# Patient Record
Sex: FEMALE | Race: WHITE | NOT HISPANIC OR LATINO | Employment: OTHER | URBAN - METROPOLITAN AREA
[De-identification: names, ages, dates, MRNs, and addresses within clinical notes are randomized per-mention and may not be internally consistent; named-entity substitution may affect disease eponyms.]

---

## 2017-01-24 RX ORDER — HYDROCHLOROTHIAZIDE 25 MG/1
TABLET ORAL
Qty: 30 TABLET | Refills: 0 | OUTPATIENT
Start: 2017-01-24

## 2017-08-29 ENCOUNTER — PATIENT OUTREACH (OUTPATIENT)
Dept: ADMINISTRATIVE | Facility: HOSPITAL | Age: 72
End: 2017-08-29

## 2017-09-19 ENCOUNTER — OFFICE VISIT (OUTPATIENT)
Dept: FAMILY MEDICINE | Facility: CLINIC | Age: 72
End: 2017-09-19
Payer: MEDICARE

## 2017-09-19 VITALS
BODY MASS INDEX: 23.61 KG/M2 | SYSTOLIC BLOOD PRESSURE: 201 MMHG | HEART RATE: 64 BPM | WEIGHT: 146.94 LBS | OXYGEN SATURATION: 97 % | DIASTOLIC BLOOD PRESSURE: 104 MMHG | HEIGHT: 66 IN | TEMPERATURE: 97 F

## 2017-09-19 DIAGNOSIS — Z12.39 BREAST CANCER SCREENING: ICD-10-CM

## 2017-09-19 DIAGNOSIS — E78.2 MIXED HYPERLIPIDEMIA: ICD-10-CM

## 2017-09-19 DIAGNOSIS — I16.0 ASYMPTOMATIC HYPERTENSIVE URGENCY: Primary | ICD-10-CM

## 2017-09-19 DIAGNOSIS — Z12.31 ENCOUNTER FOR SCREENING MAMMOGRAM FOR MALIGNANT NEOPLASM OF BREAST: ICD-10-CM

## 2017-09-19 DIAGNOSIS — Z78.9 STATIN INTOLERANCE: ICD-10-CM

## 2017-09-19 DIAGNOSIS — Z12.11 COLON CANCER SCREENING: ICD-10-CM

## 2017-09-19 DIAGNOSIS — M81.0 OSTEOPOROSIS, POST-MENOPAUSAL: ICD-10-CM

## 2017-09-19 DIAGNOSIS — M89.9 BONE DISORDER: ICD-10-CM

## 2017-09-19 PROCEDURE — 99213 OFFICE O/P EST LOW 20 MIN: CPT | Mod: PBBFAC,PO | Performed by: FAMILY MEDICINE

## 2017-09-19 PROCEDURE — 3080F DIAST BP >= 90 MM HG: CPT | Mod: ,,, | Performed by: FAMILY MEDICINE

## 2017-09-19 PROCEDURE — 1159F MED LIST DOCD IN RCRD: CPT | Mod: ,,, | Performed by: FAMILY MEDICINE

## 2017-09-19 PROCEDURE — 99214 OFFICE O/P EST MOD 30 MIN: CPT | Mod: S$PBB,,, | Performed by: FAMILY MEDICINE

## 2017-09-19 PROCEDURE — 99999 PR PBB SHADOW E&M-EST. PATIENT-LVL III: CPT | Mod: PBBFAC,,, | Performed by: FAMILY MEDICINE

## 2017-09-19 PROCEDURE — 1126F AMNT PAIN NOTED NONE PRSNT: CPT | Mod: ,,, | Performed by: FAMILY MEDICINE

## 2017-09-19 PROCEDURE — 3077F SYST BP >= 140 MM HG: CPT | Mod: ,,, | Performed by: FAMILY MEDICINE

## 2017-09-19 RX ORDER — GUAIFENESIN AND PHENYLEPHRINE HCL 400; 10 MG/1; MG/1
1 TABLET ORAL 2 TIMES DAILY
COMMUNITY

## 2017-09-19 RX ORDER — LOSARTAN POTASSIUM AND HYDROCHLOROTHIAZIDE 12.5; 5 MG/1; MG/1
1 TABLET ORAL DAILY
Qty: 90 TABLET | Refills: 3 | Status: SHIPPED | OUTPATIENT
Start: 2017-09-19 | End: 2018-09-12 | Stop reason: SDUPTHER

## 2017-09-19 RX ORDER — EPINEPHRINE 0.22MG
100 AEROSOL WITH ADAPTER (ML) INHALATION DAILY
COMMUNITY
End: 2018-10-18

## 2017-09-19 RX ORDER — FLAXSEED OIL 1000 MG
1 CAPSULE ORAL DAILY
COMMUNITY
End: 2019-04-22

## 2017-09-19 NOTE — PROGRESS NOTES
Chief Complaint:    Chief Complaint   Patient presents with    Annual Exam       History of Present Illness:  Condition is here after a long time it's been 2 years.  She is here for physical.  She used to take hydrochlorothiazide (hypertension also has hyperlipidemia is not on medication for osteoporosis.  She has occasional pain tip of the left she will bone only when she does something and found it does not radiate.  She does not think numbness or weakness also there is a 1 spot on the right lateral hip that feels sore sometimes but it does not limit her any and UA.  Her blood pressure is slightly high today he checked it 2 times within about 20 minutes span and it continued to stay high.  She denies any chest pain or shortness of breath.      ROS:  Review of Systems   Constitutional: Negative for activity change, chills, fatigue, fever and unexpected weight change.   HENT: Negative for congestion, ear discharge, ear pain, hearing loss, postnasal drip and rhinorrhea.    Eyes: Negative for pain and visual disturbance.   Respiratory: Negative for cough, chest tightness and shortness of breath.    Cardiovascular: Negative for chest pain and palpitations.   Gastrointestinal: Negative for abdominal pain, diarrhea and vomiting.   Endocrine: Negative for heat intolerance.   Genitourinary: Negative for dysuria, flank pain, frequency and hematuria.   Musculoskeletal: Negative for back pain, gait problem and neck pain.   Skin: Negative for color change and rash.   Neurological: Negative for dizziness, tremors, seizures, numbness and headaches.   Psychiatric/Behavioral: Negative for agitation, hallucinations, self-injury, sleep disturbance and suicidal ideas. The patient is not nervous/anxious.        Past Medical History:   Diagnosis Date    Arthritis     Hyperlipidemia     Hypertension        Social History:  Social History     Social History    Marital status: Single     Spouse name: N/A    Number of children: N/A  "   Years of education: N/A     Social History Main Topics    Smoking status: Never Smoker    Smokeless tobacco: Never Used    Alcohol use No    Drug use: No    Sexual activity: Yes     Partners: Male     Birth control/ protection: None     Other Topics Concern    Not on file     Social History Narrative    No narrative on file       Family History:   family history includes Emphysema in her mother; Heart disease in her father and mother; Lung cancer in her maternal grandfather.    Health Maintenance   Topic Date Due    Mammogram  05/12/2015    DEXA SCAN  04/21/2017    Influenza Vaccine  08/01/2017    Lipid Panel  04/10/2020    TETANUS VACCINE  04/04/2024    Colonoscopy  09/19/2024    Hepatitis C Screening  Completed    Zoster Vaccine  Completed    Pneumococcal (65+)  Completed       Physical Exam:    Vital Signs  Temp: 96.6 °F (35.9 °C)  Temp src: Tympanic  Pulse: 64  SpO2: 97 %  BP: (!) 201/104  BP Location: Left arm  Patient Position: Sitting  Pain Score: 0-No pain  Height and Weight  Height: 5' 5.5" (166.4 cm)  Weight: 66.7 kg (146 lb 15 oz)  BSA (Calculated - sq m): 1.75 sq meters  BMI (Calculated): 24.1  Weight in (lb) to have BMI = 25: 152.2]    Body mass index is 24.08 kg/m².    Physical Exam   Constitutional: She is oriented to person, place, and time. She appears well-developed.   HENT:   Mouth/Throat: Oropharynx is clear and moist.   Eyes: Conjunctivae are normal. Pupils are equal, round, and reactive to light.   Neck: Normal range of motion. Neck supple.   Cardiovascular: Normal rate, regular rhythm and normal heart sounds.    No murmur heard.  Pulmonary/Chest: Effort normal and breath sounds normal. No respiratory distress. She has no wheezes. She has no rales. She exhibits no tenderness.   Abdominal: Soft. She exhibits no distension and no mass. There is no tenderness. There is no guarding.   Musculoskeletal: She exhibits no edema or tenderness.        Legs:  Straight leg raising test " is negative normal range of motion of the back hip also has normal range of motion.   Lymphadenopathy:     She has no cervical adenopathy.   Neurological: She is alert and oriented to person, place, and time. She has normal reflexes.   Skin: Skin is warm and dry.   Psychiatric: She has a normal mood and affect. Her behavior is normal. Judgment and thought content normal.       Lab Results   Component Value Date    CHOL 252 (H) 04/10/2015    CHOL 245 (H) 04/01/2014    CHOL 210 (H) 09/27/2013    TRIG 165 (H) 04/10/2015    TRIG 83 04/01/2014    TRIG 119 09/27/2013    HDL 68 04/10/2015    HDL 72 04/01/2014    HDL 61 09/27/2013    TOTALCHOLEST 3.7 04/10/2015    TOTALCHOLEST 3.4 04/01/2014    TOTALCHOLEST 3.4 09/27/2013    NONHDLCHOL 184 04/10/2015    NONHDLCHOL 173 04/01/2014    NONHDLCHOL 149 09/27/2013       Lab Results   Component Value Date    HGBA1C 5.6 04/10/2015       Assessment:      ICD-10-CM ICD-9-CM   1. Asymptomatic hypertensive urgency I16.0 401.9   2. Encounter for screening mammogram for malignant neoplasm of breast Z12.31 V76.12   3. Bone disorder M89.9 733.90   4. Statin intolerance Z78.9 995.27   5. Mixed hyperlipidemia E78.2 272.2   6. Osteoporosis, post-menopausal M81.0 733.01   7. Breast cancer screening Z12.39 V76.10   8. Colon cancer screening Z12.11 V76.51         Plan:  1.  Asymptomatic hypertensive urgency I suspect the patient's blood pressure has been running high for a while I do not intend on lowering suddenly potato cardiovascular event patient is essentially asymptomatic would recommend lowering it gradually at least 20.7 a few days.  We'll start on losartan hydrochlorothiazide 50-12 0.5 once daily please start monitoring blood pressure purchase a home blood pressure monitor lites omeron 10 and start checking blood pressure 2 times a day.  She will be followed up closely in one week  We'll be scheduling a mammogram and DEXA scan  Colonoscopy recommend doing the trick test when we have the  kit patient refused a colonoscopy.  She appears to have some tendinitis at discharge tuberosity and some tendinitis of the right hip joint recommend physical therapy  Laboratory be checked at her next visit.  Orders Placed This Encounter   Procedures    Mammo Digital Screening Bilateral With CAD    DXA Bone Density Spine And Hip    Fecal Immunochemical Test (iFOBT)       Current Outpatient Prescriptions   Medication Sig Dispense Refill    coenzyme Q10 (CO Q-10) 100 mg capsule Take 100 mg by mouth once daily.      flaxseed oil 1,000 mg Cap Take 1 capsule by mouth once daily.      glucosamine HCl-msm-chondroitn 750-375-400 mg Tab Take 1 tablet by mouth 2 (two) times daily.      omega-3 fatty acids-vitamin E (FISH OIL) 1,000 mg Cap Take 1 capsule by mouth once daily.      turmeric root extract 500 mg Cap Take 1 capsule by mouth 2 (two) times daily.      losartan-hydrochlorothiazide 50-12.5 mg (HYZAAR) 50-12.5 mg per tablet Take 1 tablet by mouth once daily. 90 tablet 3     No current facility-administered medications for this visit.        Medications Discontinued During This Encounter   Medication Reason    hydrochlorothiazide (HYDRODIURIL) 25 MG tablet Patient no longer taking    meloxicam (MOBIC) 7.5 MG tablet Patient no longer taking    [place]'s wort 150 mg Cap Patient no longer taking       Return in about 1 week (around 9/26/2017).      Dr Porter Vasques MD    Disclaimer: This note is prepared using voice recognition system and as such is likely to have errors and is not proof read.

## 2017-09-27 ENCOUNTER — LAB VISIT (OUTPATIENT)
Dept: LAB | Facility: HOSPITAL | Age: 72
End: 2017-09-27
Payer: MEDICARE

## 2017-09-27 ENCOUNTER — OFFICE VISIT (OUTPATIENT)
Dept: FAMILY MEDICINE | Facility: CLINIC | Age: 72
End: 2017-09-27
Payer: MEDICARE

## 2017-09-27 VITALS
HEIGHT: 66 IN | OXYGEN SATURATION: 96 % | BODY MASS INDEX: 23.26 KG/M2 | DIASTOLIC BLOOD PRESSURE: 70 MMHG | SYSTOLIC BLOOD PRESSURE: 130 MMHG | WEIGHT: 144.75 LBS | TEMPERATURE: 97 F | HEART RATE: 80 BPM

## 2017-09-27 DIAGNOSIS — I10 ESSENTIAL HYPERTENSION: Primary | ICD-10-CM

## 2017-09-27 DIAGNOSIS — R53.83 FATIGUE, UNSPECIFIED TYPE: ICD-10-CM

## 2017-09-27 DIAGNOSIS — R11.0 NAUSEA: ICD-10-CM

## 2017-09-27 DIAGNOSIS — I10 ESSENTIAL HYPERTENSION: ICD-10-CM

## 2017-09-27 LAB
ALBUMIN SERPL BCP-MCNC: 3.6 G/DL
ALP SERPL-CCNC: 127 U/L
ALT SERPL W/O P-5'-P-CCNC: 14 U/L
ANION GAP SERPL CALC-SCNC: 12 MMOL/L
AST SERPL-CCNC: 18 U/L
BASOPHILS # BLD AUTO: 0.03 K/UL
BASOPHILS NFR BLD: 0.5 %
BILIRUB SERPL-MCNC: 1.1 MG/DL
BUN SERPL-MCNC: 20 MG/DL
CALCIUM SERPL-MCNC: 10.5 MG/DL
CHLORIDE SERPL-SCNC: 102 MMOL/L
CHOLEST SERPL-MCNC: 231 MG/DL
CHOLEST/HDLC SERPL: 3.5 {RATIO}
CO2 SERPL-SCNC: 25 MMOL/L
CREAT SERPL-MCNC: 0.7 MG/DL
DIFFERENTIAL METHOD: ABNORMAL
EOSINOPHIL # BLD AUTO: 0.3 K/UL
EOSINOPHIL NFR BLD: 5 %
ERYTHROCYTE [DISTWIDTH] IN BLOOD BY AUTOMATED COUNT: 19.1 %
EST. GFR  (AFRICAN AMERICAN): >60 ML/MIN/1.73 M^2
EST. GFR  (NON AFRICAN AMERICAN): >60 ML/MIN/1.73 M^2
GLUCOSE SERPL-MCNC: 103 MG/DL
HCT VFR BLD AUTO: 36.2 %
HDLC SERPL-MCNC: 66 MG/DL
HDLC SERPL: 28.6 %
HGB BLD-MCNC: 11.5 G/DL
LDLC SERPL CALC-MCNC: 134.6 MG/DL
LYMPHOCYTES # BLD AUTO: 1.6 K/UL
LYMPHOCYTES NFR BLD: 23.8 %
MCH RBC QN AUTO: 24 PG
MCHC RBC AUTO-ENTMCNC: 31.8 G/DL
MCV RBC AUTO: 76 FL
MONOCYTES # BLD AUTO: 0.7 K/UL
MONOCYTES NFR BLD: 10.7 %
NEUTROPHILS # BLD AUTO: 3.9 K/UL
NEUTROPHILS NFR BLD: 60 %
NONHDLC SERPL-MCNC: 165 MG/DL
PLATELET # BLD AUTO: 305 K/UL
PMV BLD AUTO: 10.6 FL
POTASSIUM SERPL-SCNC: 3.3 MMOL/L
PROT SERPL-MCNC: 7.5 G/DL
RBC # BLD AUTO: 4.79 M/UL
SODIUM SERPL-SCNC: 139 MMOL/L
TRIGL SERPL-MCNC: 152 MG/DL
TSH SERPL DL<=0.005 MIU/L-ACNC: 0.59 UIU/ML
VIT B12 SERPL-MCNC: 863 PG/ML
WBC # BLD AUTO: 6.56 K/UL

## 2017-09-27 PROCEDURE — 1126F AMNT PAIN NOTED NONE PRSNT: CPT | Mod: ,,, | Performed by: FAMILY MEDICINE

## 2017-09-27 PROCEDURE — 83036 HEMOGLOBIN GLYCOSYLATED A1C: CPT

## 2017-09-27 PROCEDURE — 82607 VITAMIN B-12: CPT

## 2017-09-27 PROCEDURE — 84443 ASSAY THYROID STIM HORMONE: CPT

## 2017-09-27 PROCEDURE — 3075F SYST BP GE 130 - 139MM HG: CPT | Mod: ,,, | Performed by: FAMILY MEDICINE

## 2017-09-27 PROCEDURE — 99214 OFFICE O/P EST MOD 30 MIN: CPT | Mod: S$PBB,,, | Performed by: FAMILY MEDICINE

## 2017-09-27 PROCEDURE — 1159F MED LIST DOCD IN RCRD: CPT | Mod: ,,, | Performed by: FAMILY MEDICINE

## 2017-09-27 PROCEDURE — 99213 OFFICE O/P EST LOW 20 MIN: CPT | Mod: PBBFAC,PO | Performed by: FAMILY MEDICINE

## 2017-09-27 PROCEDURE — 3078F DIAST BP <80 MM HG: CPT | Mod: ,,, | Performed by: FAMILY MEDICINE

## 2017-09-27 PROCEDURE — 80053 COMPREHEN METABOLIC PANEL: CPT

## 2017-09-27 PROCEDURE — 36415 COLL VENOUS BLD VENIPUNCTURE: CPT | Mod: PO

## 2017-09-27 PROCEDURE — 80061 LIPID PANEL: CPT

## 2017-09-27 PROCEDURE — 99999 PR PBB SHADOW E&M-EST. PATIENT-LVL III: CPT | Mod: PBBFAC,,, | Performed by: FAMILY MEDICINE

## 2017-09-27 PROCEDURE — 85025 COMPLETE CBC W/AUTO DIFF WBC: CPT

## 2017-09-27 NOTE — PROGRESS NOTES
Chief Complaint:    Chief Complaint   Patient presents with    Follow-up       History of Present Illness:    She presents today for follow-up she says her blood pressures running good at home she forgot to bring her machine.  She has had some nausea anytime she takes the medicine and been feeling some muscle cramps.  No other side effects.  She has been feeling fatigued also.  Denies any depression.  ROS:  Review of Systems   Constitutional: Negative for appetite change, chills and fever.   HENT: Negative for congestion, ear discharge, ear pain, facial swelling, mouth sores, postnasal drip, rhinorrhea, sinus pressure, sneezing, sore throat, trouble swallowing and voice change.    Eyes: Negative for discharge, redness and itching.   Respiratory: Negative for cough, chest tightness, shortness of breath and wheezing.    Cardiovascular: Negative for chest pain.       Past Medical History:   Diagnosis Date    Arthritis     Hyperlipidemia     Hypertension        Social History:  Social History     Social History    Marital status: Single     Spouse name: N/A    Number of children: N/A    Years of education: N/A     Social History Main Topics    Smoking status: Never Smoker    Smokeless tobacco: Never Used    Alcohol use No    Drug use: No    Sexual activity: Yes     Partners: Male     Birth control/ protection: None     Other Topics Concern    None     Social History Narrative    None       Family History:   family history includes Emphysema in her mother; Heart disease in her father and mother; Lung cancer in her maternal grandfather.    Health Maintenance   Topic Date Due    Fecal Occult Blood Test (FOBT)/FitKit  07/19/1997    Mammogram  05/12/2015    DEXA SCAN  04/21/2017    Influenza Vaccine  08/01/2017    Lipid Panel  04/10/2020    TETANUS VACCINE  04/04/2024    Colonoscopy  09/19/2024    Hepatitis C Screening  Completed    Zoster Vaccine  Completed    Pneumococcal (65+)  Completed  "      Physical Exam:    Vital Signs  Temp: 97.3 °F (36.3 °C)  Temp src: Tympanic  Pulse: 80  SpO2: 96 %  BP: 130/70  BP Location: Left arm  Patient Position: Sitting  Pain Score: 0-No pain (home readings)  Height and Weight  Height: 5' 5.5" (166.4 cm)  Weight: 65.6 kg (144 lb 11.7 oz)  BSA (Calculated - sq m): 1.74 sq meters  BMI (Calculated): 23.8  Weight in (lb) to have BMI = 25: 152.2]    Body mass index is 23.72 kg/m².    Physical Exam   Constitutional: She is oriented to person, place, and time.   HENT:   Head: Normocephalic.   Eyes: Conjunctivae are normal. Pupils are equal, round, and reactive to light.   Cardiovascular:   Murmur heard.   Systolic murmur is present with a grade of 2/6   Pulmonary/Chest: Effort normal and breath sounds normal.   Neurological: She is alert and oriented to person, place, and time.       Lab Results   Component Value Date    CHOL 252 (H) 04/10/2015    CHOL 245 (H) 04/01/2014    CHOL 210 (H) 09/27/2013    TRIG 165 (H) 04/10/2015    TRIG 83 04/01/2014    TRIG 119 09/27/2013    HDL 68 04/10/2015    HDL 72 04/01/2014    HDL 61 09/27/2013    TOTALCHOLEST 3.7 04/10/2015    TOTALCHOLEST 3.4 04/01/2014    TOTALCHOLEST 3.4 09/27/2013    NONHDLCHOL 184 04/10/2015    NONHDLCHOL 173 04/01/2014    NONHDLCHOL 149 09/27/2013       Lab Results   Component Value Date    HGBA1C 5.6 04/10/2015       Assessment:      ICD-10-CM ICD-9-CM   1. Essential hypertension I10 401.9   2. Nausea R11.0 787.02   3. Fatigue, unspecified type R53.83 780.79         Plan:  Advised patient to take the medicine at night to see if the nausea improves if not we will change the medicine but it's a good medicine and I do not want to be in a rash should the side effects past.  Check labs as below she follow-up in a few weeks with the blood pressure machine.  Orders Placed This Encounter   Procedures    CBC auto differential    Comprehensive metabolic panel    Hemoglobin A1c    Lipid panel    TSH    Vitamin B12 "       Current Outpatient Prescriptions   Medication Sig Dispense Refill    coenzyme Q10 (CO Q-10) 100 mg capsule Take 100 mg by mouth once daily.      flaxseed oil 1,000 mg Cap Take 1 capsule by mouth once daily.      glucosamine HCl-msm-chondroitn 750-375-400 mg Tab Take 1 tablet by mouth 2 (two) times daily.      losartan-hydrochlorothiazide 50-12.5 mg (HYZAAR) 50-12.5 mg per tablet Take 1 tablet by mouth once daily. 90 tablet 3    omega-3 fatty acids-vitamin E (FISH OIL) 1,000 mg Cap Take 1 capsule by mouth once daily.      turmeric root extract 500 mg Cap Take 1 capsule by mouth 2 (two) times daily.       No current facility-administered medications for this visit.        There are no discontinued medications.    Return in about 3 weeks (around 10/18/2017).      Dr Porter Vasques MD    Disclaimer: This note is prepared using voice recognition system and as such is likely to have errors and is not proof read.

## 2017-09-28 LAB
ESTIMATED AVG GLUCOSE: 108 MG/DL
HBA1C MFR BLD HPLC: 5.4 %

## 2017-09-28 RX ORDER — POTASSIUM CHLORIDE 20 MEQ/1
20 TABLET, EXTENDED RELEASE ORAL DAILY
Qty: 30 TABLET | Refills: 4 | Status: SHIPPED | OUTPATIENT
Start: 2017-09-28 | End: 2018-03-29 | Stop reason: SDUPTHER

## 2017-10-05 ENCOUNTER — TELEPHONE (OUTPATIENT)
Dept: RADIOLOGY | Facility: HOSPITAL | Age: 72
End: 2017-10-05

## 2017-10-06 ENCOUNTER — HOSPITAL ENCOUNTER (OUTPATIENT)
Dept: RADIOLOGY | Facility: HOSPITAL | Age: 72
Discharge: HOME | End: 2017-10-06
Attending: FAMILY MEDICINE
Payer: MEDICARE

## 2017-10-06 DIAGNOSIS — Z12.31 ENCOUNTER FOR SCREENING MAMMOGRAM FOR MALIGNANT NEOPLASM OF BREAST: ICD-10-CM

## 2017-10-06 PROCEDURE — 77067 SCR MAMMO BI INCL CAD: CPT | Mod: 26,,, | Performed by: RADIOLOGY

## 2017-10-06 PROCEDURE — 77067 SCR MAMMO BI INCL CAD: CPT | Mod: TC

## 2017-10-18 ENCOUNTER — OFFICE VISIT (OUTPATIENT)
Dept: FAMILY MEDICINE | Facility: CLINIC | Age: 72
End: 2017-10-18
Payer: MEDICARE

## 2017-10-18 ENCOUNTER — LAB VISIT (OUTPATIENT)
Dept: LAB | Facility: HOSPITAL | Age: 72
End: 2017-10-18
Attending: FAMILY MEDICINE
Payer: MEDICARE

## 2017-10-18 VITALS
HEART RATE: 66 BPM | TEMPERATURE: 96 F | DIASTOLIC BLOOD PRESSURE: 70 MMHG | HEIGHT: 66 IN | OXYGEN SATURATION: 98 % | WEIGHT: 149.25 LBS | BODY MASS INDEX: 23.99 KG/M2 | SYSTOLIC BLOOD PRESSURE: 115 MMHG

## 2017-10-18 DIAGNOSIS — E87.6 HYPOKALEMIA: ICD-10-CM

## 2017-10-18 DIAGNOSIS — D50.9 MICROCYTIC HYPOCHROMIC ANEMIA: ICD-10-CM

## 2017-10-18 DIAGNOSIS — I10 HYPERTENSION, UNSPECIFIED TYPE: Primary | ICD-10-CM

## 2017-10-18 LAB
ANION GAP SERPL CALC-SCNC: 9 MMOL/L
BUN SERPL-MCNC: 14 MG/DL
CALCIUM SERPL-MCNC: 10.3 MG/DL
CHLORIDE SERPL-SCNC: 104 MMOL/L
CO2 SERPL-SCNC: 27 MMOL/L
CREAT SERPL-MCNC: 0.6 MG/DL
EST. GFR  (AFRICAN AMERICAN): >60 ML/MIN/1.73 M^2
EST. GFR  (NON AFRICAN AMERICAN): >60 ML/MIN/1.73 M^2
FERRITIN SERPL-MCNC: 16 NG/ML
GLUCOSE SERPL-MCNC: 79 MG/DL
IRON SERPL-MCNC: 51 UG/DL
POTASSIUM SERPL-SCNC: 4 MMOL/L
RETICS/RBC NFR AUTO: 1.6 %
SATURATED IRON: 12 %
SODIUM SERPL-SCNC: 140 MMOL/L
TOTAL IRON BINDING CAPACITY: 443 UG/DL
TRANSFERRIN SERPL-MCNC: 299 MG/DL
TRANSFERRIN SERPL-MCNC: 299 MG/DL

## 2017-10-18 PROCEDURE — 83615 LACTATE (LD) (LDH) ENZYME: CPT

## 2017-10-18 PROCEDURE — 85045 AUTOMATED RETICULOCYTE COUNT: CPT

## 2017-10-18 PROCEDURE — 36415 COLL VENOUS BLD VENIPUNCTURE: CPT | Mod: PO

## 2017-10-18 PROCEDURE — 99999 PR PBB SHADOW E&M-EST. PATIENT-LVL III: CPT | Mod: PBBFAC,,, | Performed by: FAMILY MEDICINE

## 2017-10-18 PROCEDURE — 99213 OFFICE O/P EST LOW 20 MIN: CPT | Mod: PBBFAC,PO | Performed by: FAMILY MEDICINE

## 2017-10-18 PROCEDURE — 80048 BASIC METABOLIC PNL TOTAL CA: CPT

## 2017-10-18 PROCEDURE — 82728 ASSAY OF FERRITIN: CPT

## 2017-10-18 PROCEDURE — 99214 OFFICE O/P EST MOD 30 MIN: CPT | Mod: S$PBB,,, | Performed by: FAMILY MEDICINE

## 2017-10-18 PROCEDURE — 83540 ASSAY OF IRON: CPT

## 2017-10-18 NOTE — PROGRESS NOTES
Chief Complaint:    Chief Complaint   Patient presents with    Follow-up       History of Present Illness:    Patient is here for follow-up,  Her blood pressure at home is running good weight and be checked her machine with our machine and so running pretty accurate.  Patient's nausea and fatigue of the results.  Her last labs revealed that she has microcytic hypochromic anemia.  She was also slightly hypokalemic possibly secondary to the medication she has been taking the potassium pills.    ROS:  Review of Systems   Constitutional: Negative for activity change, chills, fatigue, fever and unexpected weight change.   HENT: Negative for congestion, ear discharge, ear pain, hearing loss, postnasal drip and rhinorrhea.    Eyes: Negative for pain and visual disturbance.   Respiratory: Negative for cough, chest tightness and shortness of breath.    Cardiovascular: Negative for chest pain and palpitations.   Gastrointestinal: Negative for abdominal pain, diarrhea and vomiting.   Endocrine: Negative for heat intolerance.   Genitourinary: Negative for dysuria, flank pain, frequency and hematuria.   Musculoskeletal: Negative for back pain, gait problem and neck pain.   Skin: Negative for color change and rash.   Neurological: Negative for dizziness, tremors, seizures, numbness and headaches.   Psychiatric/Behavioral: Negative for agitation, hallucinations, self-injury, sleep disturbance and suicidal ideas. The patient is not nervous/anxious.        Past Medical History:   Diagnosis Date    Arthritis     Hyperlipidemia     Hypertension        Social History:  Social History     Social History    Marital status: Single     Spouse name: N/A    Number of children: N/A    Years of education: N/A     Social History Main Topics    Smoking status: Never Smoker    Smokeless tobacco: Never Used    Alcohol use No    Drug use: No    Sexual activity: No     Other Topics Concern    None     Social History Narrative    None  "      Family History:   family history includes Emphysema in her mother; Heart disease in her father and mother; Lung cancer in her maternal grandfather.    Health Maintenance   Topic Date Due    Mammogram  10/06/2018    DEXA SCAN  10/06/2019    Lipid Panel  09/27/2022    TETANUS VACCINE  04/04/2024    Colonoscopy  09/19/2024    Hepatitis C Screening  Completed    Zoster Vaccine  Completed    Pneumococcal (65+)  Completed    Influenza Vaccine  Addressed       Physical Exam:    Vital Signs  Temp: 96.4 °F (35.8 °C)  Temp src: Tympanic  Pulse: 66  SpO2: 98 %  BP: 115/70  BP Location: Left arm  Patient Position: Sitting  Pain Score: 0-No pain  Height and Weight  Height: 5' 5.5" (166.4 cm)  Weight: 67.7 kg (149 lb 4 oz)  BSA (Calculated - sq m): 1.77 sq meters  BMI (Calculated): 24.5  Weight in (lb) to have BMI = 25: 152.2]    Body mass index is 24.46 kg/m².    Physical Exam   Constitutional: She is oriented to person, place, and time. She appears well-developed.   HENT:   Mouth/Throat: Oropharynx is clear and moist.   Eyes: Conjunctivae are normal. Pupils are equal, round, and reactive to light.   Neck: Normal range of motion. Neck supple.   Cardiovascular: Normal rate, regular rhythm and normal heart sounds.    No murmur heard.  Pulmonary/Chest: Effort normal and breath sounds normal. No respiratory distress. She has no wheezes. She has no rales. She exhibits no tenderness.   Abdominal: Soft. She exhibits no distension and no mass. There is no tenderness. There is no guarding.   Musculoskeletal: She exhibits no edema or tenderness.   Lymphadenopathy:     She has no cervical adenopathy.   Neurological: She is alert and oriented to person, place, and time. She has normal reflexes.   Skin: Skin is warm and dry.   Psychiatric: She has a normal mood and affect. Her behavior is normal. Judgment and thought content normal.       Lab Results   Component Value Date    CHOL 231 (H) 09/27/2017    CHOL 252 (H) 04/10/2015 "    CHOL 245 (H) 04/01/2014    TRIG 152 (H) 09/27/2017    TRIG 165 (H) 04/10/2015    TRIG 83 04/01/2014    HDL 66 09/27/2017    HDL 68 04/10/2015    HDL 72 04/01/2014    TOTALCHOLEST 3.5 09/27/2017    TOTALCHOLEST 3.7 04/10/2015    TOTALCHOLEST 3.4 04/01/2014    NONHDLCHOL 165 09/27/2017    NONHDLCHOL 184 04/10/2015    NONHDLCHOL 173 04/01/2014       Lab Results   Component Value Date    HGBA1C 5.4 09/27/2017       Assessment:      ICD-10-CM ICD-9-CM   1. Hypertension, unspecified type I10 401.9   2. Microcytic hypochromic anemia D50.9 280.9   3. Hypokalemia E87.6 276.8         Plan:  1.  Hypertension good control continue home medications continue to monitor  2.  Anemia, we will initiated workup explained to her that if she does have this she will need referral to hematology and gastroenterology.  3.  Hypokalemia and check a BMP continue potassium pills.  Follow-up 6 months  Orders Placed This Encounter   Procedures    Iron    Occult blood x 1, stool    Occult blood x 1, stool    Occult blood x 1, stool    Iron and TIBC    Lactate dehydrogenase    Reticulocytes    Ferritin    Transferrin    Basic metabolic panel       Current Outpatient Prescriptions   Medication Sig Dispense Refill    coenzyme Q10 (CO Q-10) 100 mg capsule Take 100 mg by mouth once daily.      flaxseed oil 1,000 mg Cap Take 1 capsule by mouth once daily.      glucosamine HCl-msm-chondroitn 750-375-400 mg Tab Take 1 tablet by mouth 2 (two) times daily.      losartan-hydrochlorothiazide 50-12.5 mg (HYZAAR) 50-12.5 mg per tablet Take 1 tablet by mouth once daily. 90 tablet 3    omega-3 fatty acids-vitamin E (FISH OIL) 1,000 mg Cap Take 1 capsule by mouth once daily.      potassium chloride SA (K-DUR,KLOR-CON) 20 MEQ tablet Take 1 tablet (20 mEq total) by mouth once daily. 30 tablet 4    turmeric root extract 500 mg Cap Take 1 capsule by mouth 2 (two) times daily.       No current facility-administered medications for this visit.         There are no discontinued medications.    Return in about 6 months (around 4/18/2018).      Porter Vasques MD          Disclaimer: This note is prepared using voice recognition system and as such is likely to have errors and is not proof read.

## 2017-10-19 ENCOUNTER — LAB VISIT (OUTPATIENT)
Dept: LAB | Facility: HOSPITAL | Age: 72
End: 2017-10-19
Attending: FAMILY MEDICINE
Payer: MEDICARE

## 2017-10-19 DIAGNOSIS — Z12.11 COLON CANCER SCREENING: ICD-10-CM

## 2017-10-19 LAB
HEMOCCULT STL QL IA: NEGATIVE
LDH SERPL L TO P-CCNC: 170 U/L

## 2017-10-19 PROCEDURE — 82274 ASSAY TEST FOR BLOOD FECAL: CPT

## 2017-10-20 ENCOUNTER — TELEPHONE (OUTPATIENT)
Dept: INTERNAL MEDICINE | Facility: CLINIC | Age: 72
End: 2017-10-20

## 2017-10-20 ENCOUNTER — TELEPHONE (OUTPATIENT)
Dept: RADIOLOGY | Facility: HOSPITAL | Age: 72
End: 2017-10-20

## 2017-10-20 NOTE — TELEPHONE ENCOUNTER
----- Message from Porter Vasques MD sent at 10/19/2017  6:16 PM CDT -----  You have iron deficiency anemia, he will need a full GI workup.  Please see hematology and they can address this.

## 2017-10-23 ENCOUNTER — HOSPITAL ENCOUNTER (OUTPATIENT)
Dept: RADIOLOGY | Facility: HOSPITAL | Age: 72
Discharge: HOME | End: 2017-10-23
Attending: FAMILY MEDICINE
Payer: MEDICARE

## 2017-10-23 DIAGNOSIS — R92.8 ABNORMAL MAMMOGRAM: ICD-10-CM

## 2017-10-23 PROCEDURE — 77061 BREAST TOMOSYNTHESIS UNI: CPT | Mod: TC

## 2017-10-23 PROCEDURE — 77061 BREAST TOMOSYNTHESIS UNI: CPT | Mod: 26,,, | Performed by: RADIOLOGY

## 2017-10-23 PROCEDURE — 77065 DX MAMMO INCL CAD UNI: CPT | Mod: 26,,, | Performed by: RADIOLOGY

## 2017-10-24 DIAGNOSIS — R92.8 ABNORMAL MAMMOGRAM: Primary | ICD-10-CM

## 2017-10-25 RX ORDER — FERROUS SULFATE 325(65) MG
325 TABLET ORAL
Qty: 60 TABLET | Refills: 0 | Status: SHIPPED | OUTPATIENT
Start: 2017-10-25

## 2017-11-02 ENCOUNTER — TELEPHONE (OUTPATIENT)
Dept: FAMILY MEDICINE | Facility: CLINIC | Age: 72
End: 2017-11-02

## 2017-11-02 NOTE — TELEPHONE ENCOUNTER
----- Message from Porter Vasques MD sent at 10/30/2017  6:00 PM CDT -----  Patient needs to have the spot compression views and ultrasound please ask her to reschedule

## 2017-11-02 NOTE — TELEPHONE ENCOUNTER
Tried to reach pt about her results and needing to reschedule her mammo and ultrasound no answer and no way to leave message

## 2017-11-09 ENCOUNTER — TELEPHONE (OUTPATIENT)
Dept: FAMILY MEDICINE | Facility: CLINIC | Age: 72
End: 2017-11-09

## 2017-11-09 DIAGNOSIS — E61.1 LOW IRON: Primary | ICD-10-CM

## 2017-11-09 NOTE — TELEPHONE ENCOUNTER
----- Message from Susie Garcia sent at 11/9/2017  4:28 PM CST -----  Contact: PT   Pt request call back about due to her receiving a letter for a test that she has already had.   [phone] (home)

## 2017-12-12 ENCOUNTER — TELEPHONE (OUTPATIENT)
Dept: ORTHOPEDICS | Facility: CLINIC | Age: 72
End: 2017-12-12

## 2017-12-12 DIAGNOSIS — M25.572 LEFT ANKLE PAIN, UNSPECIFIED CHRONICITY: Primary | ICD-10-CM

## 2017-12-12 NOTE — TELEPHONE ENCOUNTER
Pt contacted to offer earlier appointment with [facility]. Pt accepted 12/13/2017 at 0930. Pt was instructed to arrive 30min prior to appointment to have xray conducted. Pt verbalized understanding.

## 2017-12-13 ENCOUNTER — OFFICE VISIT (OUTPATIENT)
Dept: ORTHOPEDICS | Facility: CLINIC | Age: 72
End: 2017-12-13
Payer: MEDICARE

## 2017-12-13 ENCOUNTER — HOSPITAL ENCOUNTER (OUTPATIENT)
Dept: RADIOLOGY | Facility: HOSPITAL | Age: 72
Discharge: HOME | End: 2017-12-13
Attending: ORTHOPAEDIC SURGERY
Payer: MEDICARE

## 2017-12-13 VITALS
HEART RATE: 69 BPM | RESPIRATION RATE: 19 BRPM | BODY MASS INDEX: 24.22 KG/M2 | HEIGHT: 66 IN | SYSTOLIC BLOOD PRESSURE: 140 MMHG | DIASTOLIC BLOOD PRESSURE: 70 MMHG | WEIGHT: 150.69 LBS

## 2017-12-13 DIAGNOSIS — M19.072 ARTHRITIS OF ANKLE, LEFT: Primary | ICD-10-CM

## 2017-12-13 DIAGNOSIS — M25.572 LEFT ANKLE PAIN, UNSPECIFIED CHRONICITY: ICD-10-CM

## 2017-12-13 DIAGNOSIS — M21.072 ACQUIRED VALGUS DEFORMITY OF LEFT ANKLE: ICD-10-CM

## 2017-12-13 PROCEDURE — 73610 X-RAY EXAM OF ANKLE: CPT | Mod: 26,LT,, | Performed by: RADIOLOGY

## 2017-12-13 PROCEDURE — 99999 PR PBB SHADOW E&M-EST. PATIENT-LVL III: CPT | Mod: PBBFAC,,, | Performed by: ORTHOPAEDIC SURGERY

## 2017-12-13 PROCEDURE — 99213 OFFICE O/P EST LOW 20 MIN: CPT | Mod: PBBFAC,25 | Performed by: ORTHOPAEDIC SURGERY

## 2017-12-13 PROCEDURE — 99203 OFFICE O/P NEW LOW 30 MIN: CPT | Mod: S$PBB,,, | Performed by: ORTHOPAEDIC SURGERY

## 2017-12-13 PROCEDURE — 73610 X-RAY EXAM OF ANKLE: CPT | Mod: TC,LT

## 2017-12-13 RX ORDER — MELOXICAM 15 MG/1
15 TABLET ORAL DAILY
Qty: 30 TABLET | Refills: 1 | Status: SHIPPED | OUTPATIENT
Start: 2017-12-13 | End: 2018-04-18

## 2017-12-13 NOTE — PATIENT INSTRUCTIONS
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 mobic OR alleve on regular basis  2. [place] brace  3. Possible referral to Foot and ankle surgeon for consideration of ankle fusion vs. replacement

## 2017-12-13 NOTE — PROGRESS NOTES
"Subjective:     Patient ID: Joyce Marino is a 72 y.o. female.    Chief Complaint: Weakness and Pain of the Left Ankle    Ankle Pain    The pain is present in the left ankle. The pain radiates to the left knee. This is a chronic problem. The current episode started more than 1 year ago. The injury was the result of a collision/contact action while at school. The problem occurs rarely. The problem has been gradually worsening. The quality of the pain is described as sharp and dull. The pain is at a severity of 0/10. Associated symptoms include a fever. Pertinent negatives include no itching. The symptoms are aggravated by bearing weight (spontaneous). She has tried NSAIDs for the symptoms. The treatment provided moderate relief. Physical therapy was not tried.    Ms Marino is here for evaluation of left ankle pain and "weakness." She recalls an injury many years ago, in high school, playing basketball, got "rolled up" on. Was diagnosed with an ankle sprain at the time. She used to have recurrent ankle instability but this has not been a problem over the recent years. More difficulty with push off and feeling "weak." She has pain when working outside, especially with her horses. She notices that her gait is off, and she limps regularly.    Past Medical History:   Diagnosis Date    Arthritis     Hyperlipidemia     Hypertension      Past Surgical History:   Procedure Laterality Date    HYSTERECTOMY  1992    for fibroids    OOPHORECTOMY      BSO at time of hysterectomy    michael in right leg       Family History   Problem Relation Age of Onset    Heart disease Mother     Emphysema Mother     Heart disease Father     Lung cancer Maternal Grandfather      Social History     Social History    Marital status: Single     Spouse name: N/A    Number of children: N/A    Years of education: N/A     Occupational History    Not on file.     Social History Main Topics    Smoking status: Never Smoker    Smokeless " tobacco: Never Used    Alcohol use No    Drug use: No    Sexual activity: No     Other Topics Concern    Not on file     Social History Narrative    No narrative on file     Medication List with Changes/Refills   New Medications    MELOXICAM (MOBIC) 15 MG TABLET    Take 1 tablet (15 mg total) by mouth once daily.   Current Medications    COENZYME Q10 (CO Q-10) 100 MG CAPSULE    Take 100 mg by mouth once daily.    FERROUS SULFATE 325 MG (65 MG IRON) TAB TABLET    Take 1 tablet (325 mg total) by mouth daily with breakfast.    FLAXSEED OIL 1,000 MG CAP    Take 1 capsule by mouth once daily.    GLUCOSAMINE HCL-MSM-CHONDROITN 750-375-400 MG TAB    Take 1 tablet by mouth 2 (two) times daily.    LOSARTAN-HYDROCHLOROTHIAZIDE 50-12.5 MG (HYZAAR) 50-12.5 MG PER TABLET    Take 1 tablet by mouth once daily.    OMEGA-3 FATTY ACIDS-VITAMIN E (FISH OIL) 1,000 MG CAP    Take 1 capsule by mouth once daily.    POTASSIUM CHLORIDE SA (K-DUR,KLOR-CON) 20 MEQ TABLET    Take 1 tablet (20 mEq total) by mouth once daily.    TURMERIC ROOT EXTRACT 500 MG CAP    Take 1 capsule by mouth 2 (two) times daily.     Review of patient's allergies indicates:  No Known Allergies  Review of Systems   Constitution: Positive for fever.   HENT: Positive for sore throat.    Eyes: Negative for blurred vision.   Cardiovascular: Negative for dyspnea on exertion.   Respiratory: Negative for shortness of breath.    Hematologic/Lymphatic: Does not bruise/bleed easily.   Skin: Negative for itching.   Gastrointestinal: Negative for vomiting.   Genitourinary: Negative for dysuria.   Neurological: Negative for dizziness.   Psychiatric/Behavioral: The patient does not have insomnia.        Objective:   Body mass index is 24.22 kg/m².  Vitals:    12/13/17 0924   BP: (!) 140/70   Pulse: 69   Resp: 19       General: Joyce is well-developed, well-nourished, appears stated age, in no acute distress, alert and oriented to time, place and person.           General  Musculoskeletal Exam   Gait: antalgic     Right Ankle/Foot Exam   Right ankle exam is normal.    Left Ankle/Foot Exam     Inspection  Deformity: present    Pain   The patient exhibits pain of the anterior talofibular ligament and lateral malleolus.    Range of Motion   Ankle Joint  Dorsiflexion: 0   Plantar flexion: 15     Alignment   Knee Alignment: varus  Hindfoot Alignment: valgus    Tests   Anterior drawer: negative  Varus tilt: 1+  Single Heel Rise: unable to perform    Other   Sensation: normal    Comments:  She has valgus deformity to the ankle. She has pain over the anteriomedial and subfibular aspect with direct palpation. Varus and valgus stress is otherwise negative, but she does have noted valgus deformity as stated above.      Vascular Exam       Left Pulses  Dorsalis Pedis:      2+              IMAGING AP Mortise and Lateral weight bearing of the left ankle obtained today and interpreted by me show severe tibiotalar joint osteoarthritis, decreased joint space with more severe narrowing of the lateral superior clear space resulting in valgus talar tilt, approximately 15-20 degrees of tilt. Osteophytes are present. There is subtalar joint decreased joint space and osteophyte formation especially posteriorly.     Assessment:     Encounter Diagnoses   Name Primary?    Arthritis of ankle, left Yes    Acquired valgus deformity of left ankle         Plan:     I had a long discussion with Ms. Marino regarding her left ankle arthritis and valgus deformity. I do think that for this chronic problem that it is reasonable to try some non operative measures to see if she gets some relief, but with her severe arthritis, co involvement of the subtalar joint, and valgus defomity and tilt, I do think that she will ultimately need surgical intervention for this problem. Interestingly though, she does not seem to be as symptomatic as her radiographs suggest. She is relatively high functioning despite this very bad  "problem, and we did discuss this point today.    From a non operative treatment standpoint, we will do a regularly scheduled NSAID, and I suggested an [place] Brace / single rocker shoe. She will start the medication and see how she does. She will consider the brace and let us know if she would like to proceed with that. I do not think that an injection will help much at this time given the severe "bone on bone" arthrosis.    Ultimately though, I discussed with her that surgery may likely be the best way to address her pain and deformity if she fails non operative treatment. I discussed with her that we have excellent foota and ankle orthopaedic surgeons in the [facility] system and I would be very happy to set her up with a referral if she desires. This would likely consist of an evaluation for ankle arthodesis vs arthroplasty.    She will follow up in 2 months and I will see how she is doing.        "

## 2018-03-29 RX ORDER — POTASSIUM CHLORIDE 1500 MG/1
TABLET, EXTENDED RELEASE ORAL
Qty: 30 TABLET | Refills: 4 | Status: SHIPPED | OUTPATIENT
Start: 2018-03-29 | End: 2020-12-09 | Stop reason: SDUPTHER

## 2018-04-10 ENCOUNTER — PATIENT OUTREACH (OUTPATIENT)
Dept: ADMINISTRATIVE | Facility: HOSPITAL | Age: 73
End: 2018-04-10

## 2018-04-18 ENCOUNTER — OFFICE VISIT (OUTPATIENT)
Dept: FAMILY MEDICINE | Facility: CLINIC | Age: 73
End: 2018-04-18
Payer: MEDICARE

## 2018-04-18 ENCOUNTER — LAB VISIT (OUTPATIENT)
Dept: LAB | Facility: HOSPITAL | Age: 73
End: 2018-04-18
Attending: FAMILY MEDICINE
Payer: MEDICARE

## 2018-04-18 VITALS
WEIGHT: 151.44 LBS | OXYGEN SATURATION: 99 % | BODY MASS INDEX: 24.34 KG/M2 | DIASTOLIC BLOOD PRESSURE: 80 MMHG | SYSTOLIC BLOOD PRESSURE: 120 MMHG | TEMPERATURE: 97 F | HEIGHT: 66 IN | HEART RATE: 70 BPM

## 2018-04-18 DIAGNOSIS — I10 HYPERTENSION, UNSPECIFIED TYPE: Primary | ICD-10-CM

## 2018-04-18 DIAGNOSIS — I35.0 AORTIC VALVE STENOSIS, ETIOLOGY OF CARDIAC VALVE DISEASE UNSPECIFIED: ICD-10-CM

## 2018-04-18 DIAGNOSIS — D64.9 MILD ANEMIA: ICD-10-CM

## 2018-04-18 DIAGNOSIS — I10 HYPERTENSION, UNSPECIFIED TYPE: ICD-10-CM

## 2018-04-18 DIAGNOSIS — Z78.9 STATIN INTOLERANCE: ICD-10-CM

## 2018-04-18 DIAGNOSIS — M81.0 OSTEOPOROSIS, POST-MENOPAUSAL: ICD-10-CM

## 2018-04-18 DIAGNOSIS — E78.2 MIXED HYPERLIPIDEMIA: ICD-10-CM

## 2018-04-18 LAB
ALBUMIN SERPL BCP-MCNC: 3.9 G/DL
ALP SERPL-CCNC: 101 U/L
ALT SERPL W/O P-5'-P-CCNC: 19 U/L
ANION GAP SERPL CALC-SCNC: 9 MMOL/L
AST SERPL-CCNC: 17 U/L
BASOPHILS # BLD AUTO: 0.08 K/UL
BASOPHILS NFR BLD: 1.5 %
BILIRUB SERPL-MCNC: 1.2 MG/DL
BUN SERPL-MCNC: 17 MG/DL
CALCIUM SERPL-MCNC: 10.3 MG/DL
CHLORIDE SERPL-SCNC: 103 MMOL/L
CO2 SERPL-SCNC: 27 MMOL/L
CREAT SERPL-MCNC: 0.7 MG/DL
DIFFERENTIAL METHOD: ABNORMAL
EOSINOPHIL # BLD AUTO: 0.3 K/UL
EOSINOPHIL NFR BLD: 6 %
ERYTHROCYTE [DISTWIDTH] IN BLOOD BY AUTOMATED COUNT: 12.5 %
EST. GFR  (AFRICAN AMERICAN): >60 ML/MIN/1.73 M^2
EST. GFR  (NON AFRICAN AMERICAN): >60 ML/MIN/1.73 M^2
GLUCOSE SERPL-MCNC: 99 MG/DL
HCT VFR BLD AUTO: 40.3 %
HGB BLD-MCNC: 13.1 G/DL
IMM GRANULOCYTES # BLD AUTO: 0.08 K/UL
IMM GRANULOCYTES NFR BLD AUTO: 1.5 %
LYMPHOCYTES # BLD AUTO: 1.4 K/UL
LYMPHOCYTES NFR BLD: 26.2 %
MCH RBC QN AUTO: 28.2 PG
MCHC RBC AUTO-ENTMCNC: 32.5 G/DL
MCV RBC AUTO: 87 FL
MONOCYTES # BLD AUTO: 0.5 K/UL
MONOCYTES NFR BLD: 8.9 %
NEUTROPHILS # BLD AUTO: 3.1 K/UL
NEUTROPHILS NFR BLD: 55.9 %
NRBC BLD-RTO: 0 /100 WBC
PLATELET # BLD AUTO: 318 K/UL
PMV BLD AUTO: 10.3 FL
POTASSIUM SERPL-SCNC: 4.1 MMOL/L
PROT SERPL-MCNC: 7.3 G/DL
RBC # BLD AUTO: 4.64 M/UL
SODIUM SERPL-SCNC: 139 MMOL/L
WBC # BLD AUTO: 5.5 K/UL

## 2018-04-18 PROCEDURE — 80053 COMPREHEN METABOLIC PANEL: CPT

## 2018-04-18 PROCEDURE — 99214 OFFICE O/P EST MOD 30 MIN: CPT | Mod: PBBFAC,PO | Performed by: FAMILY MEDICINE

## 2018-04-18 PROCEDURE — 85025 COMPLETE CBC W/AUTO DIFF WBC: CPT

## 2018-04-18 PROCEDURE — 36415 COLL VENOUS BLD VENIPUNCTURE: CPT | Mod: PO

## 2018-04-18 PROCEDURE — 99999 PR PBB SHADOW E&M-EST. PATIENT-LVL IV: CPT | Mod: PBBFAC,,, | Performed by: FAMILY MEDICINE

## 2018-04-18 PROCEDURE — 99214 OFFICE O/P EST MOD 30 MIN: CPT | Mod: S$PBB,,, | Performed by: FAMILY MEDICINE

## 2018-04-18 NOTE — PROGRESS NOTES
Chief Complaint:    Chief Complaint   Patient presents with    Follow-up       History of Present Illness:    Patient presents today for six-month follow-up,  Her home blood pressure readings are normal she been checking them sometimes.  She has iron deficiency anemia taking iron pills  Aortic stenosis asymptomatic at this time.  Also has anemia with statin intolerance.    ROS:  Review of Systems   Constitutional: Negative for activity change, chills, fatigue, fever and unexpected weight change.   HENT: Negative for congestion, ear discharge, ear pain, hearing loss, postnasal drip and rhinorrhea.    Eyes: Negative for pain and visual disturbance.   Respiratory: Negative for cough, chest tightness and shortness of breath.    Cardiovascular: Negative for chest pain and palpitations.   Gastrointestinal: Negative for abdominal pain, diarrhea and vomiting.   Endocrine: Negative for heat intolerance.   Genitourinary: Negative for dysuria, flank pain, frequency and hematuria.   Musculoskeletal: Negative for back pain, gait problem and neck pain.   Skin: Negative for color change and rash.   Neurological: Negative for dizziness, tremors, seizures, numbness and headaches.   Psychiatric/Behavioral: Negative for agitation, hallucinations, self-injury, sleep disturbance and suicidal ideas. The patient is not nervous/anxious.        Past Medical History:   Diagnosis Date    Arthritis     Hyperlipidemia     Hypertension        Social History:  Social History     Social History    Marital status: Single     Spouse name: N/A    Number of children: N/A    Years of education: N/A     Social History Main Topics    Smoking status: Never Smoker    Smokeless tobacco: Never Used    Alcohol use No    Drug use: No    Sexual activity: No     Other Topics Concern    None     Social History Narrative    None       Family History:   family history includes Emphysema in her mother; Heart disease in her father and mother; Lung  "cancer in her maternal grandfather.    Health Maintenance   Topic Date Due    Lipid Panel  09/27/2018    Mammogram  10/23/2018    Fecal Occult Blood Test (FOBT)/FitKit  10/23/2018    DEXA SCAN  10/06/2019    TETANUS VACCINE  04/04/2024    Colonoscopy  09/19/2024    Hepatitis C Screening  Completed    Zoster Vaccine  Completed    Pneumococcal (65+)  Completed    Influenza Vaccine  Addressed       Physical Exam:    Vital Signs  Temp: 97.4 °F (36.3 °C)  Temp src: Tympanic  Pulse: 70  SpO2: 99 %  BP: 120/80  BP Location: Left arm  Patient Position: Sitting  Pain Score:  (home readings)  Height and Weight  Height: 5' 6.14" (168 cm)  Weight: 68.7 kg (151 lb 7.3 oz)  BSA (Calculated - sq m): 1.79 sq meters  BMI (Calculated): 24.4  Weight in (lb) to have BMI = 25: 155.2]    Body mass index is 24.34 kg/m².    Physical Exam   Constitutional: She is oriented to person, place, and time. She appears well-developed.   HENT:   Mouth/Throat: Oropharynx is clear and moist.   Eyes: Conjunctivae are normal. Pupils are equal, round, and reactive to light.   Neck: Normal range of motion. Neck supple.   Cardiovascular: Normal rate and regular rhythm.    Murmur heard.   Systolic murmur is present with a grade of 3/6   Pulmonary/Chest: Effort normal and breath sounds normal. No respiratory distress. She has no wheezes. She has no rales. She exhibits no tenderness.   Abdominal: Soft. She exhibits no distension and no mass. There is no tenderness. There is no guarding.   Musculoskeletal: She exhibits no edema or tenderness.   Lymphadenopathy:     She has no cervical adenopathy.   Neurological: She is alert and oriented to person, place, and time. She has normal reflexes.   Skin: Skin is warm and dry.   Psychiatric: She has a normal mood and affect. Her behavior is normal. Judgment and thought content normal.       Lab Results   Component Value Date    CHOL 231 (H) 09/27/2017    CHOL 252 (H) 04/10/2015    CHOL 245 (H) 04/01/2014    " TRIG 152 (H) 09/27/2017    TRIG 165 (H) 04/10/2015    TRIG 83 04/01/2014    HDL 66 09/27/2017    HDL 68 04/10/2015    HDL 72 04/01/2014    TOTALCHOLEST 3.5 09/27/2017    TOTALCHOLEST 3.7 04/10/2015    TOTALCHOLEST 3.4 04/01/2014    NONHDLCHOL 165 09/27/2017    NONHDLCHOL 184 04/10/2015    NONHDLCHOL 173 04/01/2014       Lab Results   Component Value Date    HGBA1C 5.4 09/27/2017       Assessment:      ICD-10-CM ICD-9-CM   1. Hypertension, unspecified type I10 401.9   2. Mixed hyperlipidemia E78.2 272.2   3. Osteoporosis, post-menopausal M81.0 733.01   4. Statin intolerance Z78.9 995.27   5. Mild anemia D64.9 285.9   6. Aortic valve stenosis, etiology of cardiac valve disease unspecified I35.0 424.1         Plan:    Continue current medication plan  She'll be referred to cardiology for aortic stenosis  Check labs today follow-up 6 months    Orders Placed This Encounter   Procedures    CBC auto differential    Comprehensive metabolic panel    Ambulatory referral to Cardiology       Current Outpatient Prescriptions   Medication Sig Dispense Refill    coenzyme Q10 (CO Q-10) 100 mg capsule Take 100 mg by mouth once daily.      ferrous sulfate 325 mg (65 mg iron) Tab tablet Take 1 tablet (325 mg total) by mouth daily with breakfast. 60 tablet 0    flaxseed oil 1,000 mg Cap Take 1 capsule by mouth once daily.      glucosamine HCl-msm-chondroitn 750-375-400 mg Tab Take 1 tablet by mouth 2 (two) times daily.      KLOR-CON M20 20 mEq tablet TAKE ONE TABLET BY MOUTH ONCE DAILY 30 tablet 4    losartan-hydrochlorothiazide 50-12.5 mg (HYZAAR) 50-12.5 mg per tablet Take 1 tablet by mouth once daily. 90 tablet 3    turmeric root extract 500 mg Cap Take 1 capsule by mouth 2 (two) times daily.      omega-3 fatty acids-vitamin E (FISH OIL) 1,000 mg Cap Take 1 capsule by mouth once daily.       No current facility-administered medications for this visit.        Medications Discontinued During This Encounter   Medication  Reason    meloxicam (MOBIC) 15 MG tablet        Follow-up in about 6 months (around 10/18/2018).      Porter Vasques MD

## 2018-04-25 DIAGNOSIS — I10 ESSENTIAL HYPERTENSION: Primary | ICD-10-CM

## 2018-04-26 PROBLEM — I35.0 NONRHEUMATIC AORTIC VALVE STENOSIS: Status: ACTIVE | Noted: 2018-04-26

## 2018-04-27 ENCOUNTER — OFFICE VISIT (OUTPATIENT)
Dept: CARDIOLOGY | Facility: CLINIC | Age: 73
End: 2018-04-27
Payer: MEDICARE

## 2018-04-27 ENCOUNTER — CLINICAL SUPPORT (OUTPATIENT)
Dept: CARDIOLOGY | Facility: CLINIC | Age: 73
End: 2018-04-27
Payer: MEDICARE

## 2018-04-27 VITALS
HEART RATE: 66 BPM | DIASTOLIC BLOOD PRESSURE: 78 MMHG | HEIGHT: 66 IN | SYSTOLIC BLOOD PRESSURE: 114 MMHG | BODY MASS INDEX: 25.39 KG/M2 | WEIGHT: 158 LBS

## 2018-04-27 DIAGNOSIS — E78.2 MIXED HYPERLIPIDEMIA: ICD-10-CM

## 2018-04-27 DIAGNOSIS — I10 ESSENTIAL HYPERTENSION: ICD-10-CM

## 2018-04-27 DIAGNOSIS — R07.9 CHEST PAIN: ICD-10-CM

## 2018-04-27 DIAGNOSIS — R07.89 ATYPICAL CHEST PAIN: ICD-10-CM

## 2018-04-27 DIAGNOSIS — Z78.9 STATIN INTOLERANCE: ICD-10-CM

## 2018-04-27 DIAGNOSIS — I35.0 NONRHEUMATIC AORTIC VALVE STENOSIS: Primary | ICD-10-CM

## 2018-04-27 PROCEDURE — 93010 ELECTROCARDIOGRAM REPORT: CPT | Mod: S$PBB,,, | Performed by: NUCLEAR MEDICINE

## 2018-04-27 PROCEDURE — 99204 OFFICE O/P NEW MOD 45 MIN: CPT | Mod: S$PBB,,, | Performed by: INTERNAL MEDICINE

## 2018-04-27 PROCEDURE — 99999 PR PBB SHADOW E&M-EST. PATIENT-LVL II: CPT | Mod: PBBFAC,,, | Performed by: INTERNAL MEDICINE

## 2018-04-27 PROCEDURE — 93005 ELECTROCARDIOGRAM TRACING: CPT | Mod: PBBFAC | Performed by: NUCLEAR MEDICINE

## 2018-04-27 PROCEDURE — 99212 OFFICE O/P EST SF 10 MIN: CPT | Mod: PBBFAC | Performed by: INTERNAL MEDICINE

## 2018-04-27 NOTE — PROGRESS NOTES
Subjective:    Patient ID:  Joyce Marino is a 72 y.o. female who presents for evaluation of Valvular Heart Disease    Pt referred by Dr. Porter Vasques      HPI  Pt referred for aortic stenosis.  She has HTN, hyperlipidemia, AS, AI. Nonsmoker.  Last echo 2015 showed normal EF, mod AS, mild AI.  ecg today shows NSR, 1 av block.  Denies h/o cad, chf, cva.  Every once in a while she gets a sharp pain, left chest, at random, seconds in duration, no associated sxs.  Occurs every few months.         Past Medical History:   Diagnosis Date    Arthritis     Hyperlipidemia     Hypertension        Current Outpatient Prescriptions:     coenzyme Q10 (CO Q-10) 100 mg capsule, Take 100 mg by mouth once daily., Disp: , Rfl:     ferrous sulfate 325 mg (65 mg iron) Tab tablet, Take 1 tablet (325 mg total) by mouth daily with breakfast., Disp: 60 tablet, Rfl: 0    flaxseed oil 1,000 mg Cap, Take 1 capsule by mouth once daily., Disp: , Rfl:     glucosamine HCl-msm-chondroitn 750-375-400 mg Tab, Take 1 tablet by mouth 2 (two) times daily., Disp: , Rfl:     KLOR-CON M20 20 mEq tablet, TAKE ONE TABLET BY MOUTH ONCE DAILY, Disp: 30 tablet, Rfl: 4    losartan-hydrochlorothiazide 50-12.5 mg (HYZAAR) 50-12.5 mg per tablet, Take 1 tablet by mouth once daily., Disp: 90 tablet, Rfl: 3    omega-3 fatty acids-vitamin E (FISH OIL) 1,000 mg Cap, Take 1 capsule by mouth once daily., Disp: , Rfl:     turmeric root extract 500 mg Cap, Take 1 capsule by mouth 2 (two) times daily., Disp: , Rfl:       Review of Systems   Constitution: Negative.   HENT: Negative.    Eyes: Negative.    Cardiovascular: Positive for chest pain.   Respiratory: Negative.    Endocrine: Negative.    Hematologic/Lymphatic: Negative.    Skin: Negative.    Musculoskeletal: Negative.    Gastrointestinal: Negative.    Genitourinary: Negative.    Neurological: Negative.    Psychiatric/Behavioral: Negative.    Allergic/Immunologic: Negative.        /78 (BP Location:  "Left arm)   Pulse 66   Ht 5' 6.14" (1.68 m)   Wt 71.7 kg (158 lb)   BMI 25.39 kg/m²     Wt Readings from Last 3 Encounters:   04/27/18 71.7 kg (158 lb)   04/18/18 68.7 kg (151 lb 7.3 oz)   12/13/17 68.3 kg (150 lb 11 oz)     Temp Readings from Last 3 Encounters:   04/18/18 97.4 °F (36.3 °C) (Tympanic)   10/18/17 96.4 °F (35.8 °C) (Tympanic)   09/27/17 97.3 °F (36.3 °C) (Tympanic)     BP Readings from Last 3 Encounters:   04/27/18 114/78   04/18/18 120/80   12/13/17 (!) 140/70     Pulse Readings from Last 3 Encounters:   04/27/18 66   04/18/18 70   12/13/17 69          Objective:    Physical Exam   Constitutional: She is oriented to person, place, and time. Vital signs are normal. She appears well-developed and well-nourished. She is active and cooperative. She does not have a sickly appearance. She does not appear ill. No distress.   HENT:   Head: Normocephalic.   Neck: Neck supple. Normal carotid pulses, no hepatojugular reflux and no JVD present. Carotid bruit is not present. No thyromegaly present.   Cardiovascular: Normal rate, regular rhythm, S1 normal, S2 normal and normal pulses.  PMI is not displaced.  Exam reveals no gallop and no friction rub.    Murmur heard.   Harsh midsystolic murmur is present with a grade of 3/6  at the upper right sternal border radiating to the neck  Pulses:       Radial pulses are 2+ on the right side, and 2+ on the left side.   Pulmonary/Chest: Effort normal and breath sounds normal. She has no wheezes. She has no rales.   Abdominal: Soft. Normal appearance, normal aorta and bowel sounds are normal. She exhibits no pulsatile liver, no abdominal bruit, no ascites and no mass. There is no splenomegaly or hepatomegaly. There is no tenderness.   Musculoskeletal: She exhibits no edema.   Lymphadenopathy:     She has no cervical adenopathy.   Neurological: She is alert and oriented to person, place, and time.   Skin: Skin is warm. She is not diaphoretic.   Psychiatric: She has a " normal mood and affect. Her behavior is normal.   Nursing note and vitals reviewed.      I have reviewed all pertinent labs and cardiac studies.      Chemistry        Component Value Date/Time     04/18/2018 1016    K 4.1 04/18/2018 1016     04/18/2018 1016    CO2 27 04/18/2018 1016    BUN 17 04/18/2018 1016    CREATININE 0.7 04/18/2018 1016    GLU 99 04/18/2018 1016        Component Value Date/Time    CALCIUM 10.3 04/18/2018 1016    ALKPHOS 101 04/18/2018 1016    AST 17 04/18/2018 1016    ALT 19 04/18/2018 1016    BILITOT 1.2 (H) 04/18/2018 1016    ESTGFRAFRICA >60.0 04/18/2018 1016    EGFRNONAA >60.0 04/18/2018 1016        Lab Results   Component Value Date    WBC 5.50 04/18/2018    HGB 13.1 04/18/2018    HCT 40.3 04/18/2018    MCV 87 04/18/2018     04/18/2018     Lab Results   Component Value Date    HGBA1C 5.4 09/27/2017     Lab Results   Component Value Date    CHOL 231 (H) 09/27/2017    CHOL 252 (H) 04/10/2015    CHOL 245 (H) 04/01/2014     Lab Results   Component Value Date    HDL 66 09/27/2017    HDL 68 04/10/2015    HDL 72 04/01/2014     Lab Results   Component Value Date    LDLCALC 134.6 09/27/2017    LDLCALC 151.0 04/10/2015    LDLCALC 156.4 04/01/2014     Lab Results   Component Value Date    TRIG 152 (H) 09/27/2017    TRIG 165 (H) 04/10/2015    TRIG 83 04/01/2014     Lab Results   Component Value Date    CHOLHDL 28.6 09/27/2017    CHOLHDL 27.0 04/10/2015    CHOLHDL 29.4 04/01/2014           Assessment:       1. Nonrheumatic aortic valve stenosis    2. Essential hypertension    3. Mixed hyperlipidemia    4. Statin intolerance    5. Atypical chest pain         Plan:             Pt counseled on aortic stenosis management and symptoms of AS.  She is deemed to be asymptomatic from AS at present time.  Atypical cp not likely due to AS.   Will update echo to reassess her AS and decide about stress testing after reviewing echo results.  She will need yearly f/u for sure.  Cardiac  diet  Exercise    Phone review for test results.

## 2018-04-27 NOTE — LETTER
April 27, 2018      Porter Vasques MD  [address] [address] [address]           [facility]  [address] [facility] [place] [address]  Phone: [phone]  Fax: [phone]          Patient: Joyce Marino   MR Number: [id number]   YOB: 1945   Date of Visit: 4/27/2018       Dear Dr. Porter Vasques:    Thank you for referring Joyce Marino to me for evaluation. Attached you will find relevant portions of my assessment and plan of care.    If you have questions, please do not hesitate to call me. I look forward to following Joyce Marino along with you.    Sincerely,    Jonathon Lee MD    Enclosure  CC:  No Recipients    If you would like to receive this communication electronically, please contact [email or web address] or [phone] to request more information on [employer] Link access.    For providers and/or their staff who would like to refer a patient to [facility], please contact us through our one-stop-shop provider referral line, [facility] , at [phone].    If you feel you have received this communication in error or would no longer like to receive these types of communications, please e-mail [email or web address]

## 2018-05-02 ENCOUNTER — CLINICAL SUPPORT (OUTPATIENT)
Dept: CARDIOLOGY | Facility: CLINIC | Age: 73
End: 2018-05-02
Attending: INTERNAL MEDICINE
Payer: MEDICARE

## 2018-05-02 DIAGNOSIS — R07.89 ATYPICAL CHEST PAIN: ICD-10-CM

## 2018-05-02 DIAGNOSIS — I35.0 NONRHEUMATIC AORTIC VALVE STENOSIS: ICD-10-CM

## 2018-05-02 DIAGNOSIS — I10 ESSENTIAL HYPERTENSION: ICD-10-CM

## 2018-05-02 PROCEDURE — 93306 TTE W/DOPPLER COMPLETE: CPT | Mod: PBBFAC | Performed by: INTERNAL MEDICINE

## 2018-05-03 ENCOUNTER — TELEPHONE (OUTPATIENT)
Dept: CARDIOLOGY | Facility: HOSPITAL | Age: 73
End: 2018-05-03

## 2018-05-03 LAB
DIASTOLIC DYSFUNCTION: YES
ESTIMATED PA SYSTOLIC PRESSURE: 28.73
RETIRED EF AND QEF - SEE NOTES: 60 (ref 55–65)

## 2018-05-03 NOTE — TELEPHONE ENCOUNTER
Please call pt  Echo shows normal heart strength.  Moderate aortic stenosis noted on echo.  Schedule stress test -- exercise MPI for eval of cp.    Dr Lee

## 2018-05-04 NOTE — TELEPHONE ENCOUNTER
I have attempted without success to contact this patient by phone to inform them of their results. I left a message for them to call back at [phone].

## 2018-05-07 DIAGNOSIS — R07.9 ACUTE CHEST PAIN: ICD-10-CM

## 2018-05-07 DIAGNOSIS — I35.0 AORTIC VALVE STENOSIS, ETIOLOGY OF CARDIAC VALVE DISEASE UNSPECIFIED: Primary | ICD-10-CM

## 2018-05-23 ENCOUNTER — HOSPITAL ENCOUNTER (OUTPATIENT)
Dept: RADIOLOGY | Facility: HOSPITAL | Age: 73
Discharge: HOME | End: 2018-05-23
Attending: INTERNAL MEDICINE
Payer: MEDICARE

## 2018-05-23 ENCOUNTER — CLINICAL SUPPORT (OUTPATIENT)
Dept: CARDIOLOGY | Facility: CLINIC | Age: 73
End: 2018-05-23
Attending: INTERNAL MEDICINE
Payer: MEDICARE

## 2018-05-23 DIAGNOSIS — I35.0 AORTIC VALVE STENOSIS, ETIOLOGY OF CARDIAC VALVE DISEASE UNSPECIFIED: ICD-10-CM

## 2018-05-23 DIAGNOSIS — R07.9 ACUTE CHEST PAIN: ICD-10-CM

## 2018-05-23 LAB — DIASTOLIC DYSFUNCTION: NO

## 2018-05-23 PROCEDURE — 93018 CV STRESS TEST I&R ONLY: CPT | Mod: S$PBB,,, | Performed by: INTERNAL MEDICINE

## 2018-05-23 PROCEDURE — 78452 HT MUSCLE IMAGE SPECT MULT: CPT | Mod: 26,,, | Performed by: INTERNAL MEDICINE

## 2018-05-23 PROCEDURE — 93016 CV STRESS TEST SUPVJ ONLY: CPT | Mod: S$PBB,,, | Performed by: INTERNAL MEDICINE

## 2018-05-23 PROCEDURE — A9502 TC99M TETROFOSMIN: HCPCS | Mod: PO

## 2018-05-24 ENCOUNTER — TELEPHONE (OUTPATIENT)
Dept: CARDIOLOGY | Facility: CLINIC | Age: 73
End: 2018-05-24

## 2018-05-24 NOTE — TELEPHONE ENCOUNTER
Please call pt  She passed her nuclear stress test.  No blockages noted.  Schedule f/u appt in one year with echo.    Dr Lee

## 2018-05-28 NOTE — TELEPHONE ENCOUNTER
The patient has been notified of this information and all questions answered.      Pt 1 year with echo has been placed in recall.

## 2018-09-12 RX ORDER — LOSARTAN POTASSIUM AND HYDROCHLOROTHIAZIDE 12.5; 5 MG/1; MG/1
TABLET ORAL
Qty: 90 TABLET | Refills: 3 | Status: SHIPPED | OUTPATIENT
Start: 2018-09-12 | End: 2019-07-22

## 2018-10-18 ENCOUNTER — LAB VISIT (OUTPATIENT)
Dept: LAB | Facility: HOSPITAL | Age: 73
End: 2018-10-18
Attending: FAMILY MEDICINE
Payer: MEDICARE

## 2018-10-18 ENCOUNTER — OFFICE VISIT (OUTPATIENT)
Dept: FAMILY MEDICINE | Facility: CLINIC | Age: 73
End: 2018-10-18
Payer: MEDICARE

## 2018-10-18 VITALS
HEART RATE: 61 BPM | WEIGHT: 148.5 LBS | OXYGEN SATURATION: 98 % | BODY MASS INDEX: 23.87 KG/M2 | DIASTOLIC BLOOD PRESSURE: 70 MMHG | TEMPERATURE: 96 F | HEIGHT: 66 IN | SYSTOLIC BLOOD PRESSURE: 157 MMHG

## 2018-10-18 DIAGNOSIS — D64.9 CHRONIC ANEMIA: ICD-10-CM

## 2018-10-18 DIAGNOSIS — M79.10 MYALGIA DUE TO HMG COA REDUCTASE INHIBITOR: ICD-10-CM

## 2018-10-18 DIAGNOSIS — I10 ESSENTIAL HYPERTENSION: ICD-10-CM

## 2018-10-18 DIAGNOSIS — M81.0 OSTEOPOROSIS, POST-MENOPAUSAL: ICD-10-CM

## 2018-10-18 DIAGNOSIS — T46.6X5A MYALGIA DUE TO HMG COA REDUCTASE INHIBITOR: ICD-10-CM

## 2018-10-18 DIAGNOSIS — I10 ESSENTIAL HYPERTENSION: Primary | ICD-10-CM

## 2018-10-18 LAB
ALBUMIN SERPL BCP-MCNC: 4 G/DL
ALP SERPL-CCNC: 85 U/L
ALT SERPL W/O P-5'-P-CCNC: 20 U/L
ANION GAP SERPL CALC-SCNC: 8 MMOL/L
AST SERPL-CCNC: 20 U/L
BASOPHILS # BLD AUTO: 0.06 K/UL
BASOPHILS NFR BLD: 1 %
BILIRUB SERPL-MCNC: 1.2 MG/DL
BUN SERPL-MCNC: 14 MG/DL
CALCIUM SERPL-MCNC: 10.8 MG/DL
CHLORIDE SERPL-SCNC: 105 MMOL/L
CHOLEST SERPL-MCNC: 281 MG/DL
CHOLEST/HDLC SERPL: 4.2 {RATIO}
CO2 SERPL-SCNC: 28 MMOL/L
CREAT SERPL-MCNC: 0.7 MG/DL
DIFFERENTIAL METHOD: NORMAL
EOSINOPHIL # BLD AUTO: 0.4 K/UL
EOSINOPHIL NFR BLD: 6.2 %
ERYTHROCYTE [DISTWIDTH] IN BLOOD BY AUTOMATED COUNT: 12.4 %
EST. GFR  (AFRICAN AMERICAN): >60 ML/MIN/1.73 M^2
EST. GFR  (NON AFRICAN AMERICAN): >60 ML/MIN/1.73 M^2
GLUCOSE SERPL-MCNC: 102 MG/DL
HCT VFR BLD AUTO: 41.2 %
HDLC SERPL-MCNC: 67 MG/DL
HDLC SERPL: 23.8 %
HGB BLD-MCNC: 13.8 G/DL
IMM GRANULOCYTES # BLD AUTO: 0.02 K/UL
IMM GRANULOCYTES NFR BLD AUTO: 0.3 %
LDLC SERPL CALC-MCNC: 176 MG/DL
LYMPHOCYTES # BLD AUTO: 1.5 K/UL
LYMPHOCYTES NFR BLD: 24.6 %
MCH RBC QN AUTO: 29.6 PG
MCHC RBC AUTO-ENTMCNC: 33.5 G/DL
MCV RBC AUTO: 88 FL
MONOCYTES # BLD AUTO: 0.7 K/UL
MONOCYTES NFR BLD: 10.7 %
NEUTROPHILS # BLD AUTO: 3.5 K/UL
NEUTROPHILS NFR BLD: 57.2 %
NONHDLC SERPL-MCNC: 214 MG/DL
NRBC BLD-RTO: 0 /100 WBC
PLATELET # BLD AUTO: 274 K/UL
PMV BLD AUTO: 10.6 FL
POTASSIUM SERPL-SCNC: 4.2 MMOL/L
PROT SERPL-MCNC: 7.4 G/DL
RBC # BLD AUTO: 4.66 M/UL
SODIUM SERPL-SCNC: 141 MMOL/L
TRIGL SERPL-MCNC: 190 MG/DL
WBC # BLD AUTO: 6.14 K/UL

## 2018-10-18 PROCEDURE — 36415 COLL VENOUS BLD VENIPUNCTURE: CPT | Mod: PO

## 2018-10-18 PROCEDURE — 99214 OFFICE O/P EST MOD 30 MIN: CPT | Mod: S$PBB,,, | Performed by: FAMILY MEDICINE

## 2018-10-18 PROCEDURE — 80053 COMPREHEN METABOLIC PANEL: CPT

## 2018-10-18 PROCEDURE — 99213 OFFICE O/P EST LOW 20 MIN: CPT | Mod: PBBFAC,PO | Performed by: FAMILY MEDICINE

## 2018-10-18 PROCEDURE — 80061 LIPID PANEL: CPT

## 2018-10-18 PROCEDURE — 85025 COMPLETE CBC W/AUTO DIFF WBC: CPT

## 2018-10-18 PROCEDURE — 99999 PR PBB SHADOW E&M-EST. PATIENT-LVL III: CPT | Mod: PBBFAC,,, | Performed by: FAMILY MEDICINE

## 2018-10-18 RX ORDER — ALENDRONATE SODIUM 70 MG/1
70 TABLET ORAL
Qty: 4 TABLET | Refills: 11 | Status: SHIPPED | OUTPATIENT
Start: 2018-10-18 | End: 2019-04-22

## 2018-10-18 NOTE — PROGRESS NOTES
Chief Complaint:    Chief Complaint   Patient presents with    Follow-up       History of Present Illness:    Patient presents today for six-month follow-up:  She is doing okay she stays pretty active she is taking go the or cyst.  She checks her home blood pressure readings and they are on okay in the mornings about 140 in the afternoon on the come down.  She denies any complaints    She has statin intolerance therefore is not on statin therapy    She also has osteoporosis she has refused treatment in the past but after hearing about osteoporosis in the hazards of it again she is willing to let let me try treatment    ROS:  Review of Systems   Constitutional: Negative for activity change, chills, fatigue, fever and unexpected weight change.   HENT: Negative for congestion, ear discharge, ear pain, hearing loss, postnasal drip and rhinorrhea.    Eyes: Negative for pain and visual disturbance.   Respiratory: Negative for cough, chest tightness and shortness of breath.    Cardiovascular: Negative for chest pain and palpitations.   Gastrointestinal: Negative for abdominal pain, diarrhea and vomiting.   Endocrine: Negative for heat intolerance.   Genitourinary: Negative for dysuria, flank pain, frequency and hematuria.   Musculoskeletal: Negative for back pain, gait problem and neck pain.   Skin: Negative for color change and rash.   Neurological: Negative for dizziness, tremors, seizures, numbness and headaches.   Psychiatric/Behavioral: Negative for agitation, hallucinations, self-injury, sleep disturbance and suicidal ideas. The patient is not nervous/anxious.        Past Medical History:   Diagnosis Date    Arthritis     Hyperlipidemia     Hypertension        Social History:  Social History     Socioeconomic History    Marital status: Single     Spouse name: None    Number of children: None    Years of education: None    Highest education level: None   Social Needs    Financial resource strain: None     "Food insecurity - worry: None    Food insecurity - inability: None    Transportation needs - medical: None    Transportation needs - non-medical: None   Occupational History    None   Tobacco Use    Smoking status: Never Smoker    Smokeless tobacco: Never Used   Substance and Sexual Activity    Alcohol use: No    Drug use: No    Sexual activity: No     Partners: Male     Birth control/protection: None   Other Topics Concern    None   Social History Narrative    None       Family History:   family history includes Emphysema in her mother; Heart disease in her father and mother; Lung cancer in her maternal grandfather.    Health Maintenance   Topic Date Due    Lipid Panel  09/27/2018    Mammogram  10/23/2018    Fecal Occult Blood Test (FOBT)/FitKit  10/23/2018    DEXA SCAN  10/06/2019    TETANUS VACCINE  04/04/2024    Colonoscopy  09/19/2024    Hepatitis C Screening  Completed    Zoster Vaccine  Completed    Pneumococcal (65+)  Completed    Influenza Vaccine  Discontinued       Physical Exam:    Vital Signs  Temp: 96 °F (35.6 °C)  Temp src: Tympanic  Pulse: 61  SpO2: 98 %  BP: (!) 157/70  BP Location: Left arm  Patient Position: Sitting  Pain Score: 0-No pain  Height and Weight  Height: 5' 6" (167.6 cm)  Weight: 67.4 kg (148 lb 7.7 oz)  BSA (Calculated - sq m): 1.77 sq meters  BMI (Calculated): 24  Weight in (lb) to have BMI = 25: 154.6]    Body mass index is 23.97 kg/m².    Physical Exam   Constitutional: She is oriented to person, place, and time. She appears well-developed.   HENT:   Mouth/Throat: Oropharynx is clear and moist.   Eyes: Conjunctivae are normal. Pupils are equal, round, and reactive to light.   Neck: Normal range of motion. Neck supple.   Cardiovascular: Normal rate and regular rhythm.   Murmur heard.   Systolic murmur is present with a grade of 3/6.  Pulmonary/Chest: Effort normal and breath sounds normal. No respiratory distress. She has no wheezes. She has no rales. She " exhibits no tenderness.   Abdominal: Soft. She exhibits no distension and no mass. There is no tenderness. There is no guarding.   Musculoskeletal: She exhibits no edema or tenderness.   Lymphadenopathy:     She has no cervical adenopathy.   Neurological: She is alert and oriented to person, place, and time. She has normal reflexes.   Skin: Skin is warm and dry.   Psychiatric: She has a normal mood and affect. Her behavior is normal. Judgment and thought content normal.         Assessment:      ICD-10-CM ICD-9-CM   1. Essential hypertension I10 401.9   2. Osteoporosis, post-menopausal M81.0 733.01   3. Myalgia due to HMG CoA reductase inhibitor M79.10 729.1    T46.6X5A E942.2   4. Chronic anemia D64.9 285.9         Plan:    Please check home blood pressure readings and send us the numbers  Will start on Fosamax 70 mg weekly taken on empty stomach with a full loss of water sit upright 30 min after taking the medication.  Watch for any burning sensation stomach or any muscle pain fatigue  Hyperlipidemia with statin intolerance work with diet alone  Chronic anemia recheck a CBC        Orders Placed This Encounter   Procedures    CBC auto differential    Comprehensive metabolic panel    Lipid panel       Current Outpatient Medications   Medication Sig Dispense Refill    ferrous sulfate 325 mg (65 mg iron) Tab tablet Take 1 tablet (325 mg total) by mouth daily with breakfast. 60 tablet 0    flaxseed oil 1,000 mg Cap Take 1 capsule by mouth once daily.      glucosamine HCl-msm-chondroitn 750-375-400 mg Tab Take 1 tablet by mouth 2 (two) times daily.      KLOR-CON M20 20 mEq tablet TAKE ONE TABLET BY MOUTH ONCE DAILY 30 tablet 4    losartan-hydrochlorothiazide 50-12.5 mg (HYZAAR) 50-12.5 mg per tablet TAKE ONE TABLET BY MOUTH ONCE DAILY 90 tablet 3    omega-3 fatty acids-vitamin E (FISH OIL) 1,000 mg Cap Take 1 capsule by mouth once daily.      turmeric root extract 500 mg Cap Take 1 capsule by mouth 2 (two)  times daily.      alendronate (FOSAMAX) 70 MG tablet Take 1 tablet (70 mg total) by mouth every 7 days. 4 tablet 11     No current facility-administered medications for this visit.        Medications Discontinued During This Encounter   Medication Reason    coenzyme Q10 (CO Q-10) 100 mg capsule Patient no longer taking       Follow-up in about 6 months (around 4/18/2019).      Porter Vasques MD

## 2018-10-22 ENCOUNTER — TELEPHONE (OUTPATIENT)
Dept: FAMILY MEDICINE | Facility: CLINIC | Age: 73
End: 2018-10-22

## 2018-10-22 DIAGNOSIS — Z12.39 BREAST CANCER SCREENING: Primary | ICD-10-CM

## 2018-10-29 ENCOUNTER — TELEPHONE (OUTPATIENT)
Dept: FAMILY MEDICINE | Facility: CLINIC | Age: 73
End: 2018-10-29

## 2018-10-29 DIAGNOSIS — E87.1 LOW SODIUM LEVELS: Primary | ICD-10-CM

## 2018-11-05 ENCOUNTER — PATIENT MESSAGE (OUTPATIENT)
Dept: FAMILY MEDICINE | Facility: CLINIC | Age: 73
End: 2018-11-05

## 2018-11-09 ENCOUNTER — PATIENT OUTREACH (OUTPATIENT)
Dept: ADMINISTRATIVE | Facility: HOSPITAL | Age: 73
End: 2018-11-09

## 2018-11-23 ENCOUNTER — HOSPITAL ENCOUNTER (OUTPATIENT)
Dept: RADIOLOGY | Facility: HOSPITAL | Age: 73
Discharge: HOME | End: 2018-11-23
Attending: FAMILY MEDICINE
Payer: MEDICARE

## 2018-11-23 DIAGNOSIS — Z12.39 BREAST CANCER SCREENING: ICD-10-CM

## 2018-11-23 PROCEDURE — 77067 SCR MAMMO BI INCL CAD: CPT | Mod: 26,,, | Performed by: RADIOLOGY

## 2018-11-23 PROCEDURE — 77063 BREAST TOMOSYNTHESIS BI: CPT | Mod: 26,,, | Performed by: RADIOLOGY

## 2018-11-23 PROCEDURE — 77063 BREAST TOMOSYNTHESIS BI: CPT | Mod: TC

## 2019-04-22 ENCOUNTER — OFFICE VISIT (OUTPATIENT)
Dept: FAMILY MEDICINE | Facility: CLINIC | Age: 74
End: 2019-04-22
Payer: MEDICARE

## 2019-04-22 ENCOUNTER — LAB VISIT (OUTPATIENT)
Dept: LAB | Facility: HOSPITAL | Age: 74
End: 2019-04-22
Attending: FAMILY MEDICINE
Payer: MEDICARE

## 2019-04-22 VITALS
HEART RATE: 69 BPM | BODY MASS INDEX: 24.1 KG/M2 | DIASTOLIC BLOOD PRESSURE: 80 MMHG | TEMPERATURE: 97 F | SYSTOLIC BLOOD PRESSURE: 136 MMHG | WEIGHT: 149.94 LBS | HEIGHT: 66 IN | OXYGEN SATURATION: 97 %

## 2019-04-22 DIAGNOSIS — Z12.11 COLON CANCER SCREENING: ICD-10-CM

## 2019-04-22 DIAGNOSIS — M81.0 OSTEOPOROSIS, POST-MENOPAUSAL: Primary | ICD-10-CM

## 2019-04-22 DIAGNOSIS — I35.0 NONRHEUMATIC AORTIC VALVE STENOSIS: ICD-10-CM

## 2019-04-22 DIAGNOSIS — E78.2 MIXED HYPERLIPIDEMIA: ICD-10-CM

## 2019-04-22 DIAGNOSIS — I10 ESSENTIAL HYPERTENSION: ICD-10-CM

## 2019-04-22 LAB
ALBUMIN SERPL BCP-MCNC: 4 G/DL (ref 3.5–5.2)
ALP SERPL-CCNC: 80 U/L (ref 55–135)
ALT SERPL W/O P-5'-P-CCNC: 18 U/L (ref 10–44)
ANION GAP SERPL CALC-SCNC: 10 MMOL/L (ref 8–16)
AST SERPL-CCNC: 17 U/L (ref 10–40)
BASOPHILS # BLD AUTO: 0.06 K/UL (ref 0–0.2)
BASOPHILS NFR BLD: 1 % (ref 0–1.9)
BILIRUB SERPL-MCNC: 1.6 MG/DL (ref 0.1–1)
BUN SERPL-MCNC: 18 MG/DL (ref 8–23)
CALCIUM SERPL-MCNC: 11.4 MG/DL (ref 8.7–10.5)
CHLORIDE SERPL-SCNC: 104 MMOL/L (ref 95–110)
CHOLEST SERPL-MCNC: 251 MG/DL (ref 120–199)
CHOLEST/HDLC SERPL: 3.6 {RATIO} (ref 2–5)
CO2 SERPL-SCNC: 27 MMOL/L (ref 23–29)
CREAT SERPL-MCNC: 0.6 MG/DL (ref 0.5–1.4)
DIFFERENTIAL METHOD: NORMAL
EOSINOPHIL # BLD AUTO: 0.3 K/UL (ref 0–0.5)
EOSINOPHIL NFR BLD: 5.1 % (ref 0–8)
ERYTHROCYTE [DISTWIDTH] IN BLOOD BY AUTOMATED COUNT: 12.6 % (ref 11.5–14.5)
EST. GFR  (AFRICAN AMERICAN): >60 ML/MIN/1.73 M^2
EST. GFR  (NON AFRICAN AMERICAN): >60 ML/MIN/1.73 M^2
GLUCOSE SERPL-MCNC: 91 MG/DL (ref 70–110)
HCT VFR BLD AUTO: 40.2 % (ref 37–48.5)
HDLC SERPL-MCNC: 69 MG/DL (ref 40–75)
HDLC SERPL: 27.5 % (ref 20–50)
HGB BLD-MCNC: 13.6 G/DL (ref 12–16)
IMM GRANULOCYTES # BLD AUTO: 0.01 K/UL (ref 0–0.04)
IMM GRANULOCYTES NFR BLD AUTO: 0.2 % (ref 0–0.5)
LDLC SERPL CALC-MCNC: 155.2 MG/DL (ref 63–159)
LYMPHOCYTES # BLD AUTO: 1.4 K/UL (ref 1–4.8)
LYMPHOCYTES NFR BLD: 23.2 % (ref 18–48)
MCH RBC QN AUTO: 29.3 PG (ref 27–31)
MCHC RBC AUTO-ENTMCNC: 33.8 G/DL (ref 32–36)
MCV RBC AUTO: 87 FL (ref 82–98)
MONOCYTES # BLD AUTO: 0.6 K/UL (ref 0.3–1)
MONOCYTES NFR BLD: 10.9 % (ref 4–15)
NEUTROPHILS # BLD AUTO: 3.5 K/UL (ref 1.8–7.7)
NEUTROPHILS NFR BLD: 59.6 % (ref 38–73)
NONHDLC SERPL-MCNC: 182 MG/DL
NRBC BLD-RTO: 0 /100 WBC
PLATELET # BLD AUTO: 289 K/UL (ref 150–350)
PMV BLD AUTO: 10.9 FL (ref 9.2–12.9)
POTASSIUM SERPL-SCNC: 4.1 MMOL/L (ref 3.5–5.1)
PROT SERPL-MCNC: 7.5 G/DL (ref 6–8.4)
RBC # BLD AUTO: 4.64 M/UL (ref 4–5.4)
SODIUM SERPL-SCNC: 141 MMOL/L (ref 136–145)
TRIGL SERPL-MCNC: 134 MG/DL (ref 30–150)
WBC # BLD AUTO: 5.86 K/UL (ref 3.9–12.7)

## 2019-04-22 PROCEDURE — 99214 PR OFFICE/OUTPT VISIT, EST, LEVL IV, 30-39 MIN: ICD-10-PCS | Mod: S$PBB,,, | Performed by: FAMILY MEDICINE

## 2019-04-22 PROCEDURE — 99213 OFFICE O/P EST LOW 20 MIN: CPT | Mod: PBBFAC,PO | Performed by: FAMILY MEDICINE

## 2019-04-22 PROCEDURE — 99999 PR PBB SHADOW E&M-EST. PATIENT-LVL III: CPT | Mod: PBBFAC,,, | Performed by: FAMILY MEDICINE

## 2019-04-22 PROCEDURE — 99214 OFFICE O/P EST MOD 30 MIN: CPT | Mod: S$PBB,,, | Performed by: FAMILY MEDICINE

## 2019-04-22 PROCEDURE — 80061 LIPID PANEL: CPT

## 2019-04-22 PROCEDURE — 85025 COMPLETE CBC W/AUTO DIFF WBC: CPT

## 2019-04-22 PROCEDURE — 99999 PR PBB SHADOW E&M-EST. PATIENT-LVL III: ICD-10-PCS | Mod: PBBFAC,,, | Performed by: FAMILY MEDICINE

## 2019-04-22 PROCEDURE — 36415 COLL VENOUS BLD VENIPUNCTURE: CPT | Mod: PO

## 2019-04-22 PROCEDURE — 80053 COMPREHEN METABOLIC PANEL: CPT

## 2019-04-22 NOTE — PROGRESS NOTES
Chief Complaint:    Chief Complaint   Patient presents with    Follow-up       History of Present Illness:  Presents today for six-month follow-up:  Blood pressure is okay today  She has osteoporosis could not tolerate Fosamax had significant heartburn so she stopped using it  Also has significant osteoarthrosis.      ROS:  Review of Systems   Constitutional: Negative for activity change, chills, fatigue, fever and unexpected weight change.   HENT: Negative for congestion, ear discharge, ear pain, hearing loss, postnasal drip and rhinorrhea.    Eyes: Negative for pain and visual disturbance.   Respiratory: Negative for cough, chest tightness and shortness of breath.    Cardiovascular: Negative for chest pain and palpitations.   Gastrointestinal: Negative for abdominal pain, diarrhea and vomiting.   Endocrine: Negative for heat intolerance.   Genitourinary: Negative for dysuria, flank pain, frequency and hematuria.   Musculoskeletal: Negative for back pain, gait problem and neck pain.   Skin: Negative for color change and rash.   Neurological: Negative for dizziness, tremors, seizures, numbness and headaches.   Psychiatric/Behavioral: Negative for agitation, hallucinations, self-injury, sleep disturbance and suicidal ideas. The patient is not nervous/anxious.        Past Medical History:   Diagnosis Date    Arthritis     Hyperlipidemia     Hypertension        Social History:  Social History     Socioeconomic History    Marital status: Single     Spouse name: Not on file    Number of children: Not on file    Years of education: Not on file    Highest education level: Not on file   Occupational History    Not on file   Social Needs    Financial resource strain: Not on file    Food insecurity:     Worry: Not on file     Inability: Not on file    Transportation needs:     Medical: Not on file     Non-medical: Not on file   Tobacco Use    Smoking status: Never Smoker    Smokeless tobacco: Never Used  "  Substance and Sexual Activity    Alcohol use: No    Drug use: No    Sexual activity: Never     Partners: Male     Birth control/protection: None   Lifestyle    Physical activity:     Days per week: Not on file     Minutes per session: Not on file    Stress: Not on file   Relationships    Social connections:     Talks on phone: Not on file     Gets together: Not on file     Attends Gnosticist service: Not on file     Active member of club or organization: Not on file     Attends meetings of clubs or organizations: Not on file     Relationship status: Not on file   Other Topics Concern    Not on file   Social History Narrative    Not on file       Family History:   family history includes Emphysema in her mother; Heart disease in her father and mother; Lung cancer in her maternal grandfather.    Health Maintenance   Topic Date Due    Fecal Occult Blood Test (FOBT)/FitKit  10/23/2018    DEXA SCAN  10/06/2019    Lipid Panel  10/18/2019    Mammogram  11/23/2019    TETANUS VACCINE  04/04/2024    Colonoscopy  09/19/2024    Hepatitis C Screening  Completed    Zoster Vaccine  Completed    Pneumococcal Vaccine (65+ Low/Medium Risk)  Completed    Influenza Vaccine  Discontinued       Physical Exam:    Vital Signs  Temp: 96.9 °F (36.1 °C)  Temp src: Tympanic  Pulse: 69  SpO2: 97 %  BP: 136/80  BP Location: Left arm  Patient Position: Sitting  Pain Score: 0-No pain  Height and Weight  Height: 5' 6" (167.6 cm)  Weight: 68 kg (149 lb 14.6 oz)  BSA (Calculated - sq m): 1.78 sq meters  BMI (Calculated): 24.2  Weight in (lb) to have BMI = 25: 154.6]    Body mass index is 24.2 kg/m².    Physical Exam   Constitutional: She is oriented to person, place, and time. She appears well-developed.   HENT:   Mouth/Throat: Oropharynx is clear and moist.   Eyes: Pupils are equal, round, and reactive to light. Conjunctivae are normal.   Neck: Normal range of motion. Neck supple.   Cardiovascular: Normal rate and regular rhythm. "   Murmur heard.   Systolic murmur is present with a grade of 3/6.  Pulmonary/Chest: Effort normal and breath sounds normal. No respiratory distress. She has no wheezes. She has no rales. She exhibits no tenderness.   Abdominal: Soft. She exhibits no distension and no mass. There is no tenderness. There is no guarding.   Musculoskeletal: She exhibits no edema or tenderness.   Lymphadenopathy:     She has no cervical adenopathy.   Neurological: She is alert and oriented to person, place, and time. She has normal reflexes.   Skin: Skin is warm and dry.   Psychiatric: She has a normal mood and affect. Her behavior is normal. Judgment and thought content normal.         Assessment:      ICD-10-CM ICD-9-CM   1. Osteoporosis, post-menopausal M81.0 733.01   2. Essential hypertension I10 401.9   3. Mixed hyperlipidemia E78.2 272.2   4. Colon cancer screening Z12.11 V76.51         Plan:    Refer to Rheumatology for IV Reclast or Prolia for osteoporosis due to intolerance to oral bisphosphonate  Continue monitor home blood pressure readings  Check labs as below  Fit kit given today    Orders Placed This Encounter   Procedures    CBC auto differential    Comprehensive metabolic panel    Lipid panel    Fecal Immunochemical Test (iFOBT)    Ambulatory referral to Rheumatology       Current Outpatient Medications   Medication Sig Dispense Refill    ferrous sulfate 325 mg (65 mg iron) Tab tablet Take 1 tablet (325 mg total) by mouth daily with breakfast. 60 tablet 0    glucosamine HCl-msm-chondroitn 750-375-400 mg Tab Take 1 tablet by mouth 2 (two) times daily.      KLOR-CON M20 20 mEq tablet TAKE ONE TABLET BY MOUTH ONCE DAILY 30 tablet 4    Lactobacillus acidophilus (PROBIOTIC) 10 billion cell Cap Take 1 capsule by mouth once daily.      losartan-hydrochlorothiazide 50-12.5 mg (HYZAAR) 50-12.5 mg per tablet TAKE ONE TABLET BY MOUTH ONCE DAILY 90 tablet 3    multivitamin with minerals tablet Take 1 tablet by mouth once  daily.      turmeric root extract 500 mg Cap Take 1 capsule by mouth 2 (two) times daily.       No current facility-administered medications for this visit.        Medications Discontinued During This Encounter   Medication Reason    omega-3 fatty acids-vitamin E (FISH OIL) 1,000 mg Cap Patient no longer taking    flaxseed oil 1,000 mg Cap Patient no longer taking    alendronate (FOSAMAX) 70 MG tablet Patient no longer taking       Follow up in about 6 months (around 10/22/2019).      Porter Vasques MD

## 2019-04-28 DIAGNOSIS — E83.52 HYPERCALCEMIA: Primary | ICD-10-CM

## 2019-04-29 ENCOUNTER — OFFICE VISIT (OUTPATIENT)
Dept: RHEUMATOLOGY | Facility: CLINIC | Age: 74
End: 2019-04-29
Payer: MEDICARE

## 2019-04-29 ENCOUNTER — LAB VISIT (OUTPATIENT)
Dept: LAB | Facility: HOSPITAL | Age: 74
End: 2019-04-29
Attending: STUDENT IN AN ORGANIZED HEALTH CARE EDUCATION/TRAINING PROGRAM
Payer: MEDICARE

## 2019-04-29 VITALS
SYSTOLIC BLOOD PRESSURE: 132 MMHG | BODY MASS INDEX: 24.17 KG/M2 | HEART RATE: 76 BPM | DIASTOLIC BLOOD PRESSURE: 68 MMHG | HEIGHT: 66 IN | WEIGHT: 150.38 LBS

## 2019-04-29 DIAGNOSIS — M81.0 OSTEOPOROSIS, UNSPECIFIED OSTEOPOROSIS TYPE, UNSPECIFIED PATHOLOGICAL FRACTURE PRESENCE: ICD-10-CM

## 2019-04-29 DIAGNOSIS — M19.90 OSTEOARTHRITIS, UNSPECIFIED OSTEOARTHRITIS TYPE, UNSPECIFIED SITE: Primary | ICD-10-CM

## 2019-04-29 LAB — 25(OH)D3+25(OH)D2 SERPL-MCNC: 28 NG/ML (ref 30–96)

## 2019-04-29 PROCEDURE — 99999 PR PBB SHADOW E&M-EST. PATIENT-LVL IV: CPT | Mod: PBBFAC,,, | Performed by: STUDENT IN AN ORGANIZED HEALTH CARE EDUCATION/TRAINING PROGRAM

## 2019-04-29 PROCEDURE — 36415 COLL VENOUS BLD VENIPUNCTURE: CPT

## 2019-04-29 PROCEDURE — 99204 PR OFFICE/OUTPT VISIT, NEW, LEVL IV, 45-59 MIN: ICD-10-PCS | Mod: S$PBB,,, | Performed by: STUDENT IN AN ORGANIZED HEALTH CARE EDUCATION/TRAINING PROGRAM

## 2019-04-29 PROCEDURE — 82306 VITAMIN D 25 HYDROXY: CPT

## 2019-04-29 PROCEDURE — 99999 PR PBB SHADOW E&M-EST. PATIENT-LVL IV: ICD-10-PCS | Mod: PBBFAC,,, | Performed by: STUDENT IN AN ORGANIZED HEALTH CARE EDUCATION/TRAINING PROGRAM

## 2019-04-29 PROCEDURE — 99204 OFFICE O/P NEW MOD 45 MIN: CPT | Mod: S$PBB,,, | Performed by: STUDENT IN AN ORGANIZED HEALTH CARE EDUCATION/TRAINING PROGRAM

## 2019-04-29 PROCEDURE — 99214 OFFICE O/P EST MOD 30 MIN: CPT | Mod: PBBFAC,PN | Performed by: STUDENT IN AN ORGANIZED HEALTH CARE EDUCATION/TRAINING PROGRAM

## 2019-04-29 PROCEDURE — 96372 THER/PROPH/DIAG INJ SC/IM: CPT | Mod: PBBFAC,PN

## 2019-04-29 RX ORDER — TRAMADOL HYDROCHLORIDE 50 MG/1
50 TABLET ORAL
Qty: 30 TABLET | Refills: 3 | Status: SHIPPED | OUTPATIENT
Start: 2019-04-29 | End: 2019-10-22

## 2019-04-29 RX ADMIN — DENOSUMAB 60 MG: 60 INJECTION SUBCUTANEOUS at 09:04

## 2019-04-29 NOTE — PROGRESS NOTES
RHEUMATOLOGY OUTPATIENT CLINIC NOTE    4/29/2019    Attending Rheumatologist: Yoon Pennington  Primary Care Provider: Porter Vasques MD   Physician Requesting Consultation: Porter Vasques MD  [address] [address] [address]  Chief Complaint/Reason For Consultation:  Osteoporosis      Subjective:       HPI  Joyce Marino is a 73 y.o. White female presents for evaluation of osteoporosis and ankle pain. Has hx of severe osteoarthritis in left ankle. Previous hx of fx x 2 in LLE s/p fall. Previous hx of fosamax. Unable to tolerate 2.2 GERD. Pain currently 6/10. Relieved w aleve usually. Not on daily ca/vit d. No other acute issues or complaints         Review of Systems   Constitutional: Negative for fever and weight loss.   HENT: Negative for ear discharge and hearing loss.    Eyes: Negative for blurred vision and photophobia.   Respiratory: Negative for cough and sputum production.    Cardiovascular: Negative for chest pain and orthopnea.   Gastrointestinal: Negative for abdominal pain, heartburn and vomiting.   Genitourinary: Negative for dysuria and frequency.   Musculoskeletal: Negative for back pain and myalgias.   Skin: Negative for itching and rash.   Neurological: Negative for dizziness and tremors.   Endo/Heme/Allergies: Negative for polydipsia. Does not bruise/bleed easily.   Psychiatric/Behavioral: Negative for depression and substance abuse.       Chronic comorbid conditions affecting medical decision making today:  Past Medical History:   Diagnosis Date    Arthritis     Hyperlipidemia     Hypertension      Past Surgical History:   Procedure Laterality Date    HYSTERECTOMY  1992    for fibroids    OOPHORECTOMY      BSO at time of hysterectomy    imchael in right leg       Family History   Problem Relation Age of Onset    Heart disease Mother     Emphysema Mother     Heart disease Father     Lung cancer Maternal Grandfather      Social History     Substance and Sexual Activity    Alcohol Use No     Social History     Tobacco Use   Smoking Status Never Smoker   Smokeless Tobacco Never Used     Social History     Substance and Sexual Activity   Drug Use No       Current Outpatient Medications:     ferrous sulfate 325 mg (65 mg iron) Tab tablet, Take 1 tablet (325 mg total) by mouth daily with breakfast., Disp: 60 tablet, Rfl: 0    glucosamine HCl-msm-chondroitn 750-375-400 mg Tab, Take 1 tablet by mouth 2 (two) times daily., Disp: , Rfl:     KLOR-CON M20 20 mEq tablet, TAKE ONE TABLET BY MOUTH ONCE DAILY, Disp: 30 tablet, Rfl: 4    Lactobacillus acidophilus (PROBIOTIC) 10 billion cell Cap, Take 1 capsule by mouth once daily., Disp: , Rfl:     losartan-hydrochlorothiazide 50-12.5 mg (HYZAAR) 50-12.5 mg per tablet, TAKE ONE TABLET BY MOUTH ONCE DAILY, Disp: 90 tablet, Rfl: 3    multivitamin with minerals tablet, Take 1 tablet by mouth once daily., Disp: , Rfl:     turmeric root extract 500 mg Cap, Take 1 capsule by mouth 2 (two) times daily., Disp: , Rfl:     traMADol (ULTRAM) 50 mg tablet, Take 1 tablet (50 mg total) by mouth every 24 hours as needed for Pain., Disp: 30 tablet, Rfl: 3    Current Facility-Administered Medications:     denosumab (PROLIA) injection 60 mg, 60 mg, Subcutaneous, Q6 Months, Yoon Pennington MD       Objective:         Vitals:    04/29/19 0920   BP: 132/68   Pulse: 76     Physical Exam   Constitutional: She is oriented to person, place, and time and well-developed, well-nourished, and in no distress. No distress.   HENT:   Head: Normocephalic and atraumatic.   Eyes: Conjunctivae and EOM are normal. Pupils are equal, round, and reactive to light.   Neck: Normal range of motion. Neck supple.   Cardiovascular: Normal rate and regular rhythm.    Pulmonary/Chest: Effort normal and breath sounds normal.   Abdominal: Soft. Bowel sounds are normal.   Neurological: She is alert and oriented to person, place, and time.   Skin: Skin is warm and dry. She is not  diaphoretic.     Psychiatric: Affect and judgment normal.   Musculoskeletal: Normal range of motion. She exhibits deformity. She exhibits no edema.   No synovitis in small joints of hands and feet           Reviewed old and all outside pertinent medical records available.    All lab results personally reviewed and interpreted by me.  Lab Results   Component Value Date    WBC 5.86 04/22/2019    HGB 13.6 04/22/2019    HCT 40.2 04/22/2019    MCV 87 04/22/2019    MCH 29.3 04/22/2019    MCHC 33.8 04/22/2019    RDW 12.6 04/22/2019     04/22/2019    MPV 10.9 04/22/2019       Lab Results   Component Value Date     04/22/2019    K 4.1 04/22/2019     04/22/2019    CO2 27 04/22/2019    GLU 91 04/22/2019    BUN 18 04/22/2019    CALCIUM 11.4 (H) 04/22/2019    PROT 7.5 04/22/2019    ALBUMIN 4.0 04/22/2019    BILITOT 1.6 (H) 04/22/2019    AST 17 04/22/2019    ALKPHOS 80 04/22/2019    ALT 18 04/22/2019       Lab Results   Component Value Date    COLORU YELLOW 08/31/2011    APPEARANCEUA CLEAR 08/31/2011    SPECGRAV 1.017 08/31/2011    PHUR 5.0 08/31/2011    PROTEINUA Negative 08/31/2011    KETONESU Negative 08/31/2011    LEUKOCYTESUR Negative 08/31/2011    NITRITE Negative 08/31/2011    UROBILINOGEN 0.2 08/31/2011       No results found for: CRP    No results found for: SEDRATE, ERYTHROCYTES    No results found for: AUDREY, RF, SEDRATE    No components found for: 25OHVITDTOT, 75NOKCAD7, 37NIUZFI1, METHODNOTE    No results found for: URICACID    No components found for: TSPOTTB      Imaging:  All imaging reviewed and independently  interpreted by me.    Ankle XR  Findings: No acute fractures or dislocations visualized. There is severe asymmetric joint space narrowing along the lateral aspect of the tibiotalar articulation with associated degenerative subarticular sclerosis and cystic changes.  There is also degenerative spurring at the distal tips of the medial and lateral malleoli. There is suggestion of a probable  ankle joint effusion.     ASSESSMENT / PLAN:     Joyce Marino is a 73 y.o. White female with:      1. Osteoarthritis, unspecified osteoarthritis type, unspecified site  -severe OA in ankle  -refer to ortho for eval further intervention    - Ambulatory Referral to Orthopedics        2. Osteoporosis, unspecified osteoporosis type, unspecified pathological fracture presence  -ca/cr reviewed, elevated ca- ok to give prolia  -previously unable to tolerate fosamax, will start prolia #1     - Prior Authorization Order  - Vitamin D; Future        Follow up in about 6 months (around 10/29/2019), or if symptoms worsen or fail to improve.    Method of contact with patient concerns: Marilynn attn Rheumatology      Yoon Pennington M.D.  Rheumatology Department   [facility] [place] [address], [place] [address]  Phone: [phone]  Fax: [phone]

## 2019-04-29 NOTE — PATIENT INSTRUCTIONS
Denosumab injection  What is this medicine?  DENOSUMAB (den oh mary mab) slows bone breakdown. Prolia is used to treat osteoporosis in women after menopause and in men. Xgeva is used to prevent bone fractures and other bone problems caused by cancer bone metastases. Xgeva is also used to treat giant cell tumor of the bone.  How should I use this medicine?  This medicine is for injection under the skin. It is given by a health care professional in a hospital or clinic setting.  If you are getting Prolia, a special MedGuide will be given to you by the pharmacist with each prescription and refill. Be sure to read this information carefully each time.  For Prolia, talk to your pediatrician regarding the use of this medicine in children. Special care may be needed. For Xgeva, talk to your pediatrician regarding the use of this medicine in children. While this drug may be prescribed for children as young as 13 years for selected conditions, precautions do apply.  What side effects may I notice from receiving this medicine?  Side effects that you should report to your doctor or health care professional as soon as possible:  · allergic reactions like skin rash, itching or hives, swelling of the face, lips, or tongue  · breathing problems  · chest pain  · fast, irregular heartbeat  · feeling faint or lightheaded, falls  · fever, chills, or any other sign of infection  · muscle spasms, tightening, or twitches  · numbness or tingling  · skin blisters or bumps, or is dry, peels, or red  · slow healing or unexplained pain in the mouth or jaw  · unusual bleeding or bruising  Side effects that usually do not require medical attention (Report these to your doctor or health care professional if they continue or are bothersome.):  · muscle pain  · stomach upset, gas  What may interact with this medicine?  Do not take this medicine with any of the following medications:  · other medicines containing denosumab  This medicine may also  interact with the following medications:  · medicines that suppress the immune system  · medicines that treat cancer  · steroid medicines like prednisone or cortisone  What if I miss a dose?  It is important not to miss your dose. Call your doctor or health care professional if you are unable to keep an appointment.  Where should I keep my medicine?  This medicine is only given in a clinic, doctor's office, or other health care setting and will not be stored at home.  What should I tell my health care provider before I take this medicine?  They need to know if you have any of these conditions:  · dental disease  · eczema  · infection or history of infections  · kidney disease or on dialysis  · low blood calcium or vitamin D  · malabsorption syndrome  · scheduled to have surgery or tooth extraction  · taking medicine that contains denosumab  · thyroid or parathyroid disease  · an unusual reaction to denosumab, other medicines, foods, dyes, or preservatives  · pregnant or trying to get pregnant  · breast-feeding  What should I watch for while using this medicine?  Visit your doctor or health care professional for regular checks on your progress. Your doctor or health care professional may order blood tests and other tests to see how you are doing.  Call your doctor or health care professional if you get a cold or other infection while receiving this medicine. Do not treat yourself. This medicine may decrease your body's ability to fight infection.  You should make sure you get enough calcium and vitamin D while you are taking this medicine, unless your doctor tells you not to. Discuss the foods you eat and the vitamins you take with your health care professional.  See your dentist regularly. Brush and floss your teeth as directed. Before you have any dental work done, tell your dentist you are receiving this medicine.  Do not become pregnant while taking this medicine or for 5 months after stopping it. Women should  inform their doctor if they wish to become pregnant or think they might be pregnant. There is a potential for serious side effects to an unborn child. Talk to your health care professional or pharmacist for more information.  NOTE:This sheet is a summary. It may not cover all possible information. If you have questions about this medicine, talk to your doctor, pharmacist, or health care provider. Copyright© 2017 [employer]

## 2019-04-29 NOTE — PROGRESS NOTES
Administered 1cc Prolia 60mg/cc to right upper outer quad of abdomen. Pt tolerated well. No acute reaction noted at site. Pt instructed on S/S of reaction to report. Pt verbalized understanding. Patient waited 15 minutes post injection    Lot:[id number]  Exp.06/21  Manu:Feng    Calcium: 11.4  Creatinine: 0.6

## 2019-04-29 NOTE — LETTER
April 29, 2019      Porter Vasques MD  [id number] [address] [address]           [facility]  [address] [place] [address] Dr Dajuan CRONIN [id number]  Phone: [phone]  Fax: [phone]          Patient: Joyce Marino   MR Number: [id number]   YOB: 1945   Date of Visit: 4/29/2019       Dear Dr. Porter Vasques:    Thank you for referring Joyce Marino to me for evaluation. Attached you will find relevant portions of my assessment and plan of care.    If you have questions, please do not hesitate to call me. I look forward to following Joyce Marino along with you.    Sincerely,    Yoon Pennington MD    Enclosure  CC:  No Recipients    If you would like to receive this communication electronically, please contact [email or web address] or [phone] to request more information on [facility] access.    For providers and/or their staff who would like to refer a patient to [facility], please contact us through our one-stop-shop provider referral line, [facility], at [phone].    If you feel you have received this communication in error or would no longer like to receive these types of communications, please e-mail [email or web address]

## 2019-04-30 ENCOUNTER — TELEPHONE (OUTPATIENT)
Dept: ORTHOPEDICS | Facility: CLINIC | Age: 74
End: 2019-04-30

## 2019-04-30 ENCOUNTER — TELEPHONE (OUTPATIENT)
Dept: PODIATRY | Facility: CLINIC | Age: 74
End: 2019-04-30

## 2019-04-30 NOTE — TELEPHONE ENCOUNTER
Please advise...left message requesting pt to call back to schedule pt for an appointment with Dr. Appiah/Podiatry    Anjelica Bean MA  Podiatry Surgical Department

## 2019-05-01 ENCOUNTER — TELEPHONE (OUTPATIENT)
Dept: PODIATRY | Facility: CLINIC | Age: 74
End: 2019-05-01

## 2019-05-01 ENCOUNTER — PATIENT MESSAGE (OUTPATIENT)
Dept: RHEUMATOLOGY | Facility: CLINIC | Age: 74
End: 2019-05-01

## 2019-05-09 ENCOUNTER — APPOINTMENT (OUTPATIENT)
Dept: LAB | Facility: HOSPITAL | Age: 74
End: 2019-05-09
Attending: FAMILY MEDICINE
Payer: MEDICARE

## 2019-05-13 ENCOUNTER — PATIENT MESSAGE (OUTPATIENT)
Dept: FAMILY MEDICINE | Facility: CLINIC | Age: 74
End: 2019-05-13

## 2019-06-07 ENCOUNTER — OFFICE VISIT (OUTPATIENT)
Dept: CARDIOLOGY | Facility: CLINIC | Age: 74
End: 2019-06-07
Payer: MEDICARE

## 2019-06-07 ENCOUNTER — CLINICAL SUPPORT (OUTPATIENT)
Dept: CARDIOLOGY | Facility: CLINIC | Age: 74
End: 2019-06-07
Payer: MEDICARE

## 2019-06-07 VITALS
HEIGHT: 66 IN | WEIGHT: 147.25 LBS | HEART RATE: 69 BPM | BODY MASS INDEX: 23.66 KG/M2 | SYSTOLIC BLOOD PRESSURE: 128 MMHG | DIASTOLIC BLOOD PRESSURE: 76 MMHG

## 2019-06-07 DIAGNOSIS — I35.0 NONRHEUMATIC AORTIC VALVE STENOSIS: ICD-10-CM

## 2019-06-07 DIAGNOSIS — I35.1 NONRHEUMATIC AORTIC VALVE INSUFFICIENCY: ICD-10-CM

## 2019-06-07 DIAGNOSIS — I10 ESSENTIAL HYPERTENSION: ICD-10-CM

## 2019-06-07 DIAGNOSIS — I10 ESSENTIAL HYPERTENSION: Primary | ICD-10-CM

## 2019-06-07 DIAGNOSIS — E78.2 MIXED HYPERLIPIDEMIA: ICD-10-CM

## 2019-06-07 DIAGNOSIS — R07.89 ATYPICAL CHEST PAIN: ICD-10-CM

## 2019-06-07 DIAGNOSIS — Z78.9 STATIN INTOLERANCE: ICD-10-CM

## 2019-06-07 PROCEDURE — 93005 ELECTROCARDIOGRAM TRACING: CPT | Mod: PBBFAC | Performed by: NUCLEAR MEDICINE

## 2019-06-07 PROCEDURE — 93010 EKG 12-LEAD: ICD-10-PCS | Mod: S$PBB,,, | Performed by: NUCLEAR MEDICINE

## 2019-06-07 PROCEDURE — 93010 ELECTROCARDIOGRAM REPORT: CPT | Mod: S$PBB,,, | Performed by: NUCLEAR MEDICINE

## 2019-06-07 PROCEDURE — 99214 PR OFFICE/OUTPT VISIT, EST, LEVL IV, 30-39 MIN: ICD-10-PCS | Mod: S$PBB,,, | Performed by: INTERNAL MEDICINE

## 2019-06-07 PROCEDURE — 99999 PR PBB SHADOW E&M-EST. PATIENT-LVL III: ICD-10-PCS | Mod: PBBFAC,,, | Performed by: INTERNAL MEDICINE

## 2019-06-07 PROCEDURE — 99213 OFFICE O/P EST LOW 20 MIN: CPT | Mod: PBBFAC | Performed by: INTERNAL MEDICINE

## 2019-06-07 PROCEDURE — 99999 PR PBB SHADOW E&M-EST. PATIENT-LVL III: CPT | Mod: PBBFAC,,, | Performed by: INTERNAL MEDICINE

## 2019-06-07 PROCEDURE — 99214 OFFICE O/P EST MOD 30 MIN: CPT | Mod: S$PBB,,, | Performed by: INTERNAL MEDICINE

## 2019-06-07 NOTE — PROGRESS NOTES
Subjective:    Patient ID:  Joyce Marino is a 73 y.o. female who presents for evaluation of Hypertension; Hyperlipidemia; Valvular Heart Disease; and Risk Factor Management For Atherosclerosis      HPI Pt presents for yearly f/u.  She has HTN, hyperlipidemia, AS, AI, 1 av block. Nonsmoker.  - stress MPI 5/18.  Echo 5/18 normal EF, DD, moderate AS.  Last echo 2015 showed normal EF, mod AS, mild AI.  She has h/o atypical cp sxs.  Lipids 4/19 elevated, above goal.  Has statin intolerance.  Cp sxs are not active, and she thinks it was from Fosamax.  No exertional cp.  No CHF sxs.  No dizziness or syncope.  HTN well controlled on current meds.  Compliant with meds.  Vegetarian.  ecg today is reviewed and is normal.      Current Outpatient Medications:     ferrous sulfate 325 mg (65 mg iron) Tab tablet, Take 1 tablet (325 mg total) by mouth daily with breakfast., Disp: 60 tablet, Rfl: 0    glucosamine HCl-msm-chondroitn 750-375-400 mg Tab, Take 1 tablet by mouth 2 (two) times daily., Disp: , Rfl:     KLOR-CON M20 20 mEq tablet, TAKE ONE TABLET BY MOUTH ONCE DAILY, Disp: 30 tablet, Rfl: 4    Lactobacillus acidophilus (PROBIOTIC) 10 billion cell Cap, Take 1 capsule by mouth once daily., Disp: , Rfl:     losartan-hydrochlorothiazide 50-12.5 mg (HYZAAR) 50-12.5 mg per tablet, TAKE ONE TABLET BY MOUTH ONCE DAILY, Disp: 90 tablet, Rfl: 3    multivitamin with minerals tablet, Take 1 tablet by mouth once daily., Disp: , Rfl:     traMADol (ULTRAM) 50 mg tablet, Take 1 tablet (50 mg total) by mouth every 24 hours as needed for Pain., Disp: 30 tablet, Rfl: 3    turmeric root extract 500 mg Cap, Take 1 capsule by mouth 2 (two) times daily., Disp: , Rfl:     Current Facility-Administered Medications:     denosumab (PROLIA) injection 60 mg, 60 mg, Subcutaneous, Q6 Months, Yoon Pennington MD, 60 mg at 04/29/19 0955      Review of Systems   Constitution: Negative.   HENT: Negative.    Eyes: Negative.    Cardiovascular:  "Negative.    Respiratory: Negative.    Endocrine: Negative.    Hematologic/Lymphatic: Negative.    Skin: Negative.    Musculoskeletal: Negative.    Gastrointestinal: Negative.    Genitourinary: Negative.    Neurological: Negative.    Psychiatric/Behavioral: Negative.    Allergic/Immunologic: Negative.        /76 (BP Location: Right arm, Patient Position: Sitting, BP Method: Medium (Manual))   Pulse 69   Ht 5' 6" (1.676 m)   Wt 66.8 kg (147 lb 4.3 oz)   BMI 23.77 kg/m²     Wt Readings from Last 3 Encounters:   06/07/19 66.8 kg (147 lb 4.3 oz)   04/29/19 68.2 kg (150 lb 5.7 oz)   04/22/19 68 kg (149 lb 14.6 oz)     Temp Readings from Last 3 Encounters:   04/22/19 96.9 °F (36.1 °C) (Tympanic)   10/18/18 96 °F (35.6 °C) (Tympanic)   04/18/18 97.4 °F (36.3 °C) (Tympanic)     BP Readings from Last 3 Encounters:   06/07/19 128/76   04/29/19 132/68   04/22/19 136/80     Pulse Readings from Last 3 Encounters:   06/07/19 69   04/29/19 76   04/22/19 69          Objective:    Physical Exam   Constitutional: She is oriented to person, place, and time. Vital signs are normal. She appears well-developed and well-nourished. She is active and cooperative. She does not have a sickly appearance. She does not appear ill. No distress.   HENT:   Head: Normocephalic.   Neck: Neck supple. Normal carotid pulses, no hepatojugular reflux and no JVD present. Carotid bruit is not present. No thyromegaly present.   Cardiovascular: Normal rate, regular rhythm, S1 normal, S2 normal and normal pulses. PMI is not displaced. Exam reveals no gallop and no friction rub.   Murmur heard.   Harsh midsystolic murmur is present with a grade of 2/6 at the upper right sternal border radiating to the neck.  Pulses:       Radial pulses are 2+ on the right side, and 2+ on the left side.   Pulmonary/Chest: Effort normal and breath sounds normal. She has no wheezes. She has no rales.   Abdominal: Soft. Normal appearance, normal aorta and bowel sounds are " normal. She exhibits no pulsatile liver, no abdominal bruit, no ascites and no mass. There is no splenomegaly or hepatomegaly. There is no tenderness.   Musculoskeletal: She exhibits no edema.   Lymphadenopathy:     She has no cervical adenopathy.   Neurological: She is alert and oriented to person, place, and time.   Skin: Skin is warm. She is not diaphoretic.   Psychiatric: She has a normal mood and affect. Her behavior is normal.   Nursing note and vitals reviewed.      I have reviewed all pertinent labs and cardiac studies.      Chemistry        Component Value Date/Time     04/22/2019 0935    K 4.1 04/22/2019 0935     04/22/2019 0935    CO2 27 04/22/2019 0935    BUN 18 04/22/2019 0935    CREATININE 0.6 04/22/2019 0935    GLU 91 04/22/2019 0935        Component Value Date/Time    CALCIUM 11.4 (H) 04/22/2019 0935    ALKPHOS 80 04/22/2019 0935    AST 17 04/22/2019 0935    ALT 18 04/22/2019 0935    BILITOT 1.6 (H) 04/22/2019 0935    ESTGFRAFRICA >60.0 04/22/2019 0935    EGFRNONAA >60.0 04/22/2019 0935        Lab Results   Component Value Date    WBC 5.86 04/22/2019    HGB 13.6 04/22/2019    HCT 40.2 04/22/2019    MCV 87 04/22/2019     04/22/2019     Lab Results   Component Value Date    HGBA1C 5.4 09/27/2017     Lab Results   Component Value Date    CHOL 251 (H) 04/22/2019    CHOL 281 (H) 10/18/2018    CHOL 231 (H) 09/27/2017     Lab Results   Component Value Date    HDL 69 04/22/2019    HDL 67 10/18/2018    HDL 66 09/27/2017     Lab Results   Component Value Date    LDLCALC 155.2 04/22/2019    LDLCALC 176.0 (H) 10/18/2018    LDLCALC 134.6 09/27/2017     Lab Results   Component Value Date    TRIG 134 04/22/2019    TRIG 190 (H) 10/18/2018    TRIG 152 (H) 09/27/2017     Lab Results   Component Value Date    CHOLHDL 27.5 04/22/2019    CHOLHDL 23.8 10/18/2018    CHOLHDL 28.6 09/27/2017           Assessment:       1. Essential hypertension    2. Mixed hyperlipidemia    3. Statin intolerance    4.  Nonrheumatic aortic valve stenosis    5. Nonrheumatic aortic valve insufficiency    6. Atypical chest pain         Plan:             Stable CV conditions.  Discussed aortic stenosis condition and future mgt involving AVR at some point when severe/symptomatic.  TAVR discussed.  At present time is moderately abnormal, observation.  Echo in one year.  Discussed her lipid status.  Discussed other non-statin tx options involving Zetia, Repatha/Praluent, all indications, risks/benefits, side effects discussed.  She wants to continue on nonpharmacologic therapy for her lipids with diet, exercise.  Continue current meds.  Cardiac diet discussed.  Daily exercise -- goal 30 + minutes.  F/u 1 year with echo.

## 2019-06-10 DIAGNOSIS — I10 ESSENTIAL HYPERTENSION: Primary | ICD-10-CM

## 2019-07-05 ENCOUNTER — TELEPHONE (OUTPATIENT)
Dept: FAMILY MEDICINE | Facility: CLINIC | Age: 74
End: 2019-07-05

## 2019-07-05 NOTE — TELEPHONE ENCOUNTER
----- Message from Kandace Hathaway sent at 7/5/2019 12:46 PM CDT -----  Contact: self [phone]  States that the medication losartan 12.5mg is on backorder and needs to know how to get the rx filled. States that she has 1 pill left. Please call back at [phone]//thank you acc

## 2019-07-05 NOTE — TELEPHONE ENCOUNTER
Patient called but no answer. Medication was called into the [facility] in [place] for her to fill there.

## 2019-07-22 RX ORDER — HYDROCHLOROTHIAZIDE 12.5 MG/1
12.5 TABLET ORAL DAILY
Qty: 90 TABLET | Refills: 3 | Status: SHIPPED | OUTPATIENT
Start: 2019-07-22 | End: 2019-08-27

## 2019-07-22 RX ORDER — LOSARTAN POTASSIUM 50 MG/1
50 TABLET ORAL DAILY
Qty: 90 TABLET | Refills: 1 | Status: SHIPPED | OUTPATIENT
Start: 2019-07-22 | End: 2019-08-27

## 2019-07-22 NOTE — TELEPHONE ENCOUNTER
Received fax from [facility] that losartan/HCT 50/12.5mg is on backorder and is wanting to know if they may separate the medication to fill it?

## 2019-08-07 ENCOUNTER — TELEPHONE (OUTPATIENT)
Dept: FAMILY MEDICINE | Facility: CLINIC | Age: 74
End: 2019-08-07

## 2019-08-07 NOTE — TELEPHONE ENCOUNTER
Attempted to call patient, there was no answer, message left to call the clinic back at their convenience.    I need to know which pharmacy to send the 14 day supply into, message left

## 2019-08-07 NOTE — TELEPHONE ENCOUNTER
----- Message from Ashley Mcgraw sent at 8/7/2019  3:20 PM CDT -----  Contact: Pt  Pt called in regards to the losartan (COZAAR) 50 MG tablet    Both [facility]  don't have the medication that was prescribed.   Pt stated all pharmacy's told her they were on back order. Pt can be reached at [phone] (zyfn)

## 2019-08-07 NOTE — TELEPHONE ENCOUNTER
----- Message from Sravanthi Echevarria sent at 8/7/2019  2:03 PM CDT -----  Contact: Pt   Pt is calling regarding requesting to have nurse call back. Pt states that call is concerning mediation   losartan (COZAAR) 50 MG tablet being out of stock and on backed [facility]. Pt states  that she is just needing a 14 days supplies and after that will be switching  [facility]. .[phone] (home)         .Thank You  Marina Echevarria

## 2019-08-08 ENCOUNTER — TELEPHONE (OUTPATIENT)
Dept: FAMILY MEDICINE | Facility: CLINIC | Age: 74
End: 2019-08-08

## 2019-08-08 NOTE — TELEPHONE ENCOUNTER
----- Message from Margaret Andersen sent at 8/8/2019  8:20 AM CDT -----  Type:  RX Refill Request    Who Called: pt   Refill or New Rx: refill   RX Name and Strength: losartain 50-12.5mg   How is the patient currently taking it? (ex. 1XDay):once daily   Is this a 30 day or 90 day RX: 14 days  Preferred Pharmacy with phone number:   Local or Mail Order:  Ordering Provider: alysia  Would the patient rather a call back or a response via [facility]? Phone   Best Call Back Number:[phone]  Additional Information: wants to have the nurse locate a pharm that has the medicine in stock and call her with the name of the pharm

## 2019-08-27 RX ORDER — LOSARTAN POTASSIUM AND HYDROCHLOROTHIAZIDE 12.5; 5 MG/1; MG/1
1 TABLET ORAL DAILY
Qty: 90 TABLET | Refills: 3 | Status: SHIPPED | OUTPATIENT
Start: 2019-08-27 | End: 2020-08-10 | Stop reason: RX

## 2019-08-27 NOTE — TELEPHONE ENCOUNTER
Received refill request from [facility] for the Losartan/HCTZ 50/12.5. Patient ask that we send it to them instead of the medication seperated(Losartan and HCTZ) to [facility].

## 2019-10-22 ENCOUNTER — OFFICE VISIT (OUTPATIENT)
Dept: FAMILY MEDICINE | Facility: CLINIC | Age: 74
End: 2019-10-22
Payer: MEDICARE

## 2019-10-22 ENCOUNTER — LAB VISIT (OUTPATIENT)
Dept: LAB | Facility: HOSPITAL | Age: 74
End: 2019-10-22
Attending: FAMILY MEDICINE
Payer: MEDICARE

## 2019-10-22 ENCOUNTER — TELEPHONE (OUTPATIENT)
Dept: PHARMACY | Facility: CLINIC | Age: 74
End: 2019-10-22

## 2019-10-22 VITALS
DIASTOLIC BLOOD PRESSURE: 80 MMHG | OXYGEN SATURATION: 97 % | TEMPERATURE: 98 F | SYSTOLIC BLOOD PRESSURE: 130 MMHG | HEART RATE: 67 BPM | WEIGHT: 146.19 LBS | BODY MASS INDEX: 23.49 KG/M2 | HEIGHT: 66 IN

## 2019-10-22 DIAGNOSIS — Z78.9 STATIN INTOLERANCE: ICD-10-CM

## 2019-10-22 DIAGNOSIS — M79.10 MYALGIA DUE TO HMG COA REDUCTASE INHIBITOR: ICD-10-CM

## 2019-10-22 DIAGNOSIS — Z00.00 WELL ADULT EXAM: Primary | ICD-10-CM

## 2019-10-22 DIAGNOSIS — E83.52 HYPERCALCEMIA: ICD-10-CM

## 2019-10-22 DIAGNOSIS — M19.90 OSTEOARTHRITIS, UNSPECIFIED OSTEOARTHRITIS TYPE, UNSPECIFIED SITE: ICD-10-CM

## 2019-10-22 DIAGNOSIS — I10 ESSENTIAL HYPERTENSION: ICD-10-CM

## 2019-10-22 DIAGNOSIS — Z00.00 WELL ADULT EXAM: ICD-10-CM

## 2019-10-22 DIAGNOSIS — M81.0 OSTEOPOROSIS, POST-MENOPAUSAL: ICD-10-CM

## 2019-10-22 DIAGNOSIS — T46.6X5A MYALGIA DUE TO HMG COA REDUCTASE INHIBITOR: ICD-10-CM

## 2019-10-22 DIAGNOSIS — Z12.39 BREAST CANCER SCREENING: ICD-10-CM

## 2019-10-22 LAB
ALBUMIN SERPL BCP-MCNC: 4 G/DL (ref 3.5–5.2)
ALP SERPL-CCNC: 61 U/L (ref 55–135)
ALT SERPL W/O P-5'-P-CCNC: 13 U/L (ref 10–44)
ANION GAP SERPL CALC-SCNC: 9 MMOL/L (ref 8–16)
AST SERPL-CCNC: 17 U/L (ref 10–40)
BASOPHILS # BLD AUTO: 0.07 K/UL (ref 0–0.2)
BASOPHILS NFR BLD: 1.2 % (ref 0–1.9)
BILIRUB SERPL-MCNC: 0.9 MG/DL (ref 0.1–1)
BUN SERPL-MCNC: 16 MG/DL (ref 8–23)
CALCIUM SERPL-MCNC: 10.5 MG/DL (ref 8.7–10.5)
CHLORIDE SERPL-SCNC: 105 MMOL/L (ref 95–110)
CHOLEST SERPL-MCNC: 260 MG/DL (ref 120–199)
CHOLEST/HDLC SERPL: 3.8 {RATIO} (ref 2–5)
CO2 SERPL-SCNC: 26 MMOL/L (ref 23–29)
CREAT SERPL-MCNC: 0.6 MG/DL (ref 0.5–1.4)
DIFFERENTIAL METHOD: ABNORMAL
EOSINOPHIL # BLD AUTO: 0.4 K/UL (ref 0–0.5)
EOSINOPHIL NFR BLD: 7.2 % (ref 0–8)
ERYTHROCYTE [DISTWIDTH] IN BLOOD BY AUTOMATED COUNT: 16.3 % (ref 11.5–14.5)
EST. GFR  (AFRICAN AMERICAN): >60 ML/MIN/1.73 M^2
EST. GFR  (NON AFRICAN AMERICAN): >60 ML/MIN/1.73 M^2
GLUCOSE SERPL-MCNC: 92 MG/DL (ref 70–110)
HCT VFR BLD AUTO: 39.8 % (ref 37–48.5)
HDLC SERPL-MCNC: 69 MG/DL (ref 40–75)
HDLC SERPL: 26.5 % (ref 20–50)
HGB BLD-MCNC: 12.2 G/DL (ref 12–16)
IMM GRANULOCYTES # BLD AUTO: 0.01 K/UL (ref 0–0.04)
IMM GRANULOCYTES NFR BLD AUTO: 0.2 % (ref 0–0.5)
LDLC SERPL CALC-MCNC: 164.6 MG/DL (ref 63–159)
LYMPHOCYTES # BLD AUTO: 1.5 K/UL (ref 1–4.8)
LYMPHOCYTES NFR BLD: 25.9 % (ref 18–48)
MCH RBC QN AUTO: 25.1 PG (ref 27–31)
MCHC RBC AUTO-ENTMCNC: 30.7 G/DL (ref 32–36)
MCV RBC AUTO: 82 FL (ref 82–98)
MONOCYTES # BLD AUTO: 0.6 K/UL (ref 0.3–1)
MONOCYTES NFR BLD: 10 % (ref 4–15)
NEUTROPHILS # BLD AUTO: 3.2 K/UL (ref 1.8–7.7)
NEUTROPHILS NFR BLD: 55.5 % (ref 38–73)
NONHDLC SERPL-MCNC: 191 MG/DL
NRBC BLD-RTO: 0 /100 WBC
PLATELET # BLD AUTO: 287 K/UL (ref 150–350)
PMV BLD AUTO: 10.8 FL (ref 9.2–12.9)
POTASSIUM SERPL-SCNC: 4.2 MMOL/L (ref 3.5–5.1)
PROT SERPL-MCNC: 7.4 G/DL (ref 6–8.4)
PTH-INTACT SERPL-MCNC: 112 PG/ML (ref 9–77)
RBC # BLD AUTO: 4.86 M/UL (ref 4–5.4)
SODIUM SERPL-SCNC: 140 MMOL/L (ref 136–145)
TRIGL SERPL-MCNC: 132 MG/DL (ref 30–150)
WBC # BLD AUTO: 5.72 K/UL (ref 3.9–12.7)

## 2019-10-22 PROCEDURE — 80053 COMPREHEN METABOLIC PANEL: CPT

## 2019-10-22 PROCEDURE — 99999 PR PBB SHADOW E&M-EST. PATIENT-LVL III: ICD-10-PCS | Mod: PBBFAC,,, | Performed by: FAMILY MEDICINE

## 2019-10-22 PROCEDURE — 99214 OFFICE O/P EST MOD 30 MIN: CPT | Mod: S$PBB,,, | Performed by: FAMILY MEDICINE

## 2019-10-22 PROCEDURE — 36415 COLL VENOUS BLD VENIPUNCTURE: CPT | Mod: PO

## 2019-10-22 PROCEDURE — 82330 ASSAY OF CALCIUM: CPT

## 2019-10-22 PROCEDURE — 80061 LIPID PANEL: CPT

## 2019-10-22 PROCEDURE — 85025 COMPLETE CBC W/AUTO DIFF WBC: CPT

## 2019-10-22 PROCEDURE — 99213 OFFICE O/P EST LOW 20 MIN: CPT | Mod: PBBFAC,PO | Performed by: FAMILY MEDICINE

## 2019-10-22 PROCEDURE — 99999 PR PBB SHADOW E&M-EST. PATIENT-LVL III: CPT | Mod: PBBFAC,,, | Performed by: FAMILY MEDICINE

## 2019-10-22 PROCEDURE — 83970 ASSAY OF PARATHORMONE: CPT

## 2019-10-22 PROCEDURE — 99214 PR OFFICE/OUTPT VISIT, EST, LEVL IV, 30-39 MIN: ICD-10-PCS | Mod: S$PBB,,, | Performed by: FAMILY MEDICINE

## 2019-10-22 RX ORDER — MELOXICAM 15 MG/1
15 TABLET ORAL DAILY
COMMUNITY
End: 2019-10-22 | Stop reason: SDUPTHER

## 2019-10-22 RX ORDER — MELOXICAM 15 MG/1
15 TABLET ORAL DAILY
Qty: 90 TABLET | Refills: 1 | Status: SHIPPED | OUTPATIENT
Start: 2019-10-22 | End: 2020-03-27

## 2019-10-22 NOTE — PROGRESS NOTES
Chief Complaint:    Chief Complaint   Patient presents with    Follow-up       History of Present Illness:  Presents today for six-month follow-up:  Blood pressure is okay today  Following with Rheumatology for osteoporosis treatment   Also has significant osteoarthrosis.  Patient's labs reveals hypercalcemia last time she was asked to come back for follow-up lab for she did not do    She has hyperlipidemia with a 10 year risk of heart disease at 16% has had significant intolerance to statin causing muscle pain so she does not want to take any more statins.  But is willing to try the injectable meds like Repatha      ROS:  Review of Systems   Constitutional: Negative for activity change, chills, fatigue, fever and unexpected weight change.   HENT: Negative for congestion, ear discharge, ear pain, hearing loss, postnasal drip and rhinorrhea.    Eyes: Negative for pain and visual disturbance.   Respiratory: Negative for cough, chest tightness and shortness of breath.    Cardiovascular: Negative for chest pain and palpitations.   Gastrointestinal: Negative for abdominal pain, diarrhea and vomiting.   Endocrine: Negative for heat intolerance.   Genitourinary: Negative for dysuria, flank pain, frequency and hematuria.   Musculoskeletal: Negative for back pain, gait problem and neck pain.   Skin: Negative for color change and rash.   Neurological: Negative for dizziness, tremors, seizures, numbness and headaches.   Psychiatric/Behavioral: Negative for agitation, hallucinations, self-injury, sleep disturbance and suicidal ideas. The patient is not nervous/anxious.        Past Medical History:   Diagnosis Date    Arthritis     Asthma     Hyperlipidemia     Hypertension        Social History:  Social History     Socioeconomic History    Marital status: Single     Spouse name: Not on file    Number of children: Not on file    Years of education: Not on file    Highest education level: Not on file   Occupational  "History    Not on file   Social Needs    Financial resource strain: Not on file    Food insecurity:     Worry: Not on file     Inability: Not on file    Transportation needs:     Medical: Not on file     Non-medical: Not on file   Tobacco Use    Smoking status: Never Smoker    Smokeless tobacco: Never Used   Substance and Sexual Activity    Alcohol use: No    Drug use: No    Sexual activity: Never     Partners: Male     Birth control/protection: None   Lifestyle    Physical activity:     Days per week: Not on file     Minutes per session: Not on file    Stress: Not on file   Relationships    Social connections:     Talks on phone: Not on file     Gets together: Not on file     Attends Restorationism service: Not on file     Active member of club or organization: Not on file     Attends meetings of clubs or organizations: Not on file     Relationship status: Not on file   Other Topics Concern    Not on file   Social History Narrative    Not on file       Family History:   family history includes Emphysema in her mother; Heart disease in her father and mother; Lung cancer in her maternal grandfather.    Health Maintenance   Topic Date Due    DEXA SCAN  10/06/2019    Mammogram  11/23/2019    Lipid Panel  04/22/2020    Fecal Occult Blood Test (FOBT)/[employer]Kit  05/09/2020    TETANUS VACCINE  04/04/2024    Colonoscopy  09/19/2024    Hepatitis C Screening  Completed    Pneumococcal Vaccine (65+ Low/Medium Risk)  Completed       Physical Exam:    Vital Signs  Temp: 97.7 °F (36.5 °C)  Temp src: Temporal  Pulse: 67  SpO2: 97 %  BP: 130/80  BP Location: Left arm  Patient Position: Sitting  Pain Score: 0-No pain  Height and Weight  Height: 5' 6" (167.6 cm)  Weight: 66.3 kg (146 lb 2.6 oz)  BSA (Calculated - sq m): 1.76 sq meters  BMI (Calculated): 23.6  Weight in (lb) to have BMI = 25: 154.6]    Body mass index is 23.59 kg/m².    Physical Exam   Constitutional: She is oriented to person, place, and time. She " appears well-developed.   HENT:   Mouth/Throat: Oropharynx is clear and moist.   Eyes: Pupils are equal, round, and reactive to light. Conjunctivae are normal.   Neck: Normal range of motion. Neck supple.   Cardiovascular: Normal rate and regular rhythm.   Murmur heard.   Systolic murmur is present with a grade of 3/6.  Pulmonary/Chest: Effort normal and breath sounds normal. No respiratory distress. She has no wheezes. She has no rales. She exhibits no tenderness.   Abdominal: Soft. She exhibits no distension and no mass. There is no tenderness. There is no guarding.   Musculoskeletal: She exhibits no edema or tenderness.   Lymphadenopathy:     She has no cervical adenopathy.   Neurological: She is alert and oriented to person, place, and time. She has normal reflexes.   Skin: Skin is warm and dry.   Psychiatric: She has a normal mood and affect. Her behavior is normal. Judgment and thought content normal.         Assessment:      ICD-10-CM ICD-9-CM   1. Well adult exam Z00.00 V70.0   2. Hypercalcemia E83.52 275.42   3. Essential hypertension I10 401.9   4. Osteoporosis, post-menopausal M81.0 733.01   5. Osteoarthritis, unspecified osteoarthritis type, unspecified site M19.90 715.90   6. Myalgia due to HMG CoA reductase inhibitor M79.10 729.1    T46.6X5A E942.2   7. Statin intolerance Z78.9 995.27   8. Breast cancer screening Z12.39 V76.10         Plan:    Given her intolerance to statins will see if her insurance will authorize for Repatha  Check a PTH and ionized calcium  Other medical problems are stable continue current meds and plan  Schedule a screening mammogram  Patient wants to start coming once a year  Orders Placed This Encounter   Procedures    Mammo Digital Screening Bilat    CBC auto differential    Comprehensive metabolic panel    Lipid panel    Calcium, ionized    PTH, intact       Current Outpatient Medications   Medication Sig Dispense Refill    ferrous sulfate 325 mg (65 mg iron) Tab  tablet Take 1 tablet (325 mg total) by mouth daily with breakfast. 60 tablet 0    glucosamine HCl-msm-chondroitn 750-375-400 mg Tab Take 1 tablet by mouth 2 (two) times daily.      KLOR-CON M20 20 mEq tablet TAKE ONE TABLET BY MOUTH ONCE DAILY 30 tablet 4    Lactobacillus acidophilus (PROBIOTIC) 10 billion cell Cap Take 1 capsule by mouth once daily.      losartan-hydrochlorothiazide 50-12.5 mg (HYZAAR) 50-12.5 mg per tablet Take 1 tablet by mouth once daily. 90 tablet 3    meloxicam (MOBIC) 15 MG tablet Take 1 tablet (15 mg total) by mouth once daily. 90 tablet 1    multivitamin with minerals tablet Take 1 tablet by mouth once daily.      turmeric root extract 500 mg Cap Take 1 capsule by mouth 2 (two) times daily.      alirocumab (PRALUENT PEN) 150 mg/mL PnIj Inject 1 mL (150 mg total) into the skin every 14 (fourteen) days. 2 Syringe 12     Current Facility-Administered Medications   Medication Dose Route Frequency Provider Last Rate Last Dose    denosumab (PROLIA) injection 60 mg  60 mg Subcutaneous Q6 Months Yoon Pennington MD   60 mg at 04/29/19 0955       Medications Discontinued During This Encounter   Medication Reason    traMADol (ULTRAM) 50 mg tablet Patient no longer taking    meloxicam (MOBIC) 15 MG tablet Reorder       Follow up in about 1 year (around 10/22/2020).      Porter Vasques MD

## 2019-10-22 NOTE — TELEPHONE ENCOUNTER
LVM for callback to inform patient that [facility] received prescription for Praluent and benefits investigation is required.  [facility] will be back in touch once insurance determination is received.

## 2019-10-23 LAB — CA-I BLDV-SCNC: 1.42 MMOL/L (ref 1.06–1.42)

## 2019-10-24 ENCOUNTER — TELEPHONE (OUTPATIENT)
Dept: FAMILY MEDICINE | Facility: CLINIC | Age: 74
End: 2019-10-24

## 2019-10-24 DIAGNOSIS — E21.3 HYPERPARATHYROIDISM: Primary | ICD-10-CM

## 2019-10-28 DIAGNOSIS — M81.0 OSTEOPOROSIS, UNSPECIFIED OSTEOPOROSIS TYPE, UNSPECIFIED PATHOLOGICAL FRACTURE PRESENCE: Primary | ICD-10-CM

## 2019-10-30 ENCOUNTER — PATIENT MESSAGE (OUTPATIENT)
Dept: FAMILY MEDICINE | Facility: CLINIC | Age: 74
End: 2019-10-30

## 2019-10-30 ENCOUNTER — TELEPHONE (OUTPATIENT)
Dept: FAMILY MEDICINE | Facility: CLINIC | Age: 74
End: 2019-10-30

## 2019-10-30 DIAGNOSIS — E21.3 HYPERPARATHYROIDISM: Primary | ICD-10-CM

## 2019-10-31 NOTE — TELEPHONE ENCOUNTER
Documentation only:    Prior authorization for Praluent has been approved for one year.    Approval dates:  10/31/2019 through 10/30/2020    Authorization number:  [id number]    Patient co-pay:  $35.00

## 2019-11-04 ENCOUNTER — OFFICE VISIT (OUTPATIENT)
Dept: RHEUMATOLOGY | Facility: CLINIC | Age: 74
End: 2019-11-04
Payer: MEDICARE

## 2019-11-04 VITALS
WEIGHT: 147.63 LBS | HEART RATE: 68 BPM | HEIGHT: 66 IN | BODY MASS INDEX: 23.72 KG/M2 | DIASTOLIC BLOOD PRESSURE: 72 MMHG | SYSTOLIC BLOOD PRESSURE: 140 MMHG

## 2019-11-04 DIAGNOSIS — M81.0 OSTEOPOROSIS, UNSPECIFIED OSTEOPOROSIS TYPE, UNSPECIFIED PATHOLOGICAL FRACTURE PRESENCE: Primary | ICD-10-CM

## 2019-11-04 PROCEDURE — 99213 OFFICE O/P EST LOW 20 MIN: CPT | Mod: S$PBB,,, | Performed by: STUDENT IN AN ORGANIZED HEALTH CARE EDUCATION/TRAINING PROGRAM

## 2019-11-04 PROCEDURE — 96372 THER/PROPH/DIAG INJ SC/IM: CPT | Mod: PBBFAC

## 2019-11-04 PROCEDURE — 99213 OFFICE O/P EST LOW 20 MIN: CPT | Mod: PBBFAC | Performed by: STUDENT IN AN ORGANIZED HEALTH CARE EDUCATION/TRAINING PROGRAM

## 2019-11-04 PROCEDURE — 99999 PR PBB SHADOW E&M-EST. PATIENT-LVL III: ICD-10-PCS | Mod: PBBFAC,,, | Performed by: STUDENT IN AN ORGANIZED HEALTH CARE EDUCATION/TRAINING PROGRAM

## 2019-11-04 PROCEDURE — 99999 PR PBB SHADOW E&M-EST. PATIENT-LVL III: CPT | Mod: PBBFAC,,, | Performed by: STUDENT IN AN ORGANIZED HEALTH CARE EDUCATION/TRAINING PROGRAM

## 2019-11-04 PROCEDURE — 99213 PR OFFICE/OUTPT VISIT, EST, LEVL III, 20-29 MIN: ICD-10-PCS | Mod: S$PBB,,, | Performed by: STUDENT IN AN ORGANIZED HEALTH CARE EDUCATION/TRAINING PROGRAM

## 2019-11-04 RX ADMIN — DENOSUMAB 60 MG: 60 INJECTION SUBCUTANEOUS at 09:11

## 2019-11-04 NOTE — PROGRESS NOTES
Administered 1cc Prolia 60mg/cc to left upper quad of abdomen. Pt tolerated well. No acute reaction noted at site. Pt instructed on S/S of reaction to report. Pt verbalized understanding. Patient waited 15 minutes post injection    Lot:[id number]  Exp.11/21  Manu:Feng    Calcium:10.5  Creatinine:0.6

## 2019-11-08 NOTE — TELEPHONE ENCOUNTER
Praluent initial consultation, injection training, and shipment attempted.  NA/CRISTYM. Mychart msg. $35 copay.

## 2019-11-11 NOTE — PROGRESS NOTES
RHEUMATOLOGY OUTPATIENT CLINIC NOTE        Attending Rheumatologist: Yoon Pennington  Primary Care Provider: Porter Vasques MD   Physician Requesting Consultation: Yoon Pennington MD  [address] [place] [address]  Chief Complaint/Reason For Consultation:  prolia fu    Subjective:       HPI  Joyce Marino is a 74 y.o. White female presents for evaluation of osteoporosis and ankle pain. Has hx of severe osteoarthritis in left ankle. Previous hx of fx x 2 in LLE s/p fall. Previous hx of fosamax. Unable to tolerate 2.2 GERD. Pain currently 6/10. Relieved w aleve usually. Not on daily ca/vit d. No other acute issues or complaints    Today:  Pt reports overall doing well since last visit. No issues w prolia. No falls. No fx. No other acute issues or complaints.     Review of Systems   Constitutional: Negative for fever and weight loss.   HENT: Negative for ear discharge and hearing loss.    Eyes: Negative for blurred vision and photophobia.   Respiratory: Negative for cough and sputum production.    Cardiovascular: Negative for chest pain and orthopnea.   Gastrointestinal: Negative for abdominal pain, heartburn and vomiting.   Genitourinary: Negative for dysuria and frequency.   Musculoskeletal: Negative for back pain and myalgias.   Skin: Negative for itching and rash.   Neurological: Negative for dizziness and tremors.   Endo/Heme/Allergies: Negative for polydipsia. Does not bruise/bleed easily.   Psychiatric/Behavioral: Negative for depression and substance abuse.       Chronic comorbid conditions affecting medical decision making today:  Past Medical History:   Diagnosis Date    Arthritis     Asthma     Hyperlipidemia     Hypertension      Past Surgical History:   Procedure Laterality Date    HYSTERECTOMY  1992    for fibroids    OOPHORECTOMY      BSO at time of hysterectomy    michael in right leg       Family History   Problem Relation Age of Onset    Heart disease Mother      Emphysema Mother     Heart disease Father     Lung cancer Maternal Grandfather      Social History     Substance and Sexual Activity   Alcohol Use No     Social History     Tobacco Use   Smoking Status Never Smoker   Smokeless Tobacco Never Used     Social History     Substance and Sexual Activity   Drug Use No       Current Outpatient Medications:     alirocumab (PRALUENT PEN) 150 mg/mL PnIj, Inject 1 mL (150 mg total) into the skin every 14 (fourteen) days., Disp: 2 Syringe, Rfl: 12    ferrous sulfate 325 mg (65 mg iron) Tab tablet, Take 1 tablet (325 mg total) by mouth daily with breakfast., Disp: 60 tablet, Rfl: 0    glucosamine HCl-msm-chondroitn 750-375-400 mg Tab, Take 1 tablet by mouth 2 (two) times daily., Disp: , Rfl:     KLOR-CON M20 20 mEq tablet, TAKE ONE TABLET BY MOUTH ONCE DAILY, Disp: 30 tablet, Rfl: 4    Lactobacillus acidophilus (PROBIOTIC) 10 billion cell Cap, Take 1 capsule by mouth once daily., Disp: , Rfl:     losartan-hydrochlorothiazide 50-12.5 mg (HYZAAR) 50-12.5 mg per tablet, Take 1 tablet by mouth once daily., Disp: 90 tablet, Rfl: 3    meloxicam (MOBIC) 15 MG tablet, Take 1 tablet (15 mg total) by mouth once daily., Disp: 90 tablet, Rfl: 1    multivitamin with minerals tablet, Take 1 tablet by mouth once daily., Disp: , Rfl:     turmeric root extract 500 mg Cap, Take 1 capsule by mouth 2 (two) times daily., Disp: , Rfl:     Current Facility-Administered Medications:     denosumab (PROLIA) injection 60 mg, 60 mg, Subcutaneous, Q6 Months, Yoon Pennington MD, 60 mg at 11/04/19 0939       Objective:         Vitals:    11/04/19 0908   BP: (!) 140/72   Pulse: 68     Physical Exam   Constitutional: She is oriented to person, place, and time and well-developed, well-nourished, and in no distress. No distress.   HENT:   Head: Normocephalic and atraumatic.   Eyes: Conjunctivae and EOM are normal. Pupils are equal, round, and reactive to light.   Neck: Normal range of motion. Neck  supple.   Cardiovascular: Normal rate and regular rhythm.    Pulmonary/Chest: Effort normal and breath sounds normal.   Abdominal: Soft. Bowel sounds are normal.   Neurological: She is alert and oriented to person, place, and time.   Skin: Skin is warm and dry. She is not diaphoretic.     Psychiatric: Affect and judgment normal.   Musculoskeletal: Normal range of motion. She exhibits deformity. She exhibits no edema.   No synovitis in small joints of hands and feet           Reviewed old and all outside pertinent medical records available.    All lab results personally reviewed and interpreted by me.  Lab Results   Component Value Date    WBC 5.72 10/22/2019    HGB 12.2 10/22/2019    HCT 39.8 10/22/2019    MCV 82 10/22/2019    MCH 25.1 (L) 10/22/2019    MCHC 30.7 (L) 10/22/2019    RDW 16.3 (H) 10/22/2019     10/22/2019    MPV 10.8 10/22/2019       Lab Results   Component Value Date     10/22/2019    K 4.2 10/22/2019     10/22/2019    CO2 26 10/22/2019    GLU 92 10/22/2019    BUN 16 10/22/2019    CALCIUM 10.5 10/22/2019    PROT 7.4 10/22/2019    ALBUMIN 4.0 10/22/2019    BILITOT 0.9 10/22/2019    AST 17 10/22/2019    ALKPHOS 61 10/22/2019    ALT 13 10/22/2019       Lab Results   Component Value Date    COLORU YELLOW 08/31/2011    APPEARANCEUA CLEAR 08/31/2011    SPECGRAV 1.017 08/31/2011    PHUR 5.0 08/31/2011    PROTEINUA Negative 08/31/2011    KETONESU Negative 08/31/2011    LEUKOCYTESUR Negative 08/31/2011    NITRITE Negative 08/31/2011    UROBILINOGEN 0.2 08/31/2011       No results found for: CRP    No results found for: SEDRATE, ERYTHROCYTES    No results found for: AUDREY, RF, SEDRATE    No components found for: 25OHVITDTOT, 38WCOWHW1, 41ZIJPTF3, METHODNOTE    No results found for: URICACID    No components found for: TSPOTTB      Imaging:  All imaging reviewed and independently  interpreted by me.    Ankle XR  Findings: No acute fractures or dislocations visualized. There is severe asymmetric  joint space narrowing along the lateral aspect of the tibiotalar articulation with associated degenerative subarticular sclerosis and cystic changes.  There is also degenerative spurring at the distal tips of the medial and lateral malleoli. There is suggestion of a probable ankle joint effusion.     ASSESSMENT / PLAN:     Joyce Marino is a 74 y.o. White female with:           #. Osteoporosis, unspecified osteoporosis type, unspecified pathological fracture presence  -ca/cr reviewed, elevated ca- ok to give prolia  -previously unable to tolerate fosamax, will start prolia #1   -continue q6months ; discussed risks /benefits of all meds including increased risk of ONJ. Also discussed possible increased risk of vertebral fx if discontinued.     Method of contact with patient concerns: Marilynn attn Rheumatology      Yoon Pennington M.D.  Rheumatology Department   [facility] - [address] [address]  Phone: [phone]  Fax: [phone]

## 2019-11-22 ENCOUNTER — PATIENT MESSAGE (OUTPATIENT)
Dept: ENDOCRINOLOGY | Facility: CLINIC | Age: 74
End: 2019-11-22

## 2019-11-22 ENCOUNTER — TELEPHONE (OUTPATIENT)
Dept: ENDOCRINOLOGY | Facility: CLINIC | Age: 74
End: 2019-11-22

## 2019-11-22 NOTE — TELEPHONE ENCOUNTER
Calling to schedule appointment will also send manjinder in portal          ----- Message from Farzana Cuba sent at 11/22/2019  2:28 PM CST -----  Contact: pt   She's calling in regard to missed call; appt was cancelled           ,pls call pt back at [phone] (home)

## 2019-11-26 NOTE — TELEPHONE ENCOUNTER
2nd attempt - Praluent initial consultation, injection training, and shipment attempted. NA/LVM. Patient read [facility] msg on 11/13/19. $35 copay.

## 2019-12-18 ENCOUNTER — HOSPITAL ENCOUNTER (OUTPATIENT)
Dept: RADIOLOGY | Facility: HOSPITAL | Age: 74
Discharge: HOME | End: 2019-12-18
Attending: FAMILY MEDICINE
Payer: MEDICARE

## 2019-12-18 VITALS — BODY MASS INDEX: 23.74 KG/M2 | WEIGHT: 147.69 LBS | HEIGHT: 66 IN

## 2019-12-18 DIAGNOSIS — Z12.39 BREAST CANCER SCREENING: ICD-10-CM

## 2019-12-18 DIAGNOSIS — Z12.31 ENCOUNTER FOR SCREENING MAMMOGRAM FOR MALIGNANT NEOPLASM OF BREAST: ICD-10-CM

## 2019-12-18 PROCEDURE — 77063 MAMMO DIGITAL SCREENING BILAT WITH TOMOSYNTHESIS_CAD: ICD-10-PCS | Mod: 26,,, | Performed by: RADIOLOGY

## 2019-12-18 PROCEDURE — 77067 MAMMO DIGITAL SCREENING BILAT WITH TOMOSYNTHESIS_CAD: ICD-10-PCS | Mod: 26,,, | Performed by: RADIOLOGY

## 2019-12-18 PROCEDURE — 77067 SCR MAMMO BI INCL CAD: CPT | Mod: TC

## 2019-12-18 PROCEDURE — 77067 SCR MAMMO BI INCL CAD: CPT | Mod: 26,,, | Performed by: RADIOLOGY

## 2019-12-18 PROCEDURE — 77063 BREAST TOMOSYNTHESIS BI: CPT | Mod: 26,,, | Performed by: RADIOLOGY

## 2020-01-27 NOTE — TELEPHONE ENCOUNTER
----- Message from Zena Garay sent at 1/27/2020  2:16 PM CST -----  Contact: self  Patient requesting a call back to discuss having her BP medication changed because Lorsartan is never available. Please call patient back at [phone]

## 2020-01-28 RX ORDER — HYDROCHLOROTHIAZIDE 12.5 MG/1
12.5 TABLET ORAL DAILY
Qty: 30 TABLET | Refills: 4 | Status: SHIPPED | OUTPATIENT
Start: 2020-01-28 | End: 2020-03-26 | Stop reason: SDUPTHER

## 2020-01-28 RX ORDER — LOSARTAN POTASSIUM 50 MG/1
50 TABLET ORAL DAILY
Qty: 30 TABLET | Refills: 4 | Status: SHIPPED | OUTPATIENT
Start: 2020-01-28 | End: 2020-03-26 | Stop reason: SDUPTHER

## 2020-03-15 ENCOUNTER — TELEPHONE (OUTPATIENT)
Dept: CARDIOLOGY | Facility: HOSPITAL | Age: 75
End: 2020-03-15

## 2020-03-15 DIAGNOSIS — R07.9 CHEST PAIN, UNSPECIFIED TYPE: ICD-10-CM

## 2020-03-15 DIAGNOSIS — R07.89 ATYPICAL CHEST PAIN: ICD-10-CM

## 2020-03-15 DIAGNOSIS — I35.0 NONRHEUMATIC AORTIC VALVE STENOSIS: ICD-10-CM

## 2020-03-15 DIAGNOSIS — I35.0 AORTIC VALVE STENOSIS, ETIOLOGY OF CARDIAC VALVE DISEASE UNSPECIFIED: ICD-10-CM

## 2020-03-15 DIAGNOSIS — I35.1 NONRHEUMATIC AORTIC VALVE INSUFFICIENCY: ICD-10-CM

## 2020-03-15 DIAGNOSIS — I10 ESSENTIAL HYPERTENSION: Primary | ICD-10-CM

## 2020-03-15 NOTE — TELEPHONE ENCOUNTER
----- Message from Wiliam Cristina sent at 3/15/2020  1:44 PM CDT -----  Need order for echo for next appt

## 2020-03-20 ENCOUNTER — TELEPHONE (OUTPATIENT)
Dept: RHEUMATOLOGY | Facility: CLINIC | Age: 75
End: 2020-03-20

## 2020-03-20 ENCOUNTER — PATIENT MESSAGE (OUTPATIENT)
Dept: FAMILY MEDICINE | Facility: CLINIC | Age: 75
End: 2020-03-20

## 2020-03-20 NOTE — TELEPHONE ENCOUNTER
----- Message from Kannan Espino sent at 3/20/2020 11:35 AM CDT -----  Contact: self  Type:  RX Refill Request    Who Called: pt  Refill or New Rx:refill  RX Name and Strength:tramadol  How is the patient currently taking it? (ex. 1XDay):as needed  Is this a 30 day or 90 day RX:90  Preferred Pharmacy with phone number:  [facility] [address] [address]  Phone: [phone] Fax: [phone]  Local or Mail Order:mail order  Ordering Provider:ector  Would the patient rather a call back or a response via [facility]? Call back  Best Call Back Number:[phone]  Additional Information: call pt when refill has been sent     Thanks,  Kannan Espino

## 2020-03-23 RX ORDER — TRAMADOL HYDROCHLORIDE 50 MG/1
50 TABLET ORAL EVERY 6 HOURS PRN
Qty: 30 TABLET | Refills: 3 | Status: SHIPPED | OUTPATIENT
Start: 2020-03-23

## 2020-03-26 DIAGNOSIS — I10 HYPERTENSION, UNSPECIFIED TYPE: Primary | ICD-10-CM

## 2020-03-26 RX ORDER — HYDROCHLOROTHIAZIDE 12.5 MG/1
12.5 TABLET ORAL DAILY
Qty: 90 TABLET | Refills: 0 | Status: SHIPPED | OUTPATIENT
Start: 2020-03-26 | End: 2020-03-31 | Stop reason: SDUPTHER

## 2020-03-26 RX ORDER — LOSARTAN POTASSIUM 50 MG/1
50 TABLET ORAL DAILY
Qty: 90 TABLET | Refills: 0 | Status: SHIPPED | OUTPATIENT
Start: 2020-03-26 | End: 2020-03-31 | Stop reason: SDUPTHER

## 2020-03-27 RX ORDER — MELOXICAM 15 MG/1
TABLET ORAL
Qty: 90 TABLET | Refills: 3 | Status: SHIPPED | OUTPATIENT
Start: 2020-03-27 | End: 2021-03-22

## 2020-03-31 DIAGNOSIS — I10 HYPERTENSION, UNSPECIFIED TYPE: ICD-10-CM

## 2020-03-31 RX ORDER — LOSARTAN POTASSIUM 50 MG/1
50 TABLET ORAL DAILY
Qty: 90 TABLET | Refills: 0 | Status: SHIPPED | OUTPATIENT
Start: 2020-03-31 | End: 2020-08-10 | Stop reason: SDUPTHER

## 2020-03-31 RX ORDER — HYDROCHLOROTHIAZIDE 12.5 MG/1
12.5 TABLET ORAL DAILY
Qty: 90 TABLET | Refills: 0 | Status: SHIPPED | OUTPATIENT
Start: 2020-03-31 | End: 2020-04-13 | Stop reason: SDUPTHER

## 2020-04-13 DIAGNOSIS — I10 HYPERTENSION, UNSPECIFIED TYPE: ICD-10-CM

## 2020-04-13 RX ORDER — HYDROCHLOROTHIAZIDE 12.5 MG/1
12.5 TABLET ORAL DAILY
Qty: 90 TABLET | Refills: 0 | Status: SHIPPED | OUTPATIENT
Start: 2020-04-13 | End: 2020-08-10 | Stop reason: SDUPTHER

## 2020-04-23 ENCOUNTER — TELEPHONE (OUTPATIENT)
Dept: RHEUMATOLOGY | Facility: CLINIC | Age: 75
End: 2020-04-23

## 2020-08-10 ENCOUNTER — PATIENT MESSAGE (OUTPATIENT)
Dept: FAMILY MEDICINE | Facility: CLINIC | Age: 75
End: 2020-08-10

## 2020-08-10 DIAGNOSIS — I10 HYPERTENSION, UNSPECIFIED TYPE: ICD-10-CM

## 2020-08-10 RX ORDER — HYDROCHLOROTHIAZIDE 12.5 MG/1
12.5 TABLET ORAL DAILY
Qty: 90 TABLET | Refills: 1 | Status: SHIPPED | OUTPATIENT
Start: 2020-08-10 | End: 2020-12-09 | Stop reason: SDUPTHER

## 2020-08-10 RX ORDER — LOSARTAN POTASSIUM 50 MG/1
50 TABLET ORAL DAILY
Qty: 90 TABLET | Refills: 1 | Status: SHIPPED | OUTPATIENT
Start: 2020-08-10 | End: 2020-12-09 | Stop reason: SDUPTHER

## 2020-09-29 ENCOUNTER — PATIENT MESSAGE (OUTPATIENT)
Dept: OTHER | Facility: OTHER | Age: 75
End: 2020-09-29

## 2020-11-12 DIAGNOSIS — I10 HYPERTENSION, UNSPECIFIED TYPE: ICD-10-CM

## 2020-11-12 RX ORDER — LOSARTAN POTASSIUM 50 MG/1
50 TABLET ORAL DAILY
Qty: 90 TABLET | Refills: 1 | OUTPATIENT
Start: 2020-11-12

## 2020-11-12 RX ORDER — HYDROCHLOROTHIAZIDE 12.5 MG/1
12.5 TABLET ORAL DAILY
Qty: 90 TABLET | Refills: 1 | OUTPATIENT
Start: 2020-11-12

## 2020-12-09 ENCOUNTER — LAB VISIT (OUTPATIENT)
Dept: LAB | Facility: HOSPITAL | Age: 75
End: 2020-12-09
Attending: FAMILY MEDICINE
Payer: MEDICARE

## 2020-12-09 ENCOUNTER — OFFICE VISIT (OUTPATIENT)
Dept: FAMILY MEDICINE | Facility: CLINIC | Age: 75
End: 2020-12-09
Payer: MEDICARE

## 2020-12-09 VITALS
HEART RATE: 74 BPM | SYSTOLIC BLOOD PRESSURE: 138 MMHG | OXYGEN SATURATION: 98 % | HEIGHT: 66 IN | BODY MASS INDEX: 23.95 KG/M2 | WEIGHT: 149.06 LBS | DIASTOLIC BLOOD PRESSURE: 72 MMHG | TEMPERATURE: 98 F

## 2020-12-09 DIAGNOSIS — I10 HYPERTENSION, UNSPECIFIED TYPE: ICD-10-CM

## 2020-12-09 DIAGNOSIS — Z12.39 ENCOUNTER FOR SCREENING FOR MALIGNANT NEOPLASM OF BREAST, UNSPECIFIED SCREENING MODALITY: ICD-10-CM

## 2020-12-09 DIAGNOSIS — M79.10 MYALGIA DUE TO HMG COA REDUCTASE INHIBITOR: ICD-10-CM

## 2020-12-09 DIAGNOSIS — E78.2 MIXED HYPERLIPIDEMIA: ICD-10-CM

## 2020-12-09 DIAGNOSIS — M81.0 OSTEOPOROSIS, POST-MENOPAUSAL: ICD-10-CM

## 2020-12-09 DIAGNOSIS — T46.6X5A MYALGIA DUE TO HMG COA REDUCTASE INHIBITOR: ICD-10-CM

## 2020-12-09 DIAGNOSIS — M25.572 CHRONIC PAIN OF LEFT ANKLE: Primary | ICD-10-CM

## 2020-12-09 DIAGNOSIS — G89.29 CHRONIC PAIN OF LEFT ANKLE: Primary | ICD-10-CM

## 2020-12-09 DIAGNOSIS — E21.3 HYPERPARATHYROIDISM: ICD-10-CM

## 2020-12-09 LAB
BASOPHILS # BLD AUTO: 0.08 K/UL (ref 0–0.2)
BASOPHILS NFR BLD: 1.5 % (ref 0–1.9)
DIFFERENTIAL METHOD: ABNORMAL
EOSINOPHIL # BLD AUTO: 0.5 K/UL (ref 0–0.5)
EOSINOPHIL NFR BLD: 10.2 % (ref 0–8)
ERYTHROCYTE [DISTWIDTH] IN BLOOD BY AUTOMATED COUNT: 12.5 % (ref 11.5–14.5)
HCT VFR BLD AUTO: 40.1 % (ref 37–48.5)
HGB BLD-MCNC: 12.6 G/DL (ref 12–16)
IMM GRANULOCYTES # BLD AUTO: 0.01 K/UL (ref 0–0.04)
IMM GRANULOCYTES NFR BLD AUTO: 0.2 % (ref 0–0.5)
LYMPHOCYTES # BLD AUTO: 1.4 K/UL (ref 1–4.8)
LYMPHOCYTES NFR BLD: 25.4 % (ref 18–48)
MCH RBC QN AUTO: 28.1 PG (ref 27–31)
MCHC RBC AUTO-ENTMCNC: 31.4 G/DL (ref 32–36)
MCV RBC AUTO: 89 FL (ref 82–98)
MONOCYTES # BLD AUTO: 0.6 K/UL (ref 0.3–1)
MONOCYTES NFR BLD: 10.7 % (ref 4–15)
NEUTROPHILS # BLD AUTO: 2.8 K/UL (ref 1.8–7.7)
NEUTROPHILS NFR BLD: 52 % (ref 38–73)
NRBC BLD-RTO: 0 /100 WBC
PLATELET # BLD AUTO: 275 K/UL (ref 150–350)
PMV BLD AUTO: 10.6 FL (ref 9.2–12.9)
RBC # BLD AUTO: 4.49 M/UL (ref 4–5.4)
WBC # BLD AUTO: 5.31 K/UL (ref 3.9–12.7)

## 2020-12-09 PROCEDURE — 80061 LIPID PANEL: CPT

## 2020-12-09 PROCEDURE — 99999 PR PBB SHADOW E&M-EST. PATIENT-LVL IV: CPT | Mod: PBBFAC,,, | Performed by: FAMILY MEDICINE

## 2020-12-09 PROCEDURE — 36415 COLL VENOUS BLD VENIPUNCTURE: CPT | Mod: PO

## 2020-12-09 PROCEDURE — 99999 PR PBB SHADOW E&M-EST. PATIENT-LVL IV: ICD-10-PCS | Mod: PBBFAC,,, | Performed by: FAMILY MEDICINE

## 2020-12-09 PROCEDURE — 85025 COMPLETE CBC W/AUTO DIFF WBC: CPT

## 2020-12-09 PROCEDURE — 99214 OFFICE O/P EST MOD 30 MIN: CPT | Mod: PBBFAC,PO | Performed by: FAMILY MEDICINE

## 2020-12-09 PROCEDURE — 99214 OFFICE O/P EST MOD 30 MIN: CPT | Mod: S$PBB,,, | Performed by: FAMILY MEDICINE

## 2020-12-09 PROCEDURE — 80053 COMPREHEN METABOLIC PANEL: CPT

## 2020-12-09 PROCEDURE — 99214 PR OFFICE/OUTPT VISIT, EST, LEVL IV, 30-39 MIN: ICD-10-PCS | Mod: S$PBB,,, | Performed by: FAMILY MEDICINE

## 2020-12-09 RX ORDER — HYDROCHLOROTHIAZIDE 12.5 MG/1
12.5 TABLET ORAL DAILY
Qty: 90 TABLET | Refills: 3 | Status: SHIPPED | OUTPATIENT
Start: 2020-12-09 | End: 2021-11-11

## 2020-12-09 RX ORDER — LOSARTAN POTASSIUM 50 MG/1
50 TABLET ORAL DAILY
Qty: 90 TABLET | Refills: 3 | Status: SHIPPED | OUTPATIENT
Start: 2020-12-09 | End: 2021-11-11

## 2020-12-09 RX ORDER — POTASSIUM CHLORIDE 20 MEQ/1
20 TABLET, EXTENDED RELEASE ORAL DAILY
Qty: 90 TABLET | Refills: 3 | Status: SHIPPED | OUTPATIENT
Start: 2020-12-09 | End: 2021-11-11

## 2020-12-09 NOTE — PROGRESS NOTES
Chief Complaint:    Chief Complaint   Patient presents with    Annual Exam       History of Present Illness:  Presents today for six-month follow-up:  Blood pressure is okay today  Following with Rheumatology for osteoporosis treatment, she does not want to go back for anymore Prolia injections because she has had bad side effects does not want any treatment for osteoporosis.  Therefore does not want any more screening DEXA scan either.  Also has significant osteoarthrosis.  Patient's labs reveals hypercalcemia last time along with mild hyperparathyroidism, she was asked to see an endocrinologist but she says she does not want to go to [place].  She wants to look for an endocrinologist locally.  But she is not ready for it right now.  She has hyperlipidemia with a 10 year risk of heart disease at 16% has had significant intolerance to statin causing muscle pain so she does not want to take any more statins.  Not interested and injectable like Aimee Evans with chronic ankle pain has seen orthopedics in the past and was told that she has arthritis takes meloxicam as needed.      ROS:  Review of Systems   Constitutional: Negative for activity change, chills, fatigue, fever and unexpected weight change.   HENT: Negative for congestion, ear discharge, ear pain, hearing loss, postnasal drip and rhinorrhea.    Eyes: Negative for pain and visual disturbance.   Respiratory: Negative for cough, chest tightness and shortness of breath.    Cardiovascular: Negative for chest pain and palpitations.   Gastrointestinal: Negative for abdominal pain, diarrhea and vomiting.   Endocrine: Negative for heat intolerance.   Genitourinary: Negative for dysuria, flank pain, frequency and hematuria.   Musculoskeletal: Negative for back pain, gait problem and neck pain.   Skin: Negative for color change and rash.   Neurological: Negative for dizziness, tremors, seizures, numbness and headaches.   Psychiatric/Behavioral: Negative  for agitation, hallucinations, self-injury, sleep disturbance and suicidal ideas. The patient is not nervous/anxious.        Past Medical History:   Diagnosis Date    Arthritis     Asthma     Hyperlipidemia     Hypertension        Social History:  Social History     Socioeconomic History    Marital status: Single     Spouse name: Not on file    Number of children: Not on file    Years of education: Not on file    Highest education level: Not on file   Occupational History    Not on file   Social Needs    Financial resource strain: Not on file    Food insecurity     Worry: Not on file     Inability: Not on file    Transportation needs     Medical: Not on file     Non-medical: Not on file   Tobacco Use    Smoking status: Never Smoker    Smokeless tobacco: Never Used   Substance and Sexual Activity    Alcohol use: No    Drug use: No    Sexual activity: Never     Partners: Male     Birth control/protection: None   Lifestyle    Physical activity     Days per week: Not on file     Minutes per session: Not on file    Stress: Not on file   Relationships    Social connections     Talks on phone: Not on file     Gets together: Not on file     Attends Religion service: Not on file     Active member of club or organization: Not on file     Attends meetings of clubs or organizations: Not on file     Relationship status: Not on file   Other Topics Concern    Not on file   Social History Narrative    Not on file       Family History:   family history includes Emphysema in her mother; Heart disease in her father and mother; Lung cancer in her maternal grandfather.    Health Maintenance   Topic Date Due    Lipid Panel  10/22/2020    Mammogram  12/18/2020    DEXA SCAN  12/09/2020 (Originally 10/6/2019)    TETANUS VACCINE  04/04/2024    Hepatitis C Screening  Completed    Pneumococcal Vaccine (65+ Low/Medium Risk)  Completed       Physical Exam:    Vital Signs  Temp: 97.7 °F (36.5 °C)  Pulse: 74  SpO2: 98  "%  BP: (!) 154/102(out of meds)  Pain Score: 0-No pain  Height and Weight  Height: 5' 6" (167.6 cm)  Weight: 67.6 kg (149 lb 0.5 oz)  BSA (Calculated - sq m): 1.77 sq meters  BMI (Calculated): 24.1  Weight in (lb) to have BMI = 25: 154.6]    Body mass index is 24.05 kg/m².    Physical Exam  Constitutional:       Appearance: She is well-developed.   Eyes:      Conjunctiva/sclera: Conjunctivae normal.      Pupils: Pupils are equal, round, and reactive to light.   Neck:      Musculoskeletal: Normal range of motion and neck supple.   Cardiovascular:      Rate and Rhythm: Normal rate and regular rhythm.      Heart sounds: Murmur present. Systolic murmur present with a grade of 3/6.   Pulmonary:      Effort: Pulmonary effort is normal. No respiratory distress.      Breath sounds: Normal breath sounds. No wheezing or rales.   Chest:      Chest wall: No tenderness.   Abdominal:      General: There is no distension.      Palpations: Abdomen is soft. There is no mass.      Tenderness: There is no abdominal tenderness. There is no guarding.   Musculoskeletal:         General: No tenderness.   Lymphadenopathy:      Cervical: No cervical adenopathy.   Skin:     General: Skin is warm and dry.   Neurological:      Mental Status: She is alert and oriented to person, place, and time.      Deep Tendon Reflexes: Reflexes are normal and symmetric.   Psychiatric:         Behavior: Behavior normal.         Thought Content: Thought content normal.         Judgment: Judgment normal.           Assessment:      ICD-10-CM ICD-9-CM   1. Chronic pain of left ankle  M25.572 719.47    G89.29 338.29   2. Hypertension, unspecified type  I10 401.9   3. Osteoporosis, post-menopausal  M81.0 733.01   4. Myalgia due to HMG CoA reductase inhibitor  M79.10 729.1    T46.6X5A E942.2   5. Encounter for screening for malignant neoplasm of breast, unspecified screening modality  Z12.39 V76.10   6. Mixed hyperlipidemia  E78.2 272.2   7. Hyperparathyroidism  " E21.3 252.00         Plan:    Hyperlipidemia intolerance to statin refuse injectables  Wants to wait for mammogram until June July  She will find an endocrinologist on her own for the mild hyper calcemia/hyperparathyroidism.  Chronic ankle pain takes NSAIDs recommend not use it on a regular basis.    Other medical problems are stable continue current meds and plan  Home blood pressure readings are normal     Follow-up once yearly  Orders Placed This Encounter   Procedures    Mammo Digital Screening Bilat    CBC Auto Differential    Comprehensive Metabolic Panel    Lipid Panel       Current Outpatient Medications   Medication Sig Dispense Refill    glucosamine HCl-msm-chondroitn 750-375-400 mg Tab Take 1 tablet by mouth 2 (two) times daily.      hydroCHLOROthiazide (HYDRODIURIL) 12.5 MG Tab Take 1 tablet (12.5 mg total) by mouth once daily. 90 tablet 3    Lactobacillus acidophilus (PROBIOTIC) 10 billion cell Cap Take 1 capsule by mouth once daily.      losartan (COZAAR) 50 MG tablet Take 1 tablet (50 mg total) by mouth once daily. 90 tablet 3    meloxicam (MOBIC) 15 MG tablet TAKE 1 TABLET DAILY 90 tablet 3    multivitamin with minerals tablet Take 1 tablet by mouth once daily.      potassium chloride SA (KLOR-CON M20) 20 MEQ tablet Take 1 tablet (20 mEq total) by mouth once daily. 90 tablet 3    turmeric root extract 500 mg Cap Take 1 capsule by mouth 2 (two) times daily.      ferrous sulfate 325 mg (65 mg iron) Tab tablet Take 1 tablet (325 mg total) by mouth daily with breakfast. (Patient not taking: Reported on 12/9/2020) 60 tablet 0    traMADoL (ULTRAM) 50 mg tablet Take 1 tablet (50 mg total) by mouth every 6 (six) hours as needed for Pain. (Patient not taking: Reported on 12/9/2020) 30 tablet 3     Current Facility-Administered Medications   Medication Dose Route Frequency Provider Last Rate Last Dose    denosumab (PROLIA) injection 60 mg  60 mg Subcutaneous Q6 Months Yoon Pennington MD   60 mg  at 11/04/19 0939       Medications Discontinued During This Encounter   Medication Reason    KLOR-CON M20 20 mEq tablet Reorder    losartan (COZAAR) 50 MG tablet Reorder    hydroCHLOROthiazide (HYDRODIURIL) 12.5 MG Tab Reorder       Follow up in about 1 year (around 12/9/2021).      Porter Vasques MD

## 2020-12-10 LAB
ALBUMIN SERPL BCP-MCNC: 3.9 G/DL (ref 3.5–5.2)
ALP SERPL-CCNC: 92 U/L (ref 55–135)
ALT SERPL W/O P-5'-P-CCNC: 17 U/L (ref 10–44)
ANION GAP SERPL CALC-SCNC: 11 MMOL/L (ref 8–16)
AST SERPL-CCNC: 18 U/L (ref 10–40)
BILIRUB SERPL-MCNC: 1 MG/DL (ref 0.1–1)
BUN SERPL-MCNC: 18 MG/DL (ref 8–23)
CALCIUM SERPL-MCNC: 10 MG/DL (ref 8.7–10.5)
CHLORIDE SERPL-SCNC: 106 MMOL/L (ref 95–110)
CHOLEST SERPL-MCNC: 244 MG/DL (ref 120–199)
CHOLEST/HDLC SERPL: 3.2 {RATIO} (ref 2–5)
CO2 SERPL-SCNC: 24 MMOL/L (ref 23–29)
CREAT SERPL-MCNC: 0.6 MG/DL (ref 0.5–1.4)
EST. GFR  (AFRICAN AMERICAN): >60 ML/MIN/1.73 M^2
EST. GFR  (NON AFRICAN AMERICAN): >60 ML/MIN/1.73 M^2
GLUCOSE SERPL-MCNC: 98 MG/DL (ref 70–110)
HDLC SERPL-MCNC: 76 MG/DL (ref 40–75)
HDLC SERPL: 31.1 % (ref 20–50)
LDLC SERPL CALC-MCNC: 144.2 MG/DL (ref 63–159)
NONHDLC SERPL-MCNC: 168 MG/DL
POTASSIUM SERPL-SCNC: 4.2 MMOL/L (ref 3.5–5.1)
PROT SERPL-MCNC: 7.1 G/DL (ref 6–8.4)
SODIUM SERPL-SCNC: 141 MMOL/L (ref 136–145)
TRIGL SERPL-MCNC: 119 MG/DL (ref 30–150)

## 2020-12-11 ENCOUNTER — PATIENT MESSAGE (OUTPATIENT)
Dept: OTHER | Facility: OTHER | Age: 75
End: 2020-12-11

## 2021-01-21 ENCOUNTER — TELEPHONE (OUTPATIENT)
Dept: ADMINISTRATIVE | Facility: HOSPITAL | Age: 76
End: 2021-01-21

## 2021-05-12 ENCOUNTER — HOSPITAL ENCOUNTER (OUTPATIENT)
Dept: RADIOLOGY | Facility: HOSPITAL | Age: 76
Discharge: HOME | End: 2021-05-12
Attending: FAMILY MEDICINE
Payer: MEDICARE

## 2021-05-12 DIAGNOSIS — Z12.31 VISIT FOR SCREENING MAMMOGRAM: ICD-10-CM

## 2021-05-12 DIAGNOSIS — Z12.39 ENCOUNTER FOR SCREENING FOR MALIGNANT NEOPLASM OF BREAST, UNSPECIFIED SCREENING MODALITY: ICD-10-CM

## 2021-05-12 PROCEDURE — 77063 BREAST TOMOSYNTHESIS BI: CPT | Mod: 26,,, | Performed by: RADIOLOGY

## 2021-05-12 PROCEDURE — 77067 SCR MAMMO BI INCL CAD: CPT | Mod: TC

## 2021-05-12 PROCEDURE — 77067 MAMMO DIGITAL SCREENING BILAT WITH TOMO: ICD-10-PCS | Mod: 26,,, | Performed by: RADIOLOGY

## 2021-05-12 PROCEDURE — 77063 MAMMO DIGITAL SCREENING BILAT WITH TOMO: ICD-10-PCS | Mod: 26,,, | Performed by: RADIOLOGY

## 2021-05-12 PROCEDURE — 77067 SCR MAMMO BI INCL CAD: CPT | Mod: 26,,, | Performed by: RADIOLOGY

## 2022-02-24 RX ORDER — MELOXICAM 15 MG/1
TABLET ORAL
Qty: 90 TABLET | Refills: 3 | OUTPATIENT
Start: 2022-02-24

## 2022-04-26 ENCOUNTER — TELEPHONE (OUTPATIENT)
Dept: ADMINISTRATIVE | Facility: HOSPITAL | Age: 77
End: 2022-04-26
Payer: MEDICARE

## 2022-04-27 ENCOUNTER — PATIENT MESSAGE (OUTPATIENT)
Dept: ADMINISTRATIVE | Facility: HOSPITAL | Age: 77
End: 2022-04-27
Payer: MEDICARE

## 2022-06-03 ENCOUNTER — OFFICE VISIT (OUTPATIENT)
Dept: URGENT CARE | Facility: CLINIC | Age: 77
End: 2022-06-03
Payer: COMMERCIAL

## 2022-06-03 VITALS
HEART RATE: 69 BPM | OXYGEN SATURATION: 98 % | WEIGHT: 149.06 LBS | TEMPERATURE: 97 F | BODY MASS INDEX: 23.95 KG/M2 | RESPIRATION RATE: 17 BRPM | DIASTOLIC BLOOD PRESSURE: 55 MMHG | SYSTOLIC BLOOD PRESSURE: 110 MMHG | HEIGHT: 66 IN

## 2022-06-03 DIAGNOSIS — R53.1 WEAKNESS: ICD-10-CM

## 2022-06-03 DIAGNOSIS — K29.70 GASTRITIS, PRESENCE OF BLEEDING UNSPECIFIED, UNSPECIFIED CHRONICITY, UNSPECIFIED GASTRITIS TYPE: Primary | ICD-10-CM

## 2022-06-03 DIAGNOSIS — R11.0 NAUSEA: ICD-10-CM

## 2022-06-03 LAB — GLUCOSE SERPL-MCNC: 108 MG/DL (ref 70–110)

## 2022-06-03 PROCEDURE — 99214 OFFICE O/P EST MOD 30 MIN: CPT | Mod: S$GLB,,, | Performed by: PHYSICIAN ASSISTANT

## 2022-06-03 PROCEDURE — 82962 GLUCOSE BLOOD TEST: CPT | Mod: S$GLB,,, | Performed by: PHYSICIAN ASSISTANT

## 2022-06-03 PROCEDURE — 82962 POCT GLUCOSE, HAND-HELD DEVICE: ICD-10-PCS | Mod: S$GLB,,, | Performed by: PHYSICIAN ASSISTANT

## 2022-06-03 PROCEDURE — 85025 COMPLETE CBC W/AUTO DIFF WBC: CPT | Performed by: PHYSICIAN ASSISTANT

## 2022-06-03 PROCEDURE — 99214 PR OFFICE/OUTPT VISIT, EST, LEVL IV, 30-39 MIN: ICD-10-PCS | Mod: S$GLB,,, | Performed by: PHYSICIAN ASSISTANT

## 2022-06-03 RX ORDER — ONDANSETRON 4 MG/1
4 TABLET, ORALLY DISINTEGRATING ORAL EVERY 8 HOURS PRN
Qty: 12 TABLET | Refills: 0 | Status: SHIPPED | OUTPATIENT
Start: 2022-06-03 | End: 2022-06-07

## 2022-06-03 RX ORDER — OMEPRAZOLE 40 MG/1
40 CAPSULE, DELAYED RELEASE ORAL DAILY
Qty: 30 CAPSULE | Refills: 1 | Status: SHIPPED | OUTPATIENT
Start: 2022-06-03 | End: 2022-08-24 | Stop reason: SDUPTHER

## 2022-06-03 NOTE — PATIENT INSTRUCTIONS
Take prescription PRILOSEC daily. Zofran up to 3x daily for nausea - dissolves on the tongue. Drink plenty of fluids.   Avoid fatty, greasy, fried, and spicy foods. Eat smaller, more frequent meals. Avoid alcohol and smoking. Avoid NSAIDS (ibuprofen, naproxen) and aspirin. Avoid laying down for 2 hours after eating.     We have obtained labwork to check electrolytes, kidney function, pancreatitic function and thyroid function. We will contact you with results when available.    fI your symptoms continue and you have not noticed an improvement after 3-4 weeks of lifestyle adjustments and the medications, please make sure to follow up with your primary care doctor or a gastroenterologist.

## 2022-06-03 NOTE — PROGRESS NOTES
"Subjective:       Patient ID: Joyce Marino is a 76 y.o. female.    Vitals:  height is 5' 6" (1.676 m) and weight is 67.6 kg (149 lb 0.5 oz). Her tympanic temperature is 97.1 °F (36.2 °C). Her blood pressure is 110/55 (abnormal) and her pulse is 69. Her respiration is 17 and oxygen saturation is 98%.     Chief Complaint: Nausea    Patient is a 76 year old female with hx of HTN and hyperlipidemia who presents to Urgent Care complaining of Nausea and Weakness that began approx 3 weeks ago. Patient states she feels very hungry but each time she attempts to eat, she has episodes of nausea, indigestion, and belching. Patient states that the nausea is constant. Patient states she is now beginning to feel weak. Patient states that her last real meal was approx 2 days ago. Yesterday, she had a muffin and a couple cheese sticks.Today, she has only eaten two leandra kisses. Patient denies any pain currently. Indigestion does awaken her at night. Patient states she is usually a very active person, but, lately seems to not be able to do her normal activities. Patient states she was experiencing a stressful situation around the time this began but has since resolved that. Patient notes she has been drinking Gatorade. Patient states she has taken OTC Tagamet, Pepto and Gas X, none of which has relieved any of her symptoms. No fever, vomiting, diarrhea, constipation or changes in bowel habits. No urinary complaints. No weight loss    Does take meloxicam and aspirin occasionally for arthritis pain.    Nausea  This is a new problem. The current episode started 1 to 4 weeks ago. The problem occurs constantly. The problem has been unchanged. Associated symptoms include abdominal pain, fatigue and nausea. Pertinent negatives include no anorexia, arthralgias, change in bowel habit, chest pain, chills, congestion, coughing, diaphoresis, fever, headaches, joint swelling, myalgias, neck pain, numbness, rash, sore throat, swollen glands, " urinary symptoms, vertigo, visual change, vomiting or weakness. The symptoms are aggravated by stress. She has tried drinking for the symptoms. The treatment provided no relief.       Constitution: Positive for activity change, appetite change, fatigue and generalized weakness. Negative for chills, sweating and fever.   HENT: Negative for congestion and sore throat.    Neck: Negative for neck pain, neck stiffness and neck swelling.   Cardiovascular: Negative for chest pain, leg swelling and palpitations.   Eyes: Negative for eye itching and eye redness.   Respiratory: Negative for cough, sputum production and shortness of breath.    Gastrointestinal: Positive for abdominal pain, nausea and heartburn. Negative for vomiting, constipation, diarrhea, rectal bleeding and rectal pain.   Genitourinary: Negative for dysuria, frequency, urgency, urine decreased and hematuria.   Musculoskeletal: Negative for joint pain, joint swelling and muscle ache.   Skin: Negative for rash.   Neurological: Negative for history of vertigo, headaches and numbness.       Objective:      Physical Exam   Constitutional: She is oriented to person, place, and time. She appears well-developed.   HENT:   Head: Normocephalic and atraumatic.   Ears:   Right Ear: External ear normal.   Left Ear: External ear normal.   Nose: Nose normal.   Mouth/Throat: Mucous membranes are normal.   Eyes: Conjunctivae and lids are normal.   Neck: Trachea normal. Neck supple.   Cardiovascular: Normal rate, regular rhythm and normal heart sounds.   Pulmonary/Chest: Effort normal and breath sounds normal. No respiratory distress.   Abdominal: Normal appearance and bowel sounds are normal. She exhibits no distension, no abdominal bruit, no pulsatile midline mass and no mass. Soft. There is no abdominal tenderness. There is no rebound, no guarding, no left CVA tenderness and no right CVA tenderness. No hernia.   Musculoskeletal: Normal range of motion.         General:  Normal range of motion.   Neurological: She is alert and oriented to person, place, and time. She has normal strength.   Skin: Skin is warm, dry, intact, not diaphoretic and not pale.   Psychiatric: Her speech is normal and behavior is normal. Judgment and thought content normal.   Nursing note and vitals reviewed.        Results for orders placed or performed in visit on 06/03/22   POCT Glucose, Hand-Held Device   Result Value Ref Range    POC Glucose 108 70 - 110 MG/DL       Assessment:       1. Gastritis, presence of bleeding unspecified, unspecified chronicity, unspecified gastritis type    2. Nausea    3. Weakness          Plan:       Start on PPI for symptoms consistent with gastritis. Also zofran prn. Sent outpatient labwork to r/o dehydration, check LFTs, pancreatic, kidney fxn. Also TSH for fatigue and weakness. Will need phone call when resulted.     Do recommend f/u with PCP for any persistent symptoms.     Gastritis, presence of bleeding unspecified, unspecified chronicity, unspecified gastritis type  -     omeprazole (PRILOSEC) 40 MG capsule; Take 1 capsule (40 mg total) by mouth once daily.  Dispense: 30 capsule; Refill: 1    Nausea  -     CBC W/ AUTO DIFFERENTIAL; Future; Expected date: 06/03/2022  -     COMPREHENSIVE METABOLIC PANEL; Future; Expected date: 06/03/2022  -     LIPASE; Future; Expected date: 06/03/2022  -     TSH; Future; Expected date: 06/03/2022  -     omeprazole (PRILOSEC) 40 MG capsule; Take 1 capsule (40 mg total) by mouth once daily.  Dispense: 30 capsule; Refill: 1  -     ondansetron (ZOFRAN-ODT) 4 MG TbDL; Take 1 tablet (4 mg total) by mouth every 8 (eight) hours as needed (nausea/vomiting).  Dispense: 12 tablet; Refill: 0    Weakness  -     POCT Glucose, Hand-Held Device  -     CBC W/ AUTO DIFFERENTIAL; Future; Expected date: 06/03/2022  -     COMPREHENSIVE METABOLIC PANEL; Future; Expected date: 06/03/2022  -     LIPASE; Future; Expected date: 06/03/2022  -     TSH; Future;  Expected date: 06/03/2022    Other orders  -     Cancel: Comprehensive metabolic panel    Neela Davidson PA-C  [facility]         Patient Instructions   Take prescription PRILOSEC daily. Zofran up to 3x daily for nausea - dissolves on the tongue. Drink plenty of fluids.   Avoid fatty, greasy, fried, and spicy foods. Eat smaller, more frequent meals. Avoid alcohol and smoking. Avoid NSAIDS (ibuprofen, naproxen) and aspirin. Avoid laying down for 2 hours after eating.     We have obtained labwork to check electrolytes, kidney function, pancreatitic function and thyroid function. We will contact you with results when available.    fI your symptoms continue and you have not noticed an improvement after 3-4 weeks of lifestyle adjustments and the medications, please make sure to follow up with your primary care doctor or a gastroenterologist.       '

## 2022-06-04 ENCOUNTER — HOSPITAL ENCOUNTER (EMERGENCY)
Facility: HOSPITAL | Age: 77
Discharge: HOME | End: 2022-06-04
Attending: EMERGENCY MEDICINE
Payer: MEDICARE

## 2022-06-04 ENCOUNTER — TELEPHONE (OUTPATIENT)
Dept: URGENT CARE | Facility: CLINIC | Age: 77
End: 2022-06-04
Payer: MEDICARE

## 2022-06-04 VITALS
TEMPERATURE: 98 F | OXYGEN SATURATION: 98 % | HEIGHT: 66 IN | RESPIRATION RATE: 20 BRPM | HEART RATE: 76 BPM | BODY MASS INDEX: 22.46 KG/M2 | WEIGHT: 139.75 LBS | DIASTOLIC BLOOD PRESSURE: 63 MMHG | SYSTOLIC BLOOD PRESSURE: 136 MMHG

## 2022-06-04 DIAGNOSIS — D64.9 SYMPTOMATIC ANEMIA: Primary | ICD-10-CM

## 2022-06-04 DIAGNOSIS — E87.6 HYPOKALEMIA: ICD-10-CM

## 2022-06-04 DIAGNOSIS — Z51.89 ENCOUNTER FOR BLOOD TRANSFUSION: ICD-10-CM

## 2022-06-04 LAB
ABO + RH BLD: NORMAL
ALBUMIN SERPL BCP-MCNC: 3.5 G/DL (ref 3.5–5.2)
ALP SERPL-CCNC: 81 U/L (ref 55–135)
ALT SERPL W/O P-5'-P-CCNC: 12 U/L (ref 10–44)
ANION GAP SERPL CALC-SCNC: 12 MMOL/L (ref 8–16)
ANISOCYTOSIS BLD QL SMEAR: SLIGHT
AST SERPL-CCNC: 13 U/L (ref 10–40)
BASOPHILS # BLD AUTO: 0.04 K/UL (ref 0–0.2)
BASOPHILS # BLD AUTO: 0.06 K/UL (ref 0–0.2)
BASOPHILS NFR BLD: 0.4 % (ref 0–1.9)
BASOPHILS NFR BLD: 0.8 % (ref 0–1.9)
BILIRUB SERPL-MCNC: 0.7 MG/DL (ref 0.1–1)
BLD GP AB SCN CELLS X3 SERPL QL: NORMAL
BLD PROD TYP BPU: NORMAL
BLD PROD TYP BPU: NORMAL
BLOOD UNIT EXPIRATION DATE: NORMAL
BLOOD UNIT EXPIRATION DATE: NORMAL
BLOOD UNIT TYPE CODE: 6200
BLOOD UNIT TYPE CODE: 6200
BLOOD UNIT TYPE: NORMAL
BLOOD UNIT TYPE: NORMAL
BUN SERPL-MCNC: 19 MG/DL (ref 8–23)
CALCIUM SERPL-MCNC: 9.8 MG/DL (ref 8.7–10.5)
CHLORIDE SERPL-SCNC: 102 MMOL/L (ref 95–110)
CO2 SERPL-SCNC: 27 MMOL/L (ref 23–29)
CODING SYSTEM: NORMAL
CODING SYSTEM: NORMAL
CREAT SERPL-MCNC: 0.7 MG/DL (ref 0.5–1.4)
DIFFERENTIAL METHOD: ABNORMAL
DIFFERENTIAL METHOD: ABNORMAL
DISPENSE STATUS: NORMAL
DISPENSE STATUS: NORMAL
EOSINOPHIL # BLD AUTO: 0.2 K/UL (ref 0–0.5)
EOSINOPHIL # BLD AUTO: 0.3 K/UL (ref 0–0.5)
EOSINOPHIL NFR BLD: 2 % (ref 0–8)
EOSINOPHIL NFR BLD: 3.6 % (ref 0–8)
ERYTHROCYTE [DISTWIDTH] IN BLOOD BY AUTOMATED COUNT: 14 % (ref 11.5–14.5)
ERYTHROCYTE [DISTWIDTH] IN BLOOD BY AUTOMATED COUNT: 14.3 % (ref 11.5–14.5)
EST. GFR  (AFRICAN AMERICAN): >60 ML/MIN/1.73 M^2
EST. GFR  (NON AFRICAN AMERICAN): >60 ML/MIN/1.73 M^2
FERRITIN SERPL-MCNC: 10 NG/ML (ref 20–300)
FOLATE SERPL-MCNC: 12.5 NG/ML (ref 4–24)
GLUCOSE SERPL-MCNC: 110 MG/DL (ref 70–110)
HCT VFR BLD AUTO: 17.7 % (ref 37–48.5)
HCT VFR BLD AUTO: 20.1 % (ref 37–48.5)
HGB BLD-MCNC: 5.7 G/DL (ref 12–16)
HGB BLD-MCNC: 6.5 G/DL (ref 12–16)
HYPOCHROMIA BLD QL SMEAR: ABNORMAL
IMM GRANULOCYTES # BLD AUTO: 0.03 K/UL (ref 0–0.04)
IMM GRANULOCYTES # BLD AUTO: 0.06 K/UL (ref 0–0.04)
IMM GRANULOCYTES NFR BLD AUTO: 0.4 % (ref 0–0.5)
IMM GRANULOCYTES NFR BLD AUTO: 0.6 % (ref 0–0.5)
IRON SERPL-MCNC: 95 UG/DL (ref 30–160)
LYMPHOCYTES # BLD AUTO: 1.7 K/UL (ref 1–4.8)
LYMPHOCYTES # BLD AUTO: 1.8 K/UL (ref 1–4.8)
LYMPHOCYTES NFR BLD: 17.9 % (ref 18–48)
LYMPHOCYTES NFR BLD: 24.9 % (ref 18–48)
MCH RBC QN AUTO: 27.8 PG (ref 27–31)
MCH RBC QN AUTO: 28.1 PG (ref 27–31)
MCHC RBC AUTO-ENTMCNC: 32.2 G/DL (ref 32–36)
MCHC RBC AUTO-ENTMCNC: 32.3 G/DL (ref 32–36)
MCV RBC AUTO: 86 FL (ref 82–98)
MCV RBC AUTO: 87 FL (ref 82–98)
MONOCYTES # BLD AUTO: 0.6 K/UL (ref 0.3–1)
MONOCYTES # BLD AUTO: 0.9 K/UL (ref 0.3–1)
MONOCYTES NFR BLD: 7.6 % (ref 4–15)
MONOCYTES NFR BLD: 9.2 % (ref 4–15)
NEUTROPHILS # BLD AUTO: 4.5 K/UL (ref 1.8–7.7)
NEUTROPHILS # BLD AUTO: 6.7 K/UL (ref 1.8–7.7)
NEUTROPHILS NFR BLD: 62.7 % (ref 38–73)
NEUTROPHILS NFR BLD: 69.9 % (ref 38–73)
NRBC BLD-RTO: 0 /100 WBC
NRBC BLD-RTO: 0 /100 WBC
NUM UNITS TRANS PACKED RBC: NORMAL
NUM UNITS TRANS PACKED RBC: NORMAL
PLATELET # BLD AUTO: 322 K/UL (ref 150–450)
PLATELET # BLD AUTO: 341 K/UL (ref 150–450)
PLATELET BLD QL SMEAR: ABNORMAL
PMV BLD AUTO: 11 FL (ref 9.2–12.9)
PMV BLD AUTO: 9.8 FL (ref 9.2–12.9)
POIKILOCYTOSIS BLD QL SMEAR: SLIGHT
POLYCHROMASIA BLD QL SMEAR: ABNORMAL
POTASSIUM SERPL-SCNC: 3.1 MMOL/L (ref 3.5–5.1)
PROT SERPL-MCNC: 6.1 G/DL (ref 6–8.4)
RBC # BLD AUTO: 2.05 M/UL (ref 4–5.4)
RBC # BLD AUTO: 2.31 M/UL (ref 4–5.4)
SARS-COV-2 RDRP RESP QL NAA+PROBE: NEGATIVE
SATURATED IRON: 21 % (ref 20–50)
SODIUM SERPL-SCNC: 141 MMOL/L (ref 136–145)
STOMATOCYTES BLD QL SMEAR: PRESENT
TARGETS BLD QL SMEAR: ABNORMAL
TOTAL IRON BINDING CAPACITY: 450 UG/DL (ref 250–450)
TRANSFERRIN SERPL-MCNC: 304 MG/DL (ref 200–375)
VIT B12 SERPL-MCNC: 732 PG/ML (ref 210–950)
WBC # BLD AUTO: 7.23 K/UL (ref 3.9–12.7)
WBC # BLD AUTO: 9.56 K/UL (ref 3.9–12.7)

## 2022-06-04 PROCEDURE — 99291 CRITICAL CARE FIRST HOUR: CPT | Mod: 25

## 2022-06-04 PROCEDURE — 82607 VITAMIN B-12: CPT | Performed by: EMERGENCY MEDICINE

## 2022-06-04 PROCEDURE — 82746 ASSAY OF FOLIC ACID SERUM: CPT | Performed by: EMERGENCY MEDICINE

## 2022-06-04 PROCEDURE — P9016 RBC LEUKOCYTES REDUCED: HCPCS | Performed by: EMERGENCY MEDICINE

## 2022-06-04 PROCEDURE — 84466 ASSAY OF TRANSFERRIN: CPT | Performed by: EMERGENCY MEDICINE

## 2022-06-04 PROCEDURE — 86850 RBC ANTIBODY SCREEN: CPT | Performed by: EMERGENCY MEDICINE

## 2022-06-04 PROCEDURE — 82728 ASSAY OF FERRITIN: CPT | Performed by: EMERGENCY MEDICINE

## 2022-06-04 PROCEDURE — 85025 COMPLETE CBC W/AUTO DIFF WBC: CPT | Performed by: EMERGENCY MEDICINE

## 2022-06-04 PROCEDURE — 36430 TRANSFUSION BLD/BLD COMPNT: CPT

## 2022-06-04 PROCEDURE — 86920 COMPATIBILITY TEST SPIN: CPT | Performed by: EMERGENCY MEDICINE

## 2022-06-04 PROCEDURE — U0002 COVID-19 LAB TEST NON-CDC: HCPCS | Performed by: EMERGENCY MEDICINE

## 2022-06-04 PROCEDURE — 36415 COLL VENOUS BLD VENIPUNCTURE: CPT | Performed by: PHYSICIAN ASSISTANT

## 2022-06-04 PROCEDURE — 80053 COMPREHEN METABOLIC PANEL: CPT | Performed by: EMERGENCY MEDICINE

## 2022-06-04 PROCEDURE — 86900 BLOOD TYPING SEROLOGIC ABO: CPT | Performed by: EMERGENCY MEDICINE

## 2022-06-04 RX ORDER — HYDROCODONE BITARTRATE AND ACETAMINOPHEN 500; 5 MG/1; MG/1
TABLET ORAL
Status: DISCONTINUED | OUTPATIENT
Start: 2022-06-04 | End: 2022-06-04 | Stop reason: HOSPADM

## 2022-06-04 NOTE — ED PROVIDER NOTES
SCRIBE #1 NOTE: I, Adalid Ramirez, am scribing for, and in the presence of, Hannah Dai MD. I have scribed the entire note.       History     Chief Complaint   Patient presents with    Transfusion     Needs blood transfusion, H & H has dropped below 7.     Review of patient's allergies indicates:  No Known Allergies      History of Present Illness     HPI    6/4/2022, 1:50 PM  History obtained from the patient      History of Present Illness: Joyce Marino is a 76 y.o. female patient with a PMHx of HTN,  who presents to the Emergency Department for evaluation of a tranusion. Pt sees Dr. Porter Vasques MD. St states they went to the doctor yesterday and had blood lab completed. Pt states the doctor called today and told pt that her blood count was low (6.4 H&H) and to come to the ED for transfusion. Pt is Rx meloxicam and tries to not take it daily but does use it roughly 5 days out of the week. Symptoms are constant and moderate in severity. No mitigating or exacerbating factors reported. Associated sxs include tiredness and nausea after eating. Patient denies any black stool, bloody stool, dizziness, SOB, vomiting, diarrhea and abdominal pain, and all other sxs at this time. No further complaints or concerns at this time.       Arrival mode: Personal vehicle      PCP: Porter Vasques MD        Past Medical History:  Past Medical History:   Diagnosis Date    Arthritis     Asthma     Hyperlipidemia     Hypertension        Past Surgical History:  Past Surgical History:   Procedure Laterality Date    HYSTERECTOMY  1992    for fibroids    OOPHORECTOMY      BSO at time of hysterectomy    michael in right leg           Family History:  Family History   Problem Relation Age of Onset    Heart disease Mother     Emphysema Mother     Heart disease Father     Lung cancer Maternal Grandfather        Social History:  Social History     Tobacco Use    Smoking status: Never Smoker    Smokeless tobacco: Never Used    Substance and Sexual Activity    Alcohol use: No    Drug use: No    Sexual activity: Never     Partners: Male     Birth control/protection: None        Review of Systems     Review of Systems   Constitutional: Negative for fever.   HENT: Negative for sore throat.    Respiratory: Negative for shortness of breath.    Cardiovascular: Negative for chest pain.   Gastrointestinal: Positive for nausea (after eating). Negative for abdominal pain, blood in stool, diarrhea and vomiting.        (-) black stool   Genitourinary: Negative for dysuria.   Musculoskeletal: Negative for back pain.   Skin: Negative for rash.   Neurological: Negative for dizziness and weakness.        (+) tiredness   Hematological: Does not bruise/bleed easily.   All other systems reviewed and are negative.       Physical Exam     Initial Vitals   BP Pulse Resp Temp SpO2   06/04/22 1335 06/04/22 1335 06/04/22 1335 06/04/22 1337 06/04/22 1335   (!) 115/57 94 20 98.2 °F (36.8 °C) 97 %      MAP       --                 Physical Exam  Nursing Notes and Vital Signs Reviewed.  Constitutional: Patient is in no apparent distress. Patient is visually pale.  Head: Atraumatic. Normocephalic.  Eyes: PERRL. EOM intact. Conjunctivae are pale. No scleral icterus.  ENT: Mucous membranes are moist. Oropharynx is clear and symmetric.    Neck: Supple. Full ROM. No lymphadenopathy.  Cardiovascular: Regular rate. Regular rhythm. No murmurs, rubs, or gallops. Distal pulses are 2+ and symmetric.  Pulmonary/Chest: No respiratory distress. Clear to auscultation bilaterally. No wheezing or rales.  Abdominal: Soft and non-distended.  There is no tenderness.  No rebound, guarding, or rigidity. Good bowel sounds.  Musculoskeletal: Moves all extremities. No obvious deformities. No edema. No calf tenderness.  Skin: Warm and dry.  Neurological:  Alert, awake, and appropriate.  Normal speech.  No acute focal neurological deficits are appreciated.  Psychiatric: Normal affect. Good  "eye contact. Appropriate in content.     ED Course   Critical Care    Date/Time: 6/4/2022 3:21 PM  Performed by: Hannah Dai MD  Authorized by: Hannah Dai MD   Direct patient critical care time: 25 minutes  Additional history critical care time: 8 minutes  Ordering / reviewing critical care time: 8 minutes  Documentation critical care time: 5 minutes  Total critical care time (exclusive of procedural time) : 46 minutes  Critical care was necessary to treat or prevent imminent or life-threatening deterioration of the following conditions: symptomatic anemia.        ED Vital Signs:  Vitals:    06/04/22 1335 06/04/22 1337 06/04/22 1502   BP: (!) 115/57  123/61   Pulse: 94  72   Resp: 20  19   Temp:  98.2 °F (36.8 °C)    TempSrc:  Oral    SpO2: 97%  100%   Weight: 63.4 kg (139 lb 12.4 oz)     Height: 5' 6" (1.676 m)         Abnormal Lab Results:  Labs Reviewed   CBC W/ AUTO DIFFERENTIAL - Abnormal; Notable for the following components:       Result Value    RBC 2.05 (*)     Hemoglobin 5.7 (*)     Hematocrit 17.7 (*)     Immature Granulocytes 0.6 (*)     Immature Grans (Abs) 0.06 (*)     Lymph % 17.9 (*)     All other components within normal limits    Narrative:        HGB & HCT critical result(s) called and verbal readback obtained   from CELIA ALVAREZ by [facility] 06/04/2022 14:28   COMPREHENSIVE METABOLIC PANEL - Abnormal; Notable for the following components:    Potassium 3.1 (*)     All other components within normal limits   FERRITIN - Abnormal; Notable for the following components:    Ferritin 10 (*)     All other components within normal limits   SARS-COV-2 RNA AMPLIFICATION, QUAL   IRON AND TIBC   VITAMIN B12   FOLATE   OCCULT BLOOD X 1, STOOL   TYPE & SCREEN   PREPARE RBC SOFT        All Lab Results:  Results for orders placed or performed during the hospital encounter of 06/04/22   CBC auto differential   Result Value Ref Range    WBC 9.56 3.90 - 12.70 K/uL    RBC 2.05 (L) 4.00 - 5.40 M/uL    Hemoglobin " 5.7 (LL) 12.0 - 16.0 g/dL    Hematocrit 17.7 (LL) 37.0 - 48.5 %    MCV 86 82 - 98 fL    MCH 27.8 27.0 - 31.0 pg    MCHC 32.2 32.0 - 36.0 g/dL    RDW 14.0 11.5 - 14.5 %    Platelets 341 150 - 450 K/uL    MPV 9.8 9.2 - 12.9 fL    Immature Granulocytes 0.6 (H) 0.0 - 0.5 %    Gran # (ANC) 6.7 1.8 - 7.7 K/uL    Immature Grans (Abs) 0.06 (H) 0.00 - 0.04 K/uL    Lymph # 1.7 1.0 - 4.8 K/uL    Mono # 0.9 0.3 - 1.0 K/uL    Eos # 0.2 0.0 - 0.5 K/uL    Baso # 0.04 0.00 - 0.20 K/uL    nRBC 0 0 /100 WBC    Gran % 69.9 38.0 - 73.0 %    Lymph % 17.9 (L) 18.0 - 48.0 %    Mono % 9.2 4.0 - 15.0 %    Eosinophil % 2.0 0.0 - 8.0 %    Basophil % 0.4 0.0 - 1.9 %    Platelet Estimate Appears normal     Aniso Slight     Poik Slight     Poly Moderate     Hypo Occasional     Target Cells Occasional     Stomatocytes Present     Differential Method Automated    Comprehensive metabolic panel   Result Value Ref Range    Sodium 141 136 - 145 mmol/L    Potassium 3.1 (L) 3.5 - 5.1 mmol/L    Chloride 102 95 - 110 mmol/L    CO2 27 23 - 29 mmol/L    Glucose 110 70 - 110 mg/dL    BUN 19 8 - 23 mg/dL    Creatinine 0.7 0.5 - 1.4 mg/dL    Calcium 9.8 8.7 - 10.5 mg/dL    Total Protein 6.1 6.0 - 8.4 g/dL    Albumin 3.5 3.5 - 5.2 g/dL    Total Bilirubin 0.7 0.1 - 1.0 mg/dL    Alkaline Phosphatase 81 55 - 135 U/L    AST 13 10 - 40 U/L    ALT 12 10 - 44 U/L    Anion Gap 12 8 - 16 mmol/L    eGFR if African American >60 >60 mL/min/1.73 m^2    eGFR if non African American >60 >60 mL/min/1.73 m^2   Ferritin   Result Value Ref Range    Ferritin 10 (L) 20.0 - 300.0 ng/mL   COVID-19 Rapid Screening   Result Value Ref Range    SARS-CoV-2 RNA, Amplification, Qual Negative Negative   Type & Screen   Result Value Ref Range    Group & Rh A POS     Indirect Rk NEG          Imaging Results:  Imaging Results    None                   The Emergency Provider reviewed the vital signs and test results, which are outlined above.     ED Discussion       3:50 PM: Reassessed pt at  this time, no active bleeding at present, given referral for hematology and gi, avoid nsaid use. Discussed with pt all pertinent ED information and results. Discussed pt dx and plan of tx. Gave pt all f/u and return to the ED instructions. All questions and concerns were addressed at this time. Pt expresses understanding of information and instructions, and is comfortable with plan to discharge. Pt is stable for discharge.    I discussed with patient and/or family/caretaker that evaluation in the ED does not suggest any emergent or life threatening medical conditions requiring immediate intervention beyond what was provided in the ED, and I believe patient is safe for discharge.  Regardless, an unremarkable evaluation in the ED does not preclude the development or presence of a serious of life threatening condition. As such, patient was instructed to return immediately for any worsening or change in current symptoms.         Medical Decision Making:   Clinical Tests:   Lab Tests: Ordered and Reviewed           ED Medication(s):  Medications   0.9%  NaCl infusion (for blood administration) (has no administration in time range)       Current Discharge Medication List           Follow-up Information     Porter Vasques MD. Schedule an appointment as soon as possible for a visit in 2 days.    Specialty: Family Medicine  Contact information:  [address] [address] [address]  [phone]             [facility] GASTROENTEROLOGY. Schedule an appointment as soon as possible for a visit in 2 days.    Specialty: Gastroenterology  Contact information:  [id number] [place] [address]  [phone]           [facility] HEMATOLOGY/ONCOLOGY. Schedule an appointment as soon as possible for a visit in 2 days.    Specialty: Hematology and Oncology  Contact information:  [id number] [place] [address]  [phone]                           Scribe Attestation:   Scribe #1: I  performed the above scribed service and the documentation accurately describes the services I performed. I attest to the accuracy of the note.     Attending:   Physician Attestation Statement for Scribe #1: I, Hannah Dai MD, personally performed the services described in this documentation, as scribed by Adalid Ramirez, in my presence, and it is both accurate and complete.           Clinical Impression       ICD-10-CM ICD-9-CM   1. Symptomatic anemia  D64.9 285.9   2. Encounter for blood transfusion  Z51.89 V58.2   3. Hypokalemia  E87.6 276.8       Disposition:   Disposition: Discharged  Condition: Stable         Hannah Dai MD  06/04/22 1600       Hannah Dai MD  06/04/22 1602

## 2022-06-04 NOTE — TELEPHONE ENCOUNTER
2nd attempt. Was able to speak with patient. Discussed having her go to [facility] on [address] for evaluation and tx of severe symptomatic anemia. Pt states she will have her sister drive her there.

## 2022-06-04 NOTE — DISCHARGE INSTRUCTIONS
Please stop taking NSAIDS like Mobic, Excedrine, please follow up closely outpatient with the Gastroenterology Department and hematology department

## 2022-06-10 ENCOUNTER — LAB VISIT (OUTPATIENT)
Dept: LAB | Facility: HOSPITAL | Age: 77
End: 2022-06-10
Attending: PHYSICIAN ASSISTANT
Payer: COMMERCIAL

## 2022-06-10 ENCOUNTER — TELEPHONE (OUTPATIENT)
Dept: GASTROENTEROLOGY | Facility: CLINIC | Age: 77
End: 2022-06-10
Payer: MEDICARE

## 2022-06-10 ENCOUNTER — OFFICE VISIT (OUTPATIENT)
Dept: GASTROENTEROLOGY | Facility: CLINIC | Age: 77
End: 2022-06-10
Payer: COMMERCIAL

## 2022-06-10 ENCOUNTER — TELEPHONE (OUTPATIENT)
Dept: GASTROENTEROLOGY | Facility: CLINIC | Age: 77
End: 2022-06-10

## 2022-06-10 VITALS
SYSTOLIC BLOOD PRESSURE: 120 MMHG | OXYGEN SATURATION: 96 % | DIASTOLIC BLOOD PRESSURE: 70 MMHG | WEIGHT: 140 LBS | HEIGHT: 66 IN | BODY MASS INDEX: 22.5 KG/M2 | HEART RATE: 81 BPM

## 2022-06-10 DIAGNOSIS — D64.9 SYMPTOMATIC ANEMIA: ICD-10-CM

## 2022-06-10 DIAGNOSIS — I35.0 NONRHEUMATIC AORTIC VALVE STENOSIS: ICD-10-CM

## 2022-06-10 DIAGNOSIS — D64.9 SYMPTOMATIC ANEMIA: Primary | ICD-10-CM

## 2022-06-10 LAB
BASOPHILS # BLD AUTO: 0.06 K/UL (ref 0–0.2)
BASOPHILS NFR BLD: 0.8 % (ref 0–1.9)
DIFFERENTIAL METHOD: ABNORMAL
EOSINOPHIL # BLD AUTO: 0.3 K/UL (ref 0–0.5)
EOSINOPHIL NFR BLD: 3.4 % (ref 0–8)
ERYTHROCYTE [DISTWIDTH] IN BLOOD BY AUTOMATED COUNT: 13.4 % (ref 11.5–14.5)
HCT VFR BLD AUTO: 29.5 % (ref 37–48.5)
HGB BLD-MCNC: 9.1 G/DL (ref 12–16)
IMM GRANULOCYTES # BLD AUTO: 0.02 K/UL (ref 0–0.04)
IMM GRANULOCYTES NFR BLD AUTO: 0.3 % (ref 0–0.5)
LYMPHOCYTES # BLD AUTO: 1.1 K/UL (ref 1–4.8)
LYMPHOCYTES NFR BLD: 14.3 % (ref 18–48)
MCH RBC QN AUTO: 28 PG (ref 27–31)
MCHC RBC AUTO-ENTMCNC: 30.8 G/DL (ref 32–36)
MCV RBC AUTO: 91 FL (ref 82–98)
MONOCYTES # BLD AUTO: 0.8 K/UL (ref 0.3–1)
MONOCYTES NFR BLD: 10.4 % (ref 4–15)
NEUTROPHILS # BLD AUTO: 5.3 K/UL (ref 1.8–7.7)
NEUTROPHILS NFR BLD: 70.8 % (ref 38–73)
NRBC BLD-RTO: 0 /100 WBC
PLATELET # BLD AUTO: 425 K/UL (ref 150–450)
PMV BLD AUTO: 10 FL (ref 9.2–12.9)
RBC # BLD AUTO: 3.25 M/UL (ref 4–5.4)
WBC # BLD AUTO: 7.41 K/UL (ref 3.9–12.7)

## 2022-06-10 PROCEDURE — 99999 PR PBB SHADOW E&M-EST. PATIENT-LVL IV: ICD-10-PCS | Mod: PBBFAC,,, | Performed by: PHYSICIAN ASSISTANT

## 2022-06-10 PROCEDURE — 85025 COMPLETE CBC W/AUTO DIFF WBC: CPT | Performed by: PHYSICIAN ASSISTANT

## 2022-06-10 PROCEDURE — 99999 PR PBB SHADOW E&M-EST. PATIENT-LVL IV: CPT | Mod: PBBFAC,,, | Performed by: PHYSICIAN ASSISTANT

## 2022-06-10 PROCEDURE — 99204 PR OFFICE/OUTPT VISIT, NEW, LEVL IV, 45-59 MIN: ICD-10-PCS | Mod: S$GLB,,, | Performed by: PHYSICIAN ASSISTANT

## 2022-06-10 PROCEDURE — 36415 COLL VENOUS BLD VENIPUNCTURE: CPT | Performed by: PHYSICIAN ASSISTANT

## 2022-06-10 PROCEDURE — 99204 OFFICE O/P NEW MOD 45 MIN: CPT | Mod: S$GLB,,, | Performed by: PHYSICIAN ASSISTANT

## 2022-06-10 RX ORDER — SODIUM, POTASSIUM,MAG SULFATES 17.5-3.13G
1 SOLUTION, RECONSTITUTED, ORAL ORAL DAILY
Qty: 1 KIT | Refills: 0 | Status: SHIPPED | OUTPATIENT
Start: 2022-06-10 | End: 2022-06-12

## 2022-06-10 NOTE — TELEPHONE ENCOUNTER
Patient scheduled for colonoscopy on 7/21/22. Murmur heard on exam. Echo was recommended in 2020 but was not done. Do you want patient to follow up and move forward with Echo prior to colonoscopy?

## 2022-06-10 NOTE — TELEPHONE ENCOUNTER
Location Screening:    If answers yes to either of the following, schedule at [facility] ONLY. If No, OK for either location.    1. Is procedure for esophageal banding (Varices)? no Yes, select [facility]  2. Is this an Advanced Endoscopic procedure (Dr Chu)?  no Yes, select [facility] (except for bariatric procedures - schedule those at the [facility])  3. Is the BMI > 50? no Yes, select [facility]  4. Does the patient have chronic hypoxic respiratory failure, as evidenced by symptoms below? no Yes, select [facility]  a. O2 Saturation <92%  b. Is the patient on supplemental O2  c. History of moderate to severe PFT's  d. Unable to complete a 6 min walk test    COVID Screening    1. Are you COVID vaccinated? no    2. Are you experiencing shortness of breath, cough, muscle aches, loss of taste or loss of smell?  no    If answered yes to #2 then the patient must seek medical attention with their PCP, urgent care or ED.  Do not schedule the procedure.       ENDO screening    1. Have you been admitted for cardiac, kidney or pulmonary causes to the hospital in the past 3 months? no   If yes, schedule an appointment with PCP before scheduling endoscopic procedure  Unless patient has been seen by Nephrology, Cardiologist or in the GI department within 3 months.      2. Have you had a stent placed in the last 12 months? no: If yes, have one of our providers review the chart and determine if they need to be seen in clinic.      3. Have you had a stroke or heart attack in the past 6 months? no If patient has not been seen by PCP, Neurology or Cardiology within 3 months, have one of our providers review the chart and determine if they need to be seen in clinic.        4. Have you had any chest pain in the past 3 months? no   If yes, have you been evaluated by your PCP and/or cardiologist and was it determined to not be heart related? not applicable   If No, Pt needs to be seen by PCP or Cardiologist.  Pt can be scheduled once cardiac clearance  "obtained by either of those providers.     5. Do you take prescription weight loss medications?  no   If yes, must stop weight loss medication for 2 weeks prior to procedure.     6. Have you been diagnosed with diverticulitis within the past 3 months? no   If yes, must have been seen by GI within the last 3 months, if not schedule with GI CINDY.    If Pt has been seen by GI, schedule procedure 8-12 weeks post antibiotic treatment.     7. Are you on Dialysis? no  If yes, schedule procedure for the day AFTER dialysis.  Appt time should be 9am or later, patient arrival time is 2 hours prior to procedure, for labs.  Nulytely or miralax prep must be used for all patients with kidney disease.     8. Are you diabetic?  no   If yes, schedule morning appt. Advise Pt to hold all diabetic meds day of procedure.     9. All patients 80 years of age and older must be seen in the GI clinic - please schedule an CINDY appointment - Do not schedule a scope procedure appointment.     10. Is patient on a "high risk" medication (blood thinner/antiplatelet agent)?  no   If yes, has cardiac clearance been obtained within the last 60 days? N/A   If no, a new cardiac clearance must be obtained.     Final Questions:    1.I have reviewed the last colonoscopy for recommendations regarding next procedure bowel prep.  yes  2. I have reviewed medications and allergies.  yes  3. I have verified the pharmacy information and appropriate prep sent if needed. yes  4. Prep instructions have been mailed or sent to portal per patient request. yes        All schedulers will have ability to reach out to the ordering GI provider to clarify any issues.       "

## 2022-06-10 NOTE — PROGRESS NOTES
"Clinic Consult:  [facility] Consultation Note    Reason for Consult:  The primary encounter diagnosis was Symptomatic anemia. A diagnosis of Nonrheumatic aortic valve stenosis was also pertinent to this visit.    PCP: Porter Vasques   No address on file    CC: Anemia      HPI:  This is a 76 y.o. female here for evaluation of the above. Patient reports she began with indigestion about 6 weeks ago. It began to progress and was accompanied by severe fatigue, dizziness and nausea. She reported to an urgent care who completed blood work. She was called the following day and instructed to go to the ER for blood transfusion, as her hemoglobin was 6.4. She went to the ED and was transfused 2 units and discharged with instructions to follow up with GI. She is here today with no real GI complaints. She was put on Omeprazole 40mg daily which she has been taking. Bowels are moving well with no overt bleeding. Reports one day of "darker stool" after taking her iron. She does not often look at her stools. States she has been taking Mobic almost daily for the past 2 years due to ankle pain. Last colonoscopy completed in 2009, but has had Fit Kits fairly regularly. No additional complaints.      Review of Systems   Constitutional: Positive for fatigue. Negative for activity change, appetite change, chills, diaphoresis, fever and unexpected weight change.   HENT: Negative for trouble swallowing.    Respiratory: Negative for cough and shortness of breath.    Cardiovascular: Negative for chest pain.   Gastrointestinal: Negative for abdominal distention, abdominal pain, anal bleeding, blood in stool, constipation, diarrhea, nausea and vomiting.   Genitourinary: Negative for dysuria and hematuria.   Musculoskeletal: Positive for arthralgias (ankle pain). Negative for back pain.   Skin: Negative for color change and pallor.   Neurological: Negative for dizziness, weakness and light-headedness.   Psychiatric/Behavioral: " Negative for dysphoric mood. The patient is not nervous/anxious.         Medical History:   Past Medical History:   Diagnosis Date    Arthritis     Asthma     Hyperlipidemia     Hypertension        Surgical History:   Past Surgical History:   Procedure Laterality Date    HYSTERECTOMY  1992    for fibroids    OOPHORECTOMY      BSO at time of hysterectomy    michael in right leg         Family History:    Family History   Problem Relation Age of Onset    Heart disease Mother     Emphysema Mother     Heart disease Father     Lung cancer Maternal Grandfather        Social History:   Social History     Socioeconomic History    Marital status: Single   Tobacco Use    Smoking status: Never Smoker    Smokeless tobacco: Never Used   Substance and Sexual Activity    Alcohol use: No    Drug use: No    Sexual activity: Never     Partners: Male     Birth control/protection: None       Allergies:   Review of patient's allergies indicates:  No Known Allergies    Home Medications:   Current Outpatient Medications on File Prior to Visit   Medication Sig Dispense Refill    ferrous sulfate 325 mg (65 mg iron) Tab tablet Take 1 tablet (325 mg total) by mouth daily with breakfast. 60 tablet 0    hydroCHLOROthiazide (HYDRODIURIL) 12.5 MG Tab TAKE 1 TABLET DAILY 90 tablet 3    KLOR-CON M20 20 mEq tablet TAKE 1 TABLET DAILY 90 tablet 3    losartan (COZAAR) 50 MG tablet TAKE 1 TABLET DAILY 90 tablet 3    omeprazole (PRILOSEC) 40 MG capsule Take 1 capsule (40 mg total) by mouth once daily. 30 capsule 1    glucosamine HCl-msm-chondroitn 750-375-400 mg Tab Take 1 tablet by mouth 2 (two) times daily.      Lactobacillus acidophilus 10 billion cell Cap Take 1 capsule by mouth once daily.      multivitamin with minerals tablet Take 1 tablet by mouth once daily.      traMADoL (ULTRAM) 50 mg tablet Take 1 tablet (50 mg total) by mouth every 6 (six) hours as needed for Pain. (Patient not taking: No sig reported) 30 tablet 3     "turmeric root extract 500 mg Cap Take 1 capsule by mouth 2 (two) times daily.       Current Facility-Administered Medications on File Prior to Visit   Medication Dose Route Frequency Provider Last Rate Last Admin    denosumab (PROLIA) injection 60 mg  60 mg Subcutaneous Q6 Months Yoon Pennington MD   60 mg at 19 0939       Physical Exam:  /70   Pulse 81   Ht 5' 6" (1.676 m)   Wt 63.5 kg (139 lb 15.9 oz)   SpO2 96%   BMI 22.60 kg/m²   Body mass index is 22.6 kg/m².  Physical Exam  Constitutional:       General: She is not in acute distress.     Appearance: Normal appearance. She is not ill-appearing, toxic-appearing or diaphoretic.   HENT:      Head: Normocephalic and atraumatic.   Eyes:      General: No scleral icterus.     Extraocular Movements: Extraocular movements intact.   Cardiovascular:      Rate and Rhythm: Normal rate and regular rhythm.   Pulmonary:      Effort: Pulmonary effort is normal. No respiratory distress.      Breath sounds: Normal breath sounds.   Abdominal:      General: Bowel sounds are normal. There is no distension.      Palpations: Abdomen is soft. There is no mass.      Tenderness: There is no abdominal tenderness. There is no guarding.   Musculoskeletal:         General: Normal range of motion.      Cervical back: Normal range of motion.   Skin:     General: Skin is warm and dry.      Coloration: Skin is not jaundiced or pale.      Findings: No erythema.   Neurological:      General: No focal deficit present.      Mental Status: She is alert and oriented to person, place, and time.   Psychiatric:         Behavior: Behavior normal.           Labs: Pertinent labs reviewed.  Endoscopy: none  CRC Screenin  Anticoagulation: none    Assessment:  1. Symptomatic anemia    2. Nonrheumatic aortic valve stenosis         Recommendations:  -Repeat CBC today to ensure upward trend of hemoglobin since recent transfusion.   -In need of EGD and colonoscopy. No overt " bleeding.  -Murmur heard on exam. Patient is aware and has been seen by cardiology 3 years ago. Echo was recommended for 2020. Will reach out to cardiology.     Symptomatic anemia  -     CBC Auto Differential; Future; Expected date: 06/10/2022  -     Case Request Endoscopy: COLONOSCOPY, EGD (ESOPHAGOGASTRODUODENOSCOPY)  -     sodium,potassium,mag sulfates (SUPREP BOWEL PREP KIT) 17.5-3.13-1.6 gram SolR; Take 177 mLs by mouth once daily. for 2 days  Dispense: 1 kit; Refill: 0    Nonrheumatic aortic valve stenosis        No follow-ups on file.    Thank you so much for allowing me to participate in the care of Joyce PJ Decker PA-C

## 2022-06-22 ENCOUNTER — OFFICE VISIT (OUTPATIENT)
Dept: CARDIOLOGY | Facility: CLINIC | Age: 77
End: 2022-06-22
Payer: MEDICARE

## 2022-06-22 ENCOUNTER — HOSPITAL ENCOUNTER (OUTPATIENT)
Dept: CARDIOLOGY | Facility: HOSPITAL | Age: 77
Discharge: HOME | End: 2022-06-22
Attending: INTERNAL MEDICINE
Payer: COMMERCIAL

## 2022-06-22 VITALS
SYSTOLIC BLOOD PRESSURE: 126 MMHG | BODY MASS INDEX: 22.39 KG/M2 | OXYGEN SATURATION: 97 % | WEIGHT: 139.31 LBS | HEIGHT: 66 IN | HEART RATE: 76 BPM | DIASTOLIC BLOOD PRESSURE: 66 MMHG

## 2022-06-22 DIAGNOSIS — Z76.89 ESTABLISHING CARE WITH NEW DOCTOR, ENCOUNTER FOR: ICD-10-CM

## 2022-06-22 DIAGNOSIS — I10 PRIMARY HYPERTENSION: ICD-10-CM

## 2022-06-22 DIAGNOSIS — I35.0 NONRHEUMATIC AORTIC VALVE STENOSIS: Primary | ICD-10-CM

## 2022-06-22 DIAGNOSIS — I35.1 NONRHEUMATIC AORTIC VALVE INSUFFICIENCY: ICD-10-CM

## 2022-06-22 DIAGNOSIS — Z78.9 STATIN INTOLERANCE: ICD-10-CM

## 2022-06-22 DIAGNOSIS — D64.9 ANEMIA, UNSPECIFIED TYPE: ICD-10-CM

## 2022-06-22 DIAGNOSIS — Z76.89 ESTABLISHING CARE WITH NEW DOCTOR, ENCOUNTER FOR: Primary | ICD-10-CM

## 2022-06-22 PROCEDURE — 93010 EKG 12-LEAD: ICD-10-PCS | Mod: ,,, | Performed by: INTERNAL MEDICINE

## 2022-06-22 PROCEDURE — 99999 PR PBB SHADOW E&M-EST. PATIENT-LVL IV: CPT | Mod: PBBFAC,,, | Performed by: INTERNAL MEDICINE

## 2022-06-22 PROCEDURE — 93010 ELECTROCARDIOGRAM REPORT: CPT | Mod: ,,, | Performed by: INTERNAL MEDICINE

## 2022-06-22 PROCEDURE — 99204 OFFICE O/P NEW MOD 45 MIN: CPT | Mod: S$GLB,,, | Performed by: INTERNAL MEDICINE

## 2022-06-22 PROCEDURE — 99204 PR OFFICE/OUTPT VISIT, NEW, LEVL IV, 45-59 MIN: ICD-10-PCS | Mod: S$GLB,,, | Performed by: INTERNAL MEDICINE

## 2022-06-22 PROCEDURE — 99999 PR PBB SHADOW E&M-EST. PATIENT-LVL IV: ICD-10-PCS | Mod: PBBFAC,,, | Performed by: INTERNAL MEDICINE

## 2022-06-22 PROCEDURE — 93005 ELECTROCARDIOGRAM TRACING: CPT

## 2022-06-22 NOTE — PROGRESS NOTES
Subjective:   Patient ID:  Joyce Marino is a 76 y.o. female who presents for evaluation of No chief complaint on file.      HPI   74-year-old female with history of hypertension, hyperlipidemia, mild aortic stenosis in 1st degree AV block.  She is a never smoker.  She was admitted last week to the emergency room received 2 units of blood found to be anemic with a hemoglobin of 5.4.  Supposed to go for GI scope.  She still denies any syncope, chest pain, dyspnea on exertion leg swelling or palpitations.  She feels much better since she had her 2 units of blood.  Her EKG is normal.  Her aortic stenosis still sounds mild to moderate on exam.  States that she is very active without any limitations.       Past Medical History:   Diagnosis Date    Arthritis     Asthma     Hyperlipidemia     Hypertension        Past Surgical History:   Procedure Laterality Date    HYSTERECTOMY  1992    for fibroids    OOPHORECTOMY      BSO at time of hysterectomy    michael in right leg         Social History     Tobacco Use    Smoking status: Never Smoker    Smokeless tobacco: Never Used   Substance Use Topics    Alcohol use: No    Drug use: No       Family History   Problem Relation Age of Onset    Heart disease Mother     Emphysema Mother     Heart disease Father     Lung cancer Maternal Grandfather        Review of Systems   Constitutional: Negative for fever and malaise/fatigue.   HENT: Negative for sore throat.    Eyes: Negative for blurred vision.   Cardiovascular: Negative for chest pain, claudication, cyanosis, dyspnea on exertion, irregular heartbeat, leg swelling, near-syncope, orthopnea, palpitations, paroxysmal nocturnal dyspnea and syncope.   Respiratory: Negative for cough and hemoptysis.    Hematologic/Lymphatic: Negative for bleeding problem.   Skin: Negative for rash.   Musculoskeletal: Negative for falls.   Gastrointestinal: Negative for abdominal pain.   Genitourinary: Negative.    Neurological:  Negative.    Psychiatric/Behavioral: Negative for altered mental status and substance abuse.       Current Outpatient Medications on File Prior to Visit   Medication Sig    ferrous sulfate 325 mg (65 mg iron) Tab tablet Take 1 tablet (325 mg total) by mouth daily with breakfast.    glucosamine HCl-msm-chondroitn 750-375-400 mg Tab Take 1 tablet by mouth 2 (two) times daily.    hydroCHLOROthiazide (HYDRODIURIL) 12.5 MG Tab TAKE 1 TABLET DAILY    KLOR-CON M20 20 mEq tablet TAKE 1 TABLET DAILY    Lactobacillus acidophilus 10 billion cell Cap Take 1 capsule by mouth once daily.    losartan (COZAAR) 50 MG tablet TAKE 1 TABLET DAILY    multivitamin with minerals tablet Take 1 tablet by mouth once daily.    omeprazole (PRILOSEC) 40 MG capsule Take 1 capsule (40 mg total) by mouth once daily.    turmeric root extract 500 mg Cap Take 1 capsule by mouth 2 (two) times daily.    traMADoL (ULTRAM) 50 mg tablet Take 1 tablet (50 mg total) by mouth every 6 (six) hours as needed for Pain. (Patient not taking: No sig reported)     Current Facility-Administered Medications on File Prior to Visit   Medication    denosumab (PROLIA) injection 60 mg       Objective:   Objective:  Wt Readings from Last 3 Encounters:   06/22/22 63.2 kg (139 lb 5.3 oz)   06/10/22 63.5 kg (139 lb 15.9 oz)   06/04/22 63.4 kg (139 lb 12.4 oz)     Temp Readings from Last 3 Encounters:   06/04/22 98.2 °F (36.8 °C)   06/03/22 97.1 °F (36.2 °C) (Tympanic)   12/09/20 97.7 °F (36.5 °C)     BP Readings from Last 3 Encounters:   06/22/22 126/66   06/10/22 120/70   06/04/22 136/63     Pulse Readings from Last 3 Encounters:   06/22/22 76   06/10/22 81   06/04/22 76       Physical Exam  Vitals reviewed.   Constitutional:       Appearance: She is well-developed.   HENT:      Head: Normocephalic and atraumatic.   Eyes:      General: No scleral icterus.     Conjunctiva/sclera: Conjunctivae normal.   Cardiovascular:      Rate and Rhythm: Normal rate and regular  rhythm.      Pulses: Intact distal pulses.      Heart sounds: Murmur heard.   Pulmonary:      Effort: No respiratory distress.      Breath sounds: No wheezing or rales.   Chest:      Chest wall: No tenderness.   Abdominal:      General: Bowel sounds are normal. There is no distension.      Palpations: Abdomen is soft.      Tenderness: There is no guarding.   Musculoskeletal:         General: Normal range of motion.      Cervical back: Normal range of motion and neck supple.   Skin:     General: Skin is warm.   Neurological:      Mental Status: She is alert and oriented to person, place, and time.         Lab Results   Component Value Date    CHOL 244 (H) 12/09/2020    CHOL 260 (H) 10/22/2019    CHOL 251 (H) 04/22/2019     Lab Results   Component Value Date    HDL 76 (H) 12/09/2020    HDL 69 10/22/2019    HDL 69 04/22/2019     Lab Results   Component Value Date    LDLCALC 144.2 12/09/2020    LDLCALC 164.6 (H) 10/22/2019    LDLCALC 155.2 04/22/2019     Lab Results   Component Value Date    TRIG 119 12/09/2020    TRIG 132 10/22/2019    TRIG 134 04/22/2019     Lab Results   Component Value Date    CHOLHDL 31.1 12/09/2020    CHOLHDL 26.5 10/22/2019    CHOLHDL 27.5 04/22/2019       Chemistry        Component Value Date/Time     06/04/2022 1414    K 3.1 (L) 06/04/2022 1414     06/04/2022 1414    CO2 27 06/04/2022 1414    BUN 19 06/04/2022 1414    CREATININE 0.7 06/04/2022 1414     06/04/2022 1414        Component Value Date/Time    CALCIUM 9.8 06/04/2022 1414    ALKPHOS 81 06/04/2022 1414    AST 13 06/04/2022 1414    ALT 12 06/04/2022 1414    BILITOT 0.7 06/04/2022 1414    ESTGFRAFRICA >60 06/04/2022 1414    EGFRNONAA >60 06/04/2022 1414          Lab Results   Component Value Date    TSH 0.594 09/27/2017     No results found for: INR, PROTIME  Lab Results   Component Value Date    WBC 7.41 06/10/2022    HGB 9.1 (L) 06/10/2022    HCT 29.5 (L) 06/10/2022    MCV 91 06/10/2022     06/10/2022      BNP  @LABRCNTIP(BNP,BNPTRIAGEBLO)@  CrCl cannot be calculated (Patient's most recent lab result is older than the maximum 7 days allowed.).     Imaging:  ======  No results found for this or any previous visit.    No results found for this or any previous visit.    No results found for this or any previous visit.    Results for orders placed during the hospital encounter of 07/09/15    X-Ray Chest PA And Lateral    Narrative  Findings: There are no prior radiographs for comparison.  Heart size is normal.  Lungs are clear bilaterally.  There are no pleural effusions.  2 views  IMPRESSION:  No active disease.      Electronically signed by: STONEY MASTERS MD  Date:     07/09/15  Time:    11:11    No results found for this or any previous visit.    No valid procedures specified.    Diagnostic Results:  ECG: Reviewed    The 10-year ASCVD risk score (Heltonregine SINHA Jr., et al., 2013) is: 18.8%    Values used to calculate the score:      Age: 76 years      Sex: Female      Is Non- : No      Diabetic: No      Tobacco smoker: No      Systolic Blood Pressure: 126 mmHg      Is BP treated: Yes      HDL Cholesterol: 76 mg/dL      Total Cholesterol: 244 mg/dL    Assessment and Plan:   Nonrheumatic aortic valve stenosis  -     Echo; Future    Primary hypertension    Statin intolerance    Nonrheumatic aortic valve insufficiency    Anemia, unspecified type      Okay to undergo her GI scopes from cardiac standpoint.  Routine follow-up echocardiogram last was done 4 years ago, not to stop her from her colonoscopy or EGD.  Still sounds as mild to moderate aortic stenosis on physical exam.  Blood pressure controlled.  Reviewed all prior studies.    Follow up in six-month

## 2022-07-01 ENCOUNTER — HOSPITAL ENCOUNTER (OUTPATIENT)
Dept: CARDIOLOGY | Facility: HOSPITAL | Age: 77
Discharge: HOME | End: 2022-07-01
Attending: INTERNAL MEDICINE
Payer: MEDICARE

## 2022-07-01 VITALS
BODY MASS INDEX: 22.34 KG/M2 | SYSTOLIC BLOOD PRESSURE: 126 MMHG | HEIGHT: 66 IN | DIASTOLIC BLOOD PRESSURE: 66 MMHG | WEIGHT: 139 LBS

## 2022-07-01 DIAGNOSIS — I35.0 NONRHEUMATIC AORTIC VALVE STENOSIS: ICD-10-CM

## 2022-07-01 LAB
AORTIC ROOT ANNULUS: 2.79 CM
ASCENDING AORTA: 3.04 CM
AV INDEX (PROSTH): 0.35
AV MEAN GRADIENT: 19 MMHG
AV PEAK GRADIENT: 32 MMHG
AV VALVE AREA: 1.12 CM2
AV VELOCITY RATIO: 0.34
BSA FOR ECHO PROCEDURE: 1.71 M2
CV ECHO LV RWT: 0.5 CM
DOP CALC AO PEAK VEL: 2.82 M/S
DOP CALC AO VTI: 64.5 CM
DOP CALC LVOT AREA: 3.2 CM2
DOP CALC LVOT DIAMETER: 2.02 CM
DOP CALC LVOT PEAK VEL: 0.97 M/S
DOP CALC LVOT STROKE VOLUME: 72.39 CM3
DOP CALC RVOT PEAK VEL: 0.81 M/S
DOP CALC RVOT VTI: 20.1 CM
DOP CALCLVOT PEAK VEL VTI: 22.6 CM
E WAVE DECELERATION TIME: 232.35 MSEC
E/A RATIO: 0.74
ECHO LV POSTERIOR WALL: 1.14 CM (ref 0.6–1.1)
EJECTION FRACTION: 60 %
FRACTIONAL SHORTENING: 42 % (ref 28–44)
INTERVENTRICULAR SEPTUM: 1.22 CM (ref 0.6–1.1)
IVC DIAMETER: 1.48 CM
IVRT: 111.32 MSEC
LA MAJOR: 4.78 CM
LA MINOR: 4.95 CM
LA WIDTH: 4.4 CM
LEFT ATRIUM SIZE: 3.95 CM
LEFT ATRIUM VOLUME INDEX MOD: 35.1 ML/M2
LEFT ATRIUM VOLUME INDEX: 42 ML/M2
LEFT ATRIUM VOLUME MOD: 59.99 CM3
LEFT ATRIUM VOLUME: 71.85 CM3
LEFT INTERNAL DIMENSION IN SYSTOLE: 2.65 CM (ref 2.1–4)
LEFT VENTRICLE DIASTOLIC VOLUME INDEX: 56.92 ML/M2
LEFT VENTRICLE DIASTOLIC VOLUME: 97.33 ML
LEFT VENTRICLE MASS INDEX: 117 G/M2
LEFT VENTRICLE SYSTOLIC VOLUME INDEX: 15.1 ML/M2
LEFT VENTRICLE SYSTOLIC VOLUME: 25.74 ML
LEFT VENTRICULAR INTERNAL DIMENSION IN DIASTOLE: 4.6 CM (ref 3.5–6)
LEFT VENTRICULAR MASS: 200.13 G
LVOT MG: 1.97 MMHG
LVOT MV: 0.65 CM/S
MV PEAK A VEL: 0.93 M/S
MV PEAK E VEL: 0.69 M/S
MV STENOSIS PRESSURE HALF TIME: 67.38 MS
MV VALVE AREA P 1/2 METHOD: 3.26 CM2
PISA TR MAX VEL: 2.64 M/S
PULM VEIN S/D RATIO: 1.32
PV MEAN GRADIENT: 1.73 MMHG
PV MV: 0.64 M/S
PV PEAK D VEL: 0.36 M/S
PV PEAK S VEL: 0.47 M/S
PV PEAK VELOCITY: 0.93 CM/S
RA MAJOR: 4.65 CM
RA PRESSURE: 3 MMHG
RA WIDTH: 3.27 CM
RIGHT VENTRICULAR END-DIASTOLIC DIMENSION: 3.05 CM
SINUS: 2.92 CM
STJ: 2.66 CM
TR MAX PG: 28 MMHG
TRICUSPID ANNULAR PLANE SYSTOLIC EXCURSION: 2.27 CM
TV REST PULMONARY ARTERY PRESSURE: 31 MMHG

## 2022-07-01 PROCEDURE — 93306 ECHO (CUPID ONLY): ICD-10-PCS | Mod: 26,,, | Performed by: INTERNAL MEDICINE

## 2022-07-01 PROCEDURE — 93306 TTE W/DOPPLER COMPLETE: CPT

## 2022-07-01 PROCEDURE — 93306 TTE W/DOPPLER COMPLETE: CPT | Mod: 26,,, | Performed by: INTERNAL MEDICINE

## 2022-07-15 NOTE — PROGRESS NOTES
Procedure instructions reviewed. Place, time, clear liquids only on day before procedure no solid food at all, nothing to eat or drink after 2 am dose of prep on am of procedure, no chewing gum or sucking on candy, take BP medication with sip of water at least 2 hours after am dose of prep on am of procedure, have someone to drive them home and bowel prep instructions reviewed with pt. Pt verbalized understanding.

## 2022-07-19 ENCOUNTER — CLINICAL SUPPORT (OUTPATIENT)
Dept: URGENT CARE | Facility: CLINIC | Age: 77
End: 2022-07-19
Payer: MEDICARE

## 2022-07-19 DIAGNOSIS — Z20.822 ENCOUNTER FOR LABORATORY TESTING FOR COVID-19 VIRUS: ICD-10-CM

## 2022-07-19 PROCEDURE — 99211 OFF/OP EST MAY X REQ PHY/QHP: CPT | Mod: S$GLB,,, | Performed by: PHYSICIAN ASSISTANT

## 2022-07-19 PROCEDURE — 99211 PR OFFICE/OUTPT VISIT, EST, LEVL I: ICD-10-PCS | Mod: S$GLB,,, | Performed by: PHYSICIAN ASSISTANT

## 2022-07-19 PROCEDURE — U0005 INFEC AGEN DETEC AMPLI PROBE: HCPCS | Performed by: PHYSICIAN ASSISTANT

## 2022-07-19 PROCEDURE — U0003 INFECTIOUS AGENT DETECTION BY NUCLEIC ACID (DNA OR RNA); SEVERE ACUTE RESPIRATORY SYNDROME CORONAVIRUS 2 (SARS-COV-2) (CORONAVIRUS DISEASE [COVID-19]), AMPLIFIED PROBE TECHNIQUE, MAKING USE OF HIGH THROUGHPUT TECHNOLOGIES AS DESCRIBED BY CMS-2020-01-R: HCPCS | Performed by: PHYSICIAN ASSISTANT

## 2022-07-20 LAB
SARS-COV-2 RNA RESP QL NAA+PROBE: NOT DETECTED
SARS-COV-2- CYCLE NUMBER: NORMAL

## 2022-07-21 ENCOUNTER — HOSPITAL ENCOUNTER (OUTPATIENT)
Facility: HOSPITAL | Age: 77
Discharge: HOME | End: 2022-07-21
Attending: INTERNAL MEDICINE | Admitting: INTERNAL MEDICINE
Payer: MEDICARE

## 2022-07-21 ENCOUNTER — ANESTHESIA (OUTPATIENT)
Dept: ENDOSCOPY | Facility: HOSPITAL | Age: 77
End: 2022-07-21
Payer: MEDICARE

## 2022-07-21 ENCOUNTER — ANESTHESIA EVENT (OUTPATIENT)
Dept: ENDOSCOPY | Facility: HOSPITAL | Age: 77
End: 2022-07-21
Payer: MEDICARE

## 2022-07-21 VITALS
DIASTOLIC BLOOD PRESSURE: 77 MMHG | BODY MASS INDEX: 22.18 KG/M2 | SYSTOLIC BLOOD PRESSURE: 130 MMHG | OXYGEN SATURATION: 99 % | HEIGHT: 66 IN | RESPIRATION RATE: 18 BRPM | TEMPERATURE: 98 F | HEART RATE: 62 BPM | WEIGHT: 138 LBS

## 2022-07-21 DIAGNOSIS — D64.9 ANEMIA: ICD-10-CM

## 2022-07-21 PROBLEM — D62 ACUTE BLOOD LOSS ANEMIA: Status: ACTIVE | Noted: 2022-07-21

## 2022-07-21 PROCEDURE — 43239 EGD BIOPSY SINGLE/MULTIPLE: CPT | Performed by: INTERNAL MEDICINE

## 2022-07-21 PROCEDURE — 45378 DIAGNOSTIC COLONOSCOPY: CPT | Mod: ,,, | Performed by: INTERNAL MEDICINE

## 2022-07-21 PROCEDURE — 25000003 PHARM REV CODE 250: Performed by: NURSE ANESTHETIST, CERTIFIED REGISTERED

## 2022-07-21 PROCEDURE — 37000009 HC ANESTHESIA EA ADD 15 MINS: Performed by: INTERNAL MEDICINE

## 2022-07-21 PROCEDURE — 45378 DIAGNOSTIC COLONOSCOPY: CPT | Performed by: INTERNAL MEDICINE

## 2022-07-21 PROCEDURE — 88342 CHG IMMUNOCYTOCHEMISTRY: ICD-10-PCS | Mod: 26,,, | Performed by: STUDENT IN AN ORGANIZED HEALTH CARE EDUCATION/TRAINING PROGRAM

## 2022-07-21 PROCEDURE — 43239 PR EGD, FLEX, W/BIOPSY, SGL/MULTI: ICD-10-PCS | Mod: 51,,, | Performed by: INTERNAL MEDICINE

## 2022-07-21 PROCEDURE — 88342 IMHCHEM/IMCYTCHM 1ST ANTB: CPT | Performed by: STUDENT IN AN ORGANIZED HEALTH CARE EDUCATION/TRAINING PROGRAM

## 2022-07-21 PROCEDURE — 88305 TISSUE EXAM BY PATHOLOGIST: ICD-10-PCS | Mod: 26,,, | Performed by: STUDENT IN AN ORGANIZED HEALTH CARE EDUCATION/TRAINING PROGRAM

## 2022-07-21 PROCEDURE — 37000008 HC ANESTHESIA 1ST 15 MINUTES: Performed by: INTERNAL MEDICINE

## 2022-07-21 PROCEDURE — 88305 TISSUE EXAM BY PATHOLOGIST: CPT | Mod: 26,,, | Performed by: STUDENT IN AN ORGANIZED HEALTH CARE EDUCATION/TRAINING PROGRAM

## 2022-07-21 PROCEDURE — 45378 PR COLONOSCOPY,DIAGNOSTIC: ICD-10-PCS | Mod: ,,, | Performed by: INTERNAL MEDICINE

## 2022-07-21 PROCEDURE — 43239 EGD BIOPSY SINGLE/MULTIPLE: CPT | Mod: 51,,, | Performed by: INTERNAL MEDICINE

## 2022-07-21 PROCEDURE — 63600175 PHARM REV CODE 636 W HCPCS: Performed by: NURSE ANESTHETIST, CERTIFIED REGISTERED

## 2022-07-21 PROCEDURE — 88305 TISSUE EXAM BY PATHOLOGIST: CPT | Performed by: STUDENT IN AN ORGANIZED HEALTH CARE EDUCATION/TRAINING PROGRAM

## 2022-07-21 PROCEDURE — 88342 IMHCHEM/IMCYTCHM 1ST ANTB: CPT | Mod: 26,,, | Performed by: STUDENT IN AN ORGANIZED HEALTH CARE EDUCATION/TRAINING PROGRAM

## 2022-07-21 PROCEDURE — 27201012 HC FORCEPS, HOT/COLD, DISP: Performed by: INTERNAL MEDICINE

## 2022-07-21 RX ORDER — LIDOCAINE HYDROCHLORIDE 10 MG/ML
INJECTION, SOLUTION EPIDURAL; INFILTRATION; INTRACAUDAL; PERINEURAL
Status: DISCONTINUED | OUTPATIENT
Start: 2022-07-21 | End: 2022-07-21

## 2022-07-21 RX ORDER — SODIUM CHLORIDE, SODIUM LACTATE, POTASSIUM CHLORIDE, CALCIUM CHLORIDE 600; 310; 30; 20 MG/100ML; MG/100ML; MG/100ML; MG/100ML
INJECTION, SOLUTION INTRAVENOUS CONTINUOUS PRN
Status: DISCONTINUED | OUTPATIENT
Start: 2022-07-21 | End: 2022-07-21

## 2022-07-21 RX ORDER — PROPOFOL 10 MG/ML
VIAL (ML) INTRAVENOUS
Status: DISCONTINUED | OUTPATIENT
Start: 2022-07-21 | End: 2022-07-21

## 2022-07-21 RX ADMIN — SODIUM CHLORIDE, SODIUM LACTATE, POTASSIUM CHLORIDE, AND CALCIUM CHLORIDE: .6; .31; .03; .02 INJECTION, SOLUTION INTRAVENOUS at 09:07

## 2022-07-21 RX ADMIN — LIDOCAINE HYDROCHLORIDE 80 MG: 10 INJECTION, SOLUTION EPIDURAL; INFILTRATION; INTRACAUDAL; PERINEURAL at 09:07

## 2022-07-21 RX ADMIN — PROPOFOL 30 MG: 10 INJECTION, EMULSION INTRAVENOUS at 09:07

## 2022-07-21 RX ADMIN — PROPOFOL 80 MG: 10 INJECTION, EMULSION INTRAVENOUS at 09:07

## 2022-07-21 RX ADMIN — PROPOFOL 20 MG: 10 INJECTION, EMULSION INTRAVENOUS at 09:07

## 2022-07-21 RX ADMIN — PROPOFOL 40 MG: 10 INJECTION, EMULSION INTRAVENOUS at 09:07

## 2022-07-21 NOTE — PLAN OF CARE
Dr Pierre came to bedside and discussed findings. NO N/V,  no abdominal pain, no GI bleeding, and vitals stable.  Pt discharged from unit.

## 2022-07-21 NOTE — TRANSFER OF CARE
"Anesthesia Transfer of Care Note    Patient: Joyce Marino    Procedure(s) Performed: Procedure(s) (LRB):  COLONOSCOPY (N/A)  EGD (ESOPHAGOGASTRODUODENOSCOPY) (N/A)    Patient location: GI    Anesthesia Type: MAC    Transport from OR: Transported from OR on room air with adequate spontaneous ventilation    Post pain: adequate analgesia    Post assessment: no apparent anesthetic complications and tolerated procedure well    Post vital signs: stable    Level of consciousness: awake    Nausea/Vomiting: no nausea/vomiting    Complications: none    Transfer of care protocol was followed      Last vitals:   Visit Vitals  /79 (BP Location: Left arm, Patient Position: Sitting)   Pulse 72   Temp 36.9 °C (98.4 °F)   Resp 19   Ht 5' 6" (1.676 m)   Wt 62.6 kg (138 lb)   SpO2 97%   Breastfeeding No   BMI 22.27 kg/m²     "

## 2022-07-21 NOTE — ANESTHESIA PREPROCEDURE EVALUATION
07/21/2022  Joyce Marino is a 77 y.o., female.      Pre-op Assessment    I have reviewed the Patient Summary Reports.     I have reviewed the Nursing Notes. I have reviewed the NPO Status.   I have reviewed the Medications.     Review of Systems  Anesthesia Hx:  No problems with previous Anesthesia  Denies Family Hx of Anesthesia complications.   Denies Personal Hx of Anesthesia complications.   Social:  Non-Smoker    Hematology/Oncology:     Oncology Normal    -- Anemia:   EENT/Dental:EENT/Dental Normal   Cardiovascular:   Exercise tolerance: good Hypertension ECG has been reviewed.    Pulmonary:   Asthma asymptomatic and mild    Renal/:  Renal/ Normal     Hepatic/GI:  Hepatic/GI Normal    Musculoskeletal:   Arthritis     Neurological:  Neurology Normal    Endocrine:  Endocrine Normal    Dermatological:  Skin Normal    Psych:  Psychiatric Normal           Physical Exam  General: Well nourished, Cooperative, Alert and Oriented    Airway:  Mallampati: II   Mouth Opening: Normal  TM Distance: Normal  Tongue: Normal  Neck ROM: Normal ROM    Dental:  Intact, Periodontal disease  Pt denies loose or removable dentition  Heart:  Rate: Normal  Rhythm: Regular Rhythm        Anesthesia Plan  Type of Anesthesia, risks & benefits discussed:    Anesthesia Type: MAC  Intra-op Monitoring Plan: Standard ASA Monitors  Post Op Pain Control Plan: multimodal analgesia  Induction:  IV  Informed Consent: Informed consent signed with the Patient and all parties understand the risks and agree with anesthesia plan.  All questions answered.   ASA Score: 2  Day of Surgery Review of History & Physical: H&P Update referred to the surgeon/provider.I have interviewed and examined the patient. I have reviewed the patient's H&P dated: There are no significant changes.     Ready For Surgery From Anesthesia Perspective.     .

## 2022-07-21 NOTE — ANESTHESIA POSTPROCEDURE EVALUATION
Anesthesia Post Evaluation    Patient: Joyce Marino    Procedure(s) Performed: Procedure(s) (LRB):  COLONOSCOPY (N/A)  EGD (ESOPHAGOGASTRODUODENOSCOPY) (N/A)    Final Anesthesia Type: MAC      Patient location during evaluation: GI PACU  Patient participation: Yes- Able to Participate  Level of consciousness: awake and alert  Post-procedure vital signs: reviewed and stable  Pain management: adequate  Airway patency: patent    PONV status at discharge: No PONV  Anesthetic complications: no      Cardiovascular status: blood pressure returned to baseline and hemodynamically stable  Respiratory status: unassisted, spontaneous ventilation and room air  Hydration status: euvolemic  Follow-up not needed.          Vitals Value Taken Time   BP 93/51 07/21/22 0935   Temp 36.6 °C (97.8 °F) 07/21/22 0935   Pulse 60 07/21/22 0935   Resp 17 07/21/22 0935   SpO2 99 % 07/21/22 0935         No case tracking events are documented in the log.      Pain/Cass Score: Cass Score: 9 (7/21/2022  9:35 AM)

## 2022-07-21 NOTE — [employer] PATIENT INSTRUCTIONS
Discharge Summary/Instructions after an Endoscopic Procedure  Patient Name: Joyce Marino  Patient MRN: [id number]  Patient YOB: 1945 Thursday, July 21, 2022 Conrado Pierre MD  Dear patient,  As a result of recent federal legislation (The Federal Cures Act), you may   receive lab or pathology results from your procedure in your [facility]   account before your physician is able to contact you. Your physician or   their representative will relay the results to you with their   recommendations at their soonest availability.  Thank you,  RESTRICTIONS:  During your procedure today, you received medications for sedation.  These   medications may affect your judgment, balance and coordination.  Therefore,   for 24 hours, you have the following restrictions:   - DO NOT drive a car, operate machinery, make legal/financial decisions,   sign important papers or drink alcohol.    ACTIVITY:  Today: no heavy lifting, straining or running due to procedural   sedation/anesthesia.  The following day: return to full activity including work.  DIET:  Eat and drink normally unless instructed otherwise.     TREATMENT FOR COMMON SIDE EFFECTS:  - Mild abdominal pain, nausea, belching, bloating or excessive gas:  rest,   eat lightly and use a heating pad.  - Sore Throat: treat with throat lozenges and/or gargle with warm salt   water.  - Because air was used during the procedure, expelling large amounts of air   from your rectum or belching is normal.  - If a bowel prep was taken, you may not have a bowel movement for 1-3 days.    This is normal.  SYMPTOMS TO WATCH FOR AND REPORT TO YOUR PHYSICIAN:  1. Abdominal pain or bloating, other than gas cramps.  2. Chest pain.  3. Back pain.  4. Signs of infection such as: chills or fever occurring within 24 hours   after the procedure.  5. Rectal bleeding, which would show as bright red, maroon, or black stools.   (A tablespoon of blood from the rectum is not serious, especially  if   hemorrhoids are present.)  6. Vomiting.  7. Weakness or dizziness.  GO DIRECTLY TO THE NEAREST EMERGENCY ROOM IF YOU HAVE ANY OF THE FOLLOWING:      Difficulty breathing              Chills and/or fever over 101 F   Persistent vomiting and/or vomiting blood   Severe abdominal pain   Severe chest pain   Black, tarry stools   Bleeding- more than one tablespoon   Any other symptom or condition that you feel may need urgent attention  Your doctor recommends these additional instructions:  If any biopsies were taken, your doctors clinic will contact you in 1 to 2   weeks with any results.  - Discharge patient to home (via wheelchair).   - Resume previous diet.   - Continue present medications.   - Repeat colonoscopy in 10 years for surveillance.   - Return to referring physician as previously scheduled.   - To visualize the small bowel, perform video capsule endoscopy at   appointment to be scheduled if Hb drops for further evalaution of anemia  For questions, problems or results please call your physician Conrado Pierre MD at Work:  [phone]  If you have any questions about the above instructions, call the GI   department at [phone] or call the endoscopy unit at [phone]   from 7am until 3 pm.  [facility] [place], EMERGENCY ROOM PHONE NUMBER:   [phone]  IF A COMPLICATION OR EMERGENCY SITUATION ARISES AND YOU ARE UNABLE TO REACH   YOUR PHYSICIAN - GO DIRECTLY TO THE EMERGENCY ROOM.  I have read or have had read to me these discharge instructions for my   procedure and have received a written copy.  I understand these   instructions and will follow-up with my physician if I have any questions.     __________________________________       _____________________________________  Nurse Signature                                          Patient/Designated   Responsible Party Signature  MD Conrado Clements MD  7/21/2022 9:37:46 AM  This report has  been verified and signed electronically.  Dear patient,  As a result of recent federal legislation (The Federal Cures Act), you may   receive lab or pathology results from your procedure in your [facility]   account before your physician is able to contact you. Your physician or   their representative will relay the results to you with their   recommendations at their soonest availability.  Thank you,  [employer]ATION

## 2022-07-21 NOTE — DISCHARGE SUMMARY
[facility] End[facility] (Hospital)  Discharge Note  Short Stay    Procedure(s) (LRB):  COLONOSCOPY (N/A)  EGD (ESOPHAGOGASTRODUODENOSCOPY) (N/A)    OUTCOME: Patient tolerated treatment/procedure well without complication and is now ready for discharge.    DISPOSITION: Home or Self Care    FINAL DIAGNOSIS:  <principal problem not specified>    FOLLOWUP: With primary care provider    DISCHARGE INSTRUCTIONS:  No discharge procedures on file.     =

## 2022-07-21 NOTE — H&P
Short Stay Endoscopy History and Physical    PCP - Porter Vasques MD  Referring Physician - Rowan Decker PA-C  [address] [facility] [place] [address]    Procedure - Endoscopy + Colonoscopy  ASA - per anesthesia  Mallampati - per anesthesia  History of Anesthesia problems - no  Family history Anesthesia problems -  no   Plan of anesthesia - General    HPI  77 y.o. female  Reason for procedure: Anemia      ROS:  Constitutional: No fevers, chills, No weight loss  CV: No chest pain  Pulm: No cough, No shortness of breath  GI: see HPI    Medical History:  has a past medical history of Arthritis, Asthma, Hyperlipidemia, and Hypertension.    Surgical History:  has a past surgical history that includes michael in right leg; Oophorectomy; and Hysterectomy (1992).    Family History: family history includes Emphysema in her mother; Heart disease in her father and mother; Lung cancer in her maternal grandfather..    Social History:  reports that she has never smoked. She has never used smokeless tobacco. She reports that she does not drink alcohol and does not use drugs.    Review of patient's allergies indicates:  No Known Allergies    Medications:   Facility-Administered Medications Prior to Admission   Medication Dose Route Frequency Provider Last Rate Last Admin    denosumab (PROLIA) injection 60 mg  60 mg Subcutaneous Q6 Months Yoon Pennington MD   60 mg at 11/04/19 0939     Medications Prior to Admission   Medication Sig Dispense Refill Last Dose    ferrous sulfate 325 mg (65 mg iron) Tab tablet Take 1 tablet (325 mg total) by mouth daily with breakfast. 60 tablet 0 7/20/2022 at Unknown time    glucosamine HCl-msm-chondroitn 750-375-400 mg Tab Take 1 tablet by mouth 2 (two) times daily.   7/20/2022 at Unknown time    hydroCHLOROthiazide (HYDRODIURIL) 12.5 MG Tab TAKE 1 TABLET DAILY 90 tablet 3 7/20/2022 at Unknown time    Lactobacillus acidophilus 10 billion cell Cap Take 1 capsule by mouth once daily.    7/20/2022 at Unknown time    losartan (COZAAR) 50 MG tablet TAKE 1 TABLET DAILY 90 tablet 3 7/20/2022 at Unknown time    omeprazole (PRILOSEC) 40 MG capsule Take 1 capsule (40 mg total) by mouth once daily. 30 capsule 1 7/20/2022 at Unknown time    KLOR-CON M20 20 mEq tablet TAKE 1 TABLET DAILY 90 tablet 3     multivitamin with minerals tablet Take 1 tablet by mouth once daily.       traMADoL (ULTRAM) 50 mg tablet Take 1 tablet (50 mg total) by mouth every 6 (six) hours as needed for Pain. (Patient not taking: No sig reported) 30 tablet 3     turmeric root extract 500 mg Cap Take 1 capsule by mouth 2 (two) times daily.          Physical Exam:    Vital Signs:   Vitals:    07/21/22 0806   BP: 136/79   Pulse: 72   Resp: 19   Temp: 98.4 °F (36.9 °C)       General Appearance: Well appearing in no acute distress  Abdomen: Soft, non tender, non distended with normal bowel sounds, no masses    Labs:  Lab Results   Component Value Date    WBC 7.41 06/10/2022    HGB 9.1 (L) 06/10/2022    HCT 29.5 (L) 06/10/2022     06/10/2022    CHOL 244 (H) 12/09/2020    TRIG 119 12/09/2020    HDL 76 (H) 12/09/2020    ALT 12 06/04/2022    AST 13 06/04/2022     06/04/2022    K 3.1 (L) 06/04/2022     06/04/2022    CREATININE 0.7 06/04/2022    BUN 19 06/04/2022    CO2 27 06/04/2022    TSH 0.594 09/27/2017    HGBA1C 5.4 09/27/2017       I have explained the risks and benefits of this endoscopic procedure to the patient including but not limited to bleeding, inflammation, infection, perforation, and death.    SEDATION PLAN: per anesthesia      History reviewed, vital signs satisfactory, cardiopulmonary status satisfactory, sedation options, risks and plans have been discussed with the patient  All their questions were answered and the patient agrees to the sedation procedures as planned and the patient is deemed an appropriate candidate for the sedation as planned.    Procedure explained to patient, informed consent  obtained and placed in chart.        Conrado Pierre MD

## 2022-07-21 NOTE — PROVATION PATIENT INSTRUCTIONS
Discharge Summary/Instructions after an Endoscopic Procedure  Patient Name: Joyce Marino  Patient MRN: [id number]  Patient YOB: 1945 Thursday, July 21, 2022 Conrado Pierre MD  Dear patient,  As a result of recent federal legislation (The Federal Cures Act), you may   receive lab or pathology results from your procedure in your [facility]   account before your physician is able to contact you. Your physician or   their representative will relay the results to you with their   recommendations at their soonest availability.  Thank you,  RESTRICTIONS:  During your procedure today, you received medications for sedation.  These   medications may affect your judgment, balance and coordination.  Therefore,   for 24 hours, you have the following restrictions:   - DO NOT drive a car, operate machinery, make legal/financial decisions,   sign important papers or drink alcohol.    ACTIVITY:  Today: no heavy lifting, straining or running due to procedural   sedation/anesthesia.  The following day: return to full activity including work.  DIET:  Eat and drink normally unless instructed otherwise.     TREATMENT FOR COMMON SIDE EFFECTS:  - Mild abdominal pain, nausea, belching, bloating or excessive gas:  rest,   eat lightly and use a heating pad.  - Sore Throat: treat with throat lozenges and/or gargle with warm salt   water.  - Because air was used during the procedure, expelling large amounts of air   from your rectum or belching is normal.  - If a bowel prep was taken, you may not have a bowel movement for 1-3 days.    This is normal.  SYMPTOMS TO WATCH FOR AND REPORT TO YOUR PHYSICIAN:  1. Abdominal pain or bloating, other than gas cramps.  2. Chest pain.  3. Back pain.  4. Signs of infection such as: chills or fever occurring within 24 hours   after the procedure.  5. Rectal bleeding, which would show as bright red, maroon, or black stools.   (A tablespoon of blood from the rectum is not serious, especially  if   hemorrhoids are present.)  6. Vomiting.  7. Weakness or dizziness.  GO DIRECTLY TO THE NEAREST EMERGENCY ROOM IF YOU HAVE ANY OF THE FOLLOWING:      Difficulty breathing              Chills and/or fever over 101 F   Persistent vomiting and/or vomiting blood   Severe abdominal pain   Severe chest pain   Black, tarry stools   Bleeding- more than one tablespoon   Any other symptom or condition that you feel may need urgent attention  Your doctor recommends these additional instructions:  If any biopsies were taken, your doctors clinic will contact you in 1 to 2   weeks with any results.  - Discharge patient to home (via wheelchair).   - Resume previous diet.   - Continue present medications.   - Await pathology results.   - Return to referring physician as previously scheduled.  For questions, problems or results please call your physician Conrado Pierre MD at Work:  [phone]  If you have any questions about the above instructions, call the GI   department at [phone] or call the endoscopy unit at [phone]   from 7am until 3 pm.  [facility] [place] [facility], EMERGENCY ROOM PHONE NUMBER:   [phone]  IF A COMPLICATION OR EMERGENCY SITUATION ARISES AND YOU ARE UNABLE TO REACH   YOUR PHYSICIAN - GO DIRECTLY TO THE EMERGENCY ROOM.  I have read or have had read to me these discharge instructions for my   procedure and have received a written copy.  I understand these   instructions and will follow-up with my physician if I have any questions.     __________________________________       _____________________________________  Nurse Signature                                          Patient/Designated   Responsible Party Signature  MD Conrado Clements MD  7/21/2022 9:23:47 AM  This report has been verified and signed electronically.  Dear patient,  As a result of recent federal legislation (The Federal Cures Act), you may   receive lab or pathology results  from your procedure in your [facility]   account before your physician is able to contact you. Your physician or   their representative will relay the results to you with their   recommendations at their soonest availability.  Thank you,  PROVATION

## 2022-07-27 ENCOUNTER — PATIENT MESSAGE (OUTPATIENT)
Dept: ADMINISTRATIVE | Facility: HOSPITAL | Age: 77
End: 2022-07-27
Payer: MEDICARE

## 2022-07-27 LAB
FINAL PATHOLOGIC DIAGNOSIS: NORMAL
GROSS: NORMAL
Lab: NORMAL

## 2022-07-28 ENCOUNTER — PATIENT MESSAGE (OUTPATIENT)
Dept: GASTROENTEROLOGY | Facility: CLINIC | Age: 77
End: 2022-07-28
Payer: MEDICARE

## 2022-08-01 ENCOUNTER — TELEPHONE (OUTPATIENT)
Dept: ADMINISTRATIVE | Facility: HOSPITAL | Age: 77
End: 2022-08-01
Payer: MEDICARE

## 2022-08-04 ENCOUNTER — TELEPHONE (OUTPATIENT)
Dept: GASTROENTEROLOGY | Facility: CLINIC | Age: 77
End: 2022-08-04
Payer: MEDICARE

## 2022-08-04 NOTE — TELEPHONE ENCOUNTER
Left message on patient voicemail to call back to get results    ----- Message from Conrado Pierre MD sent at 7/28/2022  1:03 PM CDT -----   Biopsy result from your upper endoscopy came back normal. Please do not hesitate to call with questions or concerns.

## 2022-08-24 ENCOUNTER — LAB VISIT (OUTPATIENT)
Dept: LAB | Facility: HOSPITAL | Age: 77
End: 2022-08-24
Attending: PHYSICIAN ASSISTANT
Payer: MEDICARE

## 2022-08-24 ENCOUNTER — OFFICE VISIT (OUTPATIENT)
Dept: GASTROENTEROLOGY | Facility: CLINIC | Age: 77
End: 2022-08-24
Payer: MEDICARE

## 2022-08-24 VITALS
OXYGEN SATURATION: 99 % | WEIGHT: 137.56 LBS | SYSTOLIC BLOOD PRESSURE: 132 MMHG | DIASTOLIC BLOOD PRESSURE: 70 MMHG | BODY MASS INDEX: 22.11 KG/M2 | HEART RATE: 60 BPM | HEIGHT: 66 IN

## 2022-08-24 DIAGNOSIS — Z86.2 HISTORY OF IRON DEFICIENCY ANEMIA: ICD-10-CM

## 2022-08-24 DIAGNOSIS — Z78.0 MENOPAUSE: ICD-10-CM

## 2022-08-24 DIAGNOSIS — K29.70 GASTRITIS, PRESENCE OF BLEEDING UNSPECIFIED, UNSPECIFIED CHRONICITY, UNSPECIFIED GASTRITIS TYPE: ICD-10-CM

## 2022-08-24 DIAGNOSIS — R11.0 NAUSEA: ICD-10-CM

## 2022-08-24 DIAGNOSIS — R14.2 BELCHING: Primary | ICD-10-CM

## 2022-08-24 LAB
BASOPHILS # BLD AUTO: 0.06 K/UL (ref 0–0.2)
BASOPHILS NFR BLD: 1.4 % (ref 0–1.9)
DIFFERENTIAL METHOD: ABNORMAL
EOSINOPHIL # BLD AUTO: 0.3 K/UL (ref 0–0.5)
EOSINOPHIL NFR BLD: 5.7 % (ref 0–8)
ERYTHROCYTE [DISTWIDTH] IN BLOOD BY AUTOMATED COUNT: 14.3 % (ref 11.5–14.5)
FERRITIN SERPL-MCNC: 18 NG/ML (ref 20–300)
HCT VFR BLD AUTO: 37.2 % (ref 37–48.5)
HGB BLD-MCNC: 12 G/DL (ref 12–16)
IMM GRANULOCYTES # BLD AUTO: 0.01 K/UL (ref 0–0.04)
IMM GRANULOCYTES NFR BLD AUTO: 0.2 % (ref 0–0.5)
IRON SERPL-MCNC: 40 UG/DL (ref 30–160)
LYMPHOCYTES # BLD AUTO: 1.2 K/UL (ref 1–4.8)
LYMPHOCYTES NFR BLD: 27 % (ref 18–48)
MCH RBC QN AUTO: 26.5 PG (ref 27–31)
MCHC RBC AUTO-ENTMCNC: 32.3 G/DL (ref 32–36)
MCV RBC AUTO: 82 FL (ref 82–98)
MONOCYTES # BLD AUTO: 0.5 K/UL (ref 0.3–1)
MONOCYTES NFR BLD: 11.6 % (ref 4–15)
NEUTROPHILS # BLD AUTO: 2.4 K/UL (ref 1.8–7.7)
NEUTROPHILS NFR BLD: 54.1 % (ref 38–73)
NRBC BLD-RTO: 0 /100 WBC
PLATELET # BLD AUTO: 301 K/UL (ref 150–450)
PMV BLD AUTO: 10.2 FL (ref 9.2–12.9)
RBC # BLD AUTO: 4.52 M/UL (ref 4–5.4)
SATURATED IRON: 9 % (ref 20–50)
TOTAL IRON BINDING CAPACITY: 466 UG/DL (ref 250–450)
TRANSFERRIN SERPL-MCNC: 315 MG/DL (ref 200–375)
WBC # BLD AUTO: 4.41 K/UL (ref 3.9–12.7)

## 2022-08-24 PROCEDURE — 36415 COLL VENOUS BLD VENIPUNCTURE: CPT | Performed by: PHYSICIAN ASSISTANT

## 2022-08-24 PROCEDURE — 4010F ACE/ARB THERAPY RXD/TAKEN: CPT | Mod: CPTII,S$GLB,, | Performed by: PHYSICIAN ASSISTANT

## 2022-08-24 PROCEDURE — 3288F FALL RISK ASSESSMENT DOCD: CPT | Mod: CPTII,S$GLB,, | Performed by: PHYSICIAN ASSISTANT

## 2022-08-24 PROCEDURE — 1101F PR PT FALLS ASSESS DOC 0-1 FALLS W/OUT INJ PAST YR: ICD-10-PCS | Mod: CPTII,S$GLB,, | Performed by: PHYSICIAN ASSISTANT

## 2022-08-24 PROCEDURE — 85025 COMPLETE CBC W/AUTO DIFF WBC: CPT | Performed by: PHYSICIAN ASSISTANT

## 2022-08-24 PROCEDURE — 3075F PR MOST RECENT SYSTOLIC BLOOD PRESS GE 130-139MM HG: ICD-10-PCS | Mod: CPTII,S$GLB,, | Performed by: PHYSICIAN ASSISTANT

## 2022-08-24 PROCEDURE — 4010F PR ACE/ARB THEARPY RXD/TAKEN: ICD-10-PCS | Mod: CPTII,S$GLB,, | Performed by: PHYSICIAN ASSISTANT

## 2022-08-24 PROCEDURE — 3075F SYST BP GE 130 - 139MM HG: CPT | Mod: CPTII,S$GLB,, | Performed by: PHYSICIAN ASSISTANT

## 2022-08-24 PROCEDURE — 3288F PR FALLS RISK ASSESSMENT DOCUMENTED: ICD-10-PCS | Mod: CPTII,S$GLB,, | Performed by: PHYSICIAN ASSISTANT

## 2022-08-24 PROCEDURE — 1126F AMNT PAIN NOTED NONE PRSNT: CPT | Mod: CPTII,S$GLB,, | Performed by: PHYSICIAN ASSISTANT

## 2022-08-24 PROCEDURE — 1126F PR PAIN SEVERITY QUANTIFIED, NO PAIN PRESENT: ICD-10-PCS | Mod: CPTII,S$GLB,, | Performed by: PHYSICIAN ASSISTANT

## 2022-08-24 PROCEDURE — 99999 PR PBB SHADOW E&M-EST. PATIENT-LVL III: CPT | Mod: PBBFAC,,, | Performed by: PHYSICIAN ASSISTANT

## 2022-08-24 PROCEDURE — 99214 PR OFFICE/OUTPT VISIT, EST, LEVL IV, 30-39 MIN: ICD-10-PCS | Mod: S$GLB,,, | Performed by: PHYSICIAN ASSISTANT

## 2022-08-24 PROCEDURE — 3078F PR MOST RECENT DIASTOLIC BLOOD PRESSURE < 80 MM HG: ICD-10-PCS | Mod: CPTII,S$GLB,, | Performed by: PHYSICIAN ASSISTANT

## 2022-08-24 PROCEDURE — 1101F PT FALLS ASSESS-DOCD LE1/YR: CPT | Mod: CPTII,S$GLB,, | Performed by: PHYSICIAN ASSISTANT

## 2022-08-24 PROCEDURE — 99214 OFFICE O/P EST MOD 30 MIN: CPT | Mod: S$GLB,,, | Performed by: PHYSICIAN ASSISTANT

## 2022-08-24 PROCEDURE — 84466 ASSAY OF TRANSFERRIN: CPT | Performed by: PHYSICIAN ASSISTANT

## 2022-08-24 PROCEDURE — 3078F DIAST BP <80 MM HG: CPT | Mod: CPTII,S$GLB,, | Performed by: PHYSICIAN ASSISTANT

## 2022-08-24 PROCEDURE — 1159F PR MEDICATION LIST DOCUMENTED IN MEDICAL RECORD: ICD-10-PCS | Mod: CPTII,S$GLB,, | Performed by: PHYSICIAN ASSISTANT

## 2022-08-24 PROCEDURE — 99999 PR PBB SHADOW E&M-EST. PATIENT-LVL III: ICD-10-PCS | Mod: PBBFAC,,, | Performed by: PHYSICIAN ASSISTANT

## 2022-08-24 PROCEDURE — 82728 ASSAY OF FERRITIN: CPT | Performed by: PHYSICIAN ASSISTANT

## 2022-08-24 PROCEDURE — 1159F MED LIST DOCD IN RCRD: CPT | Mod: CPTII,S$GLB,, | Performed by: PHYSICIAN ASSISTANT

## 2022-08-24 RX ORDER — OMEPRAZOLE 20 MG/1
20 CAPSULE, DELAYED RELEASE ORAL DAILY
Qty: 30 CAPSULE | Refills: 0 | Status: SHIPPED | OUTPATIENT
Start: 2022-08-24 | End: 2023-11-13 | Stop reason: SDUPTHER

## 2022-08-24 RX ORDER — OMEPRAZOLE 20 MG/1
20 CAPSULE, DELAYED RELEASE ORAL DAILY
Qty: 90 CAPSULE | Refills: 2 | Status: SHIPPED | OUTPATIENT
Start: 2022-08-24 | End: 2023-08-24

## 2022-08-24 RX ORDER — OMEPRAZOLE 20 MG/1
20 CAPSULE, DELAYED RELEASE ORAL DAILY
Qty: 30 CAPSULE | Refills: 5 | Status: SHIPPED | OUTPATIENT
Start: 2022-08-24 | End: 2022-08-24

## 2022-08-24 NOTE — PROGRESS NOTES
Subjective:      Patient ID: Joyce Marino is a 77 y.o. female.    Chief Complaint: Results    HPI:  Patient reports to clinic today for follow up of the above. Has history of symptomatic anemia requiring transfusion. Ferritin at this time was 10. EGD and colonoscopy performed 7/21 with no bleeding identified. Repeat blood work showed hemoglobin trending up. She is here today for follow up. She reports no blood in the stool but her stool can be dark. She takes iron. Only GI complaint is belching and mild reflux type symptoms that began after she ran out of her Nexium. Denies any additional complaints today.     Review of Systems   Constitutional: Negative for activity change, appetite change, chills, diaphoresis, fatigue, fever and unexpected weight change.   HENT: Negative for trouble swallowing.    Respiratory: Negative for shortness of breath.    Cardiovascular: Negative for chest pain.   Gastrointestinal: Positive for nausea. Negative for abdominal distention, abdominal pain, anal bleeding, blood in stool, constipation, diarrhea and vomiting.   Skin: Negative for color change and pallor.   Neurological: Negative for dizziness, weakness and light-headedness.   Psychiatric/Behavioral: Negative for dysphoric mood. The patient is not nervous/anxious.        Medical History: Reviewed    Social History: Reviewed    Allergies: Reviewed    Objective:     Physical Exam  Constitutional:       General: She is not in acute distress.     Appearance: Normal appearance. She is not ill-appearing, toxic-appearing or diaphoretic.   HENT:      Head: Normocephalic and atraumatic.   Eyes:      General: No scleral icterus.     Extraocular Movements: Extraocular movements intact.   Cardiovascular:      Rate and Rhythm: Normal rate and regular rhythm.   Pulmonary:      Effort: Pulmonary effort is normal. No respiratory distress.      Breath sounds: Normal breath sounds.   Abdominal:      General: Bowel sounds are normal. There is  no distension.      Palpations: Abdomen is soft.      Tenderness: There is no abdominal tenderness. There is no guarding.   Musculoskeletal:         General: Normal range of motion.      Cervical back: Normal range of motion.   Skin:     General: Skin is warm and dry.      Coloration: Skin is not jaundiced or pale.   Neurological:      Mental Status: She is alert and oriented to person, place, and time.         Assessment:     1. Belching    2. Gastritis, presence of bleeding unspecified, unspecified chronicity, unspecified gastritis type    3. Nausea    4. History of iron deficiency anemia        Plan:     -Refill Nexium. Will try lower dose, as her symptoms are mild. EGD just completed with no significant findings.   -Recommend VCE given history of anemia with transfusion and normal EGD/colon. Patient would like to see what labs look like today prior to moving forward.  -CBC and iron studies today.      Joyce was seen today for results.    Diagnoses and all orders for this visit:    Belching  -     omeprazole (PRILOSEC) 20 MG capsule; Take 1 capsule (20 mg total) by mouth once daily.    Gastritis, presence of bleeding unspecified, unspecified chronicity, unspecified gastritis type  -     Discontinue: omeprazole (PRILOSEC) 20 MG capsule; Take 1 capsule (20 mg total) by mouth once daily.  -     omeprazole (PRILOSEC) 20 MG capsule; Take 1 capsule (20 mg total) by mouth once daily.  -     omeprazole (PRILOSEC) 20 MG capsule; Take 1 capsule (20 mg total) by mouth once daily.    Nausea  -     Discontinue: omeprazole (PRILOSEC) 20 MG capsule; Take 1 capsule (20 mg total) by mouth once daily.  -     omeprazole (PRILOSEC) 20 MG capsule; Take 1 capsule (20 mg total) by mouth once daily.  -     omeprazole (PRILOSEC) 20 MG capsule; Take 1 capsule (20 mg total) by mouth once daily.    History of iron deficiency anemia  -     CBC Auto Differential; Future  -     IRON AND TIBC; Future  -     Ferritin; Future  -     omeprazole  (PRILOSEC) 20 MG capsule; Take 1 capsule (20 mg total) by mouth once daily.        No follow-ups on file.    Thank you for the opportunity to participate in the care of this patient.   Rowan Decker PA-C.

## 2022-08-25 ENCOUNTER — TELEPHONE (OUTPATIENT)
Dept: GASTROENTEROLOGY | Facility: CLINIC | Age: 77
End: 2022-08-25
Payer: MEDICARE

## 2022-08-25 DIAGNOSIS — Z86.2 HISTORY OF IRON DEFICIENCY ANEMIA: Primary | ICD-10-CM

## 2022-08-25 NOTE — TELEPHONE ENCOUNTER
----- Message from Linsey Wright sent at 8/25/2022 11:14 AM CDT -----  Contact: Patient  Type:  Patient Returning Call    Who Called:Joyce Marino   Who Left Message for Patient: nurse   Does the patient know what this is regarding?: lab results   Would the patient rather a call back or a response via [facility]?  Call back   Best Call Back Number: [phone]  Additional Information:

## 2022-10-20 ENCOUNTER — PATIENT MESSAGE (OUTPATIENT)
Dept: ADMINISTRATIVE | Facility: HOSPITAL | Age: 77
End: 2022-10-20
Payer: MEDICARE

## 2022-12-19 ENCOUNTER — PATIENT OUTREACH (OUTPATIENT)
Dept: ADMINISTRATIVE | Facility: HOSPITAL | Age: 77
End: 2022-12-19
Payer: MEDICARE

## 2022-12-28 ENCOUNTER — HOSPITAL ENCOUNTER (OUTPATIENT)
Dept: RADIOLOGY | Facility: HOSPITAL | Age: 77
Discharge: HOME | End: 2022-12-28
Attending: NURSE PRACTITIONER
Payer: MEDICARE

## 2022-12-28 DIAGNOSIS — Z12.31 VISIT FOR SCREENING MAMMOGRAM: ICD-10-CM

## 2022-12-28 PROCEDURE — 77063 MAMMO DIGITAL SCREENING BILAT WITH TOMO: ICD-10-PCS | Mod: 26,,, | Performed by: RADIOLOGY

## 2022-12-28 PROCEDURE — 77063 BREAST TOMOSYNTHESIS BI: CPT | Mod: TC

## 2022-12-28 PROCEDURE — 77067 MAMMO DIGITAL SCREENING BILAT WITH TOMO: ICD-10-PCS | Mod: 26,,, | Performed by: RADIOLOGY

## 2022-12-28 PROCEDURE — 77067 SCR MAMMO BI INCL CAD: CPT | Mod: 26,,, | Performed by: RADIOLOGY

## 2022-12-28 PROCEDURE — 77063 BREAST TOMOSYNTHESIS BI: CPT | Mod: 26,,, | Performed by: RADIOLOGY

## 2023-05-16 NOTE — PROGRESS NOTES
HEARING AID CHECK    Audiology  Hearing Aid    :  Phonak Audeo P70-RT (champagne)  Date of purchase/dispense date: 04/03/2023     REPAIR WARRANTY EXPIRATION DATE: 05/20/2025   Loss and Damage expiration date: 05/20/2025  [employer] expiration date: 04/03/2025    Serial # right: [id number]  //  Serial # left: [id number]  Payor: Private pay    Battery size: Rechargeable  Dome: Right: Open 4.0 medium  //  Left: Open 4.0 medium  Supplies/wax filters: Cerushield  /retention loop: 2M    Accessory: Phonak   ---Accessory serial # [id number]   ---Accessory warranty expiration date: 05/20/2025    Accessory: Phonak Charge and Go Battery Pack  ---Accessory serial # [id number]       used: [employer]    Updated by ADAM Cruz on 4/4/2023    SUBJECTIVE:  Reason for visit: Patient was seen for his first in trial hearing aid check. He reports things are going well overall with his hearing aids.    OBJECTIVE:  Services provided:   Hearing aids were cleaned and checked. Listening check revealed clear amplification free from distortion.    Hearing aids were connected to the [employer] Target software. Feedback manager was rerun. Real ear measures were completed and following adjustment the hearing aids demonstrated good approximation to targets. Following real ear measurements, the patient felt the hearing aids were a little loud. Target was decreased to 90%, and 6000 Hz was decreased by 3 steps due to reports of a tinny sound quality. After these adjustments, the patient reported the volume was more comfortable and the sound quality was improved. He declined any further programming changes.    Reviewed streaming of phone calls with the patient's hearing aids. A phone call was demonstrated in office.    FOLLOW-UP:  Return for hearing aid check as scheduled.  Contact the clinic with any questions or concerns.    Charleen Galvan     Pre visit chart audit performed.   oriented to person, place and time

## 2023-11-01 DIAGNOSIS — I10 HYPERTENSION, UNSPECIFIED TYPE: ICD-10-CM

## 2023-11-01 RX ORDER — HYDROCHLOROTHIAZIDE 12.5 MG/1
TABLET ORAL
Qty: 90 TABLET | Refills: 3 | OUTPATIENT
Start: 2023-11-01

## 2023-11-01 RX ORDER — POTASSIUM CHLORIDE 1500 MG/1
TABLET, EXTENDED RELEASE ORAL
Qty: 90 TABLET | Refills: 3 | OUTPATIENT
Start: 2023-11-01

## 2023-11-01 RX ORDER — LOSARTAN POTASSIUM 50 MG/1
TABLET ORAL
Qty: 90 TABLET | Refills: 3 | OUTPATIENT
Start: 2023-11-01

## 2023-11-01 NOTE — TELEPHONE ENCOUNTER
Care Due:                  Date            Visit Type   Department     Provider  --------------------------------------------------------------------------------    Last Visit: None Found      None         None Found  Next Visit: None Scheduled  None         None Found                                                            Last  Test          Frequency    Reason                     Performed    Due Date  --------------------------------------------------------------------------------    Office Visit  15 months..  hydroCHLOROthiazide,       Not Found    Overdue                             losartan.................    CMP.........  12 months..  hydroCHLOROthiazide,       06- 05-                             losartan.................    [employer] Embedded Care Due Messages. Reference number: [id number].   11/01/2023 12:32:26 AM CDT

## 2023-11-13 ENCOUNTER — OFFICE VISIT (OUTPATIENT)
Dept: FAMILY MEDICINE | Facility: CLINIC | Age: 78
End: 2023-11-13
Payer: MEDICARE

## 2023-11-13 ENCOUNTER — LAB VISIT (OUTPATIENT)
Dept: LAB | Facility: HOSPITAL | Age: 78
End: 2023-11-13
Attending: FAMILY MEDICINE
Payer: MEDICARE

## 2023-11-13 VITALS
HEART RATE: 77 BPM | DIASTOLIC BLOOD PRESSURE: 82 MMHG | SYSTOLIC BLOOD PRESSURE: 138 MMHG | WEIGHT: 133.5 LBS | OXYGEN SATURATION: 98 % | BODY MASS INDEX: 21.55 KG/M2

## 2023-11-13 DIAGNOSIS — E78.2 MIXED HYPERLIPIDEMIA: Primary | ICD-10-CM

## 2023-11-13 DIAGNOSIS — D50.9 IRON DEFICIENCY ANEMIA, UNSPECIFIED IRON DEFICIENCY ANEMIA TYPE: ICD-10-CM

## 2023-11-13 DIAGNOSIS — Z12.31 BREAST CANCER SCREENING BY MAMMOGRAM: ICD-10-CM

## 2023-11-13 DIAGNOSIS — G89.29 CHRONIC LEFT SHOULDER PAIN: ICD-10-CM

## 2023-11-13 DIAGNOSIS — I10 HYPERTENSION, UNSPECIFIED TYPE: ICD-10-CM

## 2023-11-13 DIAGNOSIS — K29.70 GASTRITIS, PRESENCE OF BLEEDING UNSPECIFIED, UNSPECIFIED CHRONICITY, UNSPECIFIED GASTRITIS TYPE: ICD-10-CM

## 2023-11-13 DIAGNOSIS — M25.512 CHRONIC LEFT SHOULDER PAIN: ICD-10-CM

## 2023-11-13 DIAGNOSIS — R11.0 NAUSEA: ICD-10-CM

## 2023-11-13 LAB
ALBUMIN SERPL BCP-MCNC: 3.9 G/DL (ref 3.5–5.2)
ALP SERPL-CCNC: 101 U/L (ref 55–135)
ALT SERPL W/O P-5'-P-CCNC: 19 U/L (ref 10–44)
ANION GAP SERPL CALC-SCNC: 8 MMOL/L (ref 8–16)
AST SERPL-CCNC: 18 U/L (ref 10–40)
BASOPHILS # BLD AUTO: 0.09 K/UL (ref 0–0.2)
BASOPHILS NFR BLD: 1.6 % (ref 0–1.9)
BILIRUB SERPL-MCNC: 1.1 MG/DL (ref 0.1–1)
BUN SERPL-MCNC: 17 MG/DL (ref 8–23)
CALCIUM SERPL-MCNC: 10.7 MG/DL (ref 8.7–10.5)
CHLORIDE SERPL-SCNC: 105 MMOL/L (ref 95–110)
CHOLEST SERPL-MCNC: 218 MG/DL (ref 120–199)
CHOLEST/HDLC SERPL: 3.5 {RATIO} (ref 2–5)
CO2 SERPL-SCNC: 28 MMOL/L (ref 23–29)
CREAT SERPL-MCNC: 0.7 MG/DL (ref 0.5–1.4)
DIFFERENTIAL METHOD: NORMAL
EOSINOPHIL # BLD AUTO: 0.3 K/UL (ref 0–0.5)
EOSINOPHIL NFR BLD: 6 % (ref 0–8)
ERYTHROCYTE [DISTWIDTH] IN BLOOD BY AUTOMATED COUNT: 12.8 % (ref 11.5–14.5)
EST. GFR  (NO RACE VARIABLE): >60 ML/MIN/1.73 M^2
ESTIMATED AVG GLUCOSE: 103 MG/DL (ref 68–131)
GLUCOSE SERPL-MCNC: 98 MG/DL (ref 70–110)
HBA1C MFR BLD: 5.2 % (ref 4–5.6)
HCT VFR BLD AUTO: 38.8 % (ref 37–48.5)
HDLC SERPL-MCNC: 63 MG/DL (ref 40–75)
HDLC SERPL: 28.9 % (ref 20–50)
HGB BLD-MCNC: 12.7 G/DL (ref 12–16)
IMM GRANULOCYTES # BLD AUTO: 0.02 K/UL (ref 0–0.04)
IMM GRANULOCYTES NFR BLD AUTO: 0.4 % (ref 0–0.5)
IRON SERPL-MCNC: 87 UG/DL (ref 30–160)
LDLC SERPL CALC-MCNC: 123.6 MG/DL (ref 63–159)
LYMPHOCYTES # BLD AUTO: 1.4 K/UL (ref 1–4.8)
LYMPHOCYTES NFR BLD: 24.5 % (ref 18–48)
MCH RBC QN AUTO: 28.9 PG (ref 27–31)
MCHC RBC AUTO-ENTMCNC: 32.7 G/DL (ref 32–36)
MCV RBC AUTO: 88 FL (ref 82–98)
MONOCYTES # BLD AUTO: 0.6 K/UL (ref 0.3–1)
MONOCYTES NFR BLD: 10.7 % (ref 4–15)
NEUTROPHILS # BLD AUTO: 3.1 K/UL (ref 1.8–7.7)
NEUTROPHILS NFR BLD: 56.8 % (ref 38–73)
NONHDLC SERPL-MCNC: 155 MG/DL
NRBC BLD-RTO: 0 /100 WBC
PLATELET # BLD AUTO: 291 K/UL (ref 150–450)
PMV BLD AUTO: 11.1 FL (ref 9.2–12.9)
POTASSIUM SERPL-SCNC: 4.1 MMOL/L (ref 3.5–5.1)
PROT SERPL-MCNC: 7.2 G/DL (ref 6–8.4)
RBC # BLD AUTO: 4.4 M/UL (ref 4–5.4)
SATURATED IRON: 21 % (ref 20–50)
SODIUM SERPL-SCNC: 141 MMOL/L (ref 136–145)
TOTAL IRON BINDING CAPACITY: 423 UG/DL (ref 250–450)
TRANSFERRIN SERPL-MCNC: 286 MG/DL (ref 200–375)
TRIGL SERPL-MCNC: 157 MG/DL (ref 30–150)
WBC # BLD AUTO: 5.51 K/UL (ref 3.9–12.7)

## 2023-11-13 PROCEDURE — 3079F PR MOST RECENT DIASTOLIC BLOOD PRESSURE 80-89 MM HG: ICD-10-PCS | Mod: CPTII,S$GLB,, | Performed by: FAMILY MEDICINE

## 2023-11-13 PROCEDURE — 1126F PR PAIN SEVERITY QUANTIFIED, NO PAIN PRESENT: ICD-10-PCS | Mod: CPTII,S$GLB,, | Performed by: FAMILY MEDICINE

## 2023-11-13 PROCEDURE — 1101F PR PT FALLS ASSESS DOC 0-1 FALLS W/OUT INJ PAST YR: ICD-10-PCS | Mod: CPTII,S$GLB,, | Performed by: FAMILY MEDICINE

## 2023-11-13 PROCEDURE — 99214 OFFICE O/P EST MOD 30 MIN: CPT | Mod: S$GLB,,, | Performed by: FAMILY MEDICINE

## 2023-11-13 PROCEDURE — 3288F FALL RISK ASSESSMENT DOCD: CPT | Mod: CPTII,S$GLB,, | Performed by: FAMILY MEDICINE

## 2023-11-13 PROCEDURE — 80053 COMPREHEN METABOLIC PANEL: CPT | Performed by: FAMILY MEDICINE

## 2023-11-13 PROCEDURE — 84466 ASSAY OF TRANSFERRIN: CPT | Performed by: FAMILY MEDICINE

## 2023-11-13 PROCEDURE — 3288F PR FALLS RISK ASSESSMENT DOCUMENTED: ICD-10-PCS | Mod: CPTII,S$GLB,, | Performed by: FAMILY MEDICINE

## 2023-11-13 PROCEDURE — 83036 HEMOGLOBIN GLYCOSYLATED A1C: CPT | Performed by: FAMILY MEDICINE

## 2023-11-13 PROCEDURE — 1126F AMNT PAIN NOTED NONE PRSNT: CPT | Mod: CPTII,S$GLB,, | Performed by: FAMILY MEDICINE

## 2023-11-13 PROCEDURE — 83540 ASSAY OF IRON: CPT | Performed by: FAMILY MEDICINE

## 2023-11-13 PROCEDURE — 85025 COMPLETE CBC W/AUTO DIFF WBC: CPT | Performed by: FAMILY MEDICINE

## 2023-11-13 PROCEDURE — 80061 LIPID PANEL: CPT | Performed by: FAMILY MEDICINE

## 2023-11-13 PROCEDURE — 1159F MED LIST DOCD IN RCRD: CPT | Mod: CPTII,S$GLB,, | Performed by: FAMILY MEDICINE

## 2023-11-13 PROCEDURE — 1159F PR MEDICATION LIST DOCUMENTED IN MEDICAL RECORD: ICD-10-PCS | Mod: CPTII,S$GLB,, | Performed by: FAMILY MEDICINE

## 2023-11-13 PROCEDURE — 3075F PR MOST RECENT SYSTOLIC BLOOD PRESS GE 130-139MM HG: ICD-10-PCS | Mod: CPTII,S$GLB,, | Performed by: FAMILY MEDICINE

## 2023-11-13 PROCEDURE — 99214 PR OFFICE/OUTPT VISIT, EST, LEVL IV, 30-39 MIN: ICD-10-PCS | Mod: S$GLB,,, | Performed by: FAMILY MEDICINE

## 2023-11-13 PROCEDURE — 36415 COLL VENOUS BLD VENIPUNCTURE: CPT | Mod: PO | Performed by: FAMILY MEDICINE

## 2023-11-13 PROCEDURE — 99999 PR PBB SHADOW E&M-EST. PATIENT-LVL III: ICD-10-PCS | Mod: PBBFAC,,, | Performed by: FAMILY MEDICINE

## 2023-11-13 PROCEDURE — 99999 PR PBB SHADOW E&M-EST. PATIENT-LVL III: CPT | Mod: PBBFAC,,, | Performed by: FAMILY MEDICINE

## 2023-11-13 PROCEDURE — 3079F DIAST BP 80-89 MM HG: CPT | Mod: CPTII,S$GLB,, | Performed by: FAMILY MEDICINE

## 2023-11-13 PROCEDURE — 1101F PT FALLS ASSESS-DOCD LE1/YR: CPT | Mod: CPTII,S$GLB,, | Performed by: FAMILY MEDICINE

## 2023-11-13 PROCEDURE — 3075F SYST BP GE 130 - 139MM HG: CPT | Mod: CPTII,S$GLB,, | Performed by: FAMILY MEDICINE

## 2023-11-13 RX ORDER — HYDROCHLOROTHIAZIDE 12.5 MG/1
12.5 TABLET ORAL DAILY
Qty: 90 TABLET | Refills: 3 | Status: SHIPPED | OUTPATIENT
Start: 2023-11-13 | End: 2024-11-12

## 2023-11-13 RX ORDER — HYDROCHLOROTHIAZIDE 12.5 MG/1
12.5 TABLET ORAL DAILY
Qty: 90 TABLET | Refills: 3 | Status: SHIPPED | OUTPATIENT
Start: 2023-11-13 | End: 2023-11-13 | Stop reason: SDUPTHER

## 2023-11-13 RX ORDER — LOSARTAN POTASSIUM 50 MG/1
50 TABLET ORAL DAILY
Qty: 90 TABLET | Refills: 3 | Status: SHIPPED | OUTPATIENT
Start: 2023-11-13 | End: 2023-11-13 | Stop reason: SDUPTHER

## 2023-11-13 RX ORDER — POTASSIUM CHLORIDE 20 MEQ/1
20 TABLET, EXTENDED RELEASE ORAL DAILY
Qty: 90 TABLET | Refills: 3 | Status: SHIPPED | OUTPATIENT
Start: 2023-11-13 | End: 2024-11-12

## 2023-11-13 RX ORDER — LOSARTAN POTASSIUM 50 MG/1
50 TABLET ORAL DAILY
Qty: 90 TABLET | Refills: 3 | Status: SHIPPED | OUTPATIENT
Start: 2023-11-13

## 2023-11-13 RX ORDER — OMEPRAZOLE 20 MG/1
20 CAPSULE, DELAYED RELEASE ORAL DAILY
Qty: 90 CAPSULE | Refills: 3 | Status: SHIPPED | OUTPATIENT
Start: 2023-11-13

## 2023-11-13 RX ORDER — POTASSIUM CHLORIDE 20 MEQ/1
20 TABLET, EXTENDED RELEASE ORAL DAILY
Qty: 90 TABLET | Refills: 3 | Status: SHIPPED | OUTPATIENT
Start: 2023-11-13 | End: 2023-11-13 | Stop reason: SDUPTHER

## 2023-11-13 NOTE — PROGRESS NOTES
Chief Complaint:    Chief Complaint   Patient presents with    Follow-up     Med refill       History of Present Illness:  Presents today for follow-up, last visit December 2020.    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     Blood pressure is okay today    Left Shoulder pain with no radiation. Says it has gotten better and just thinks it was a little sprain. Has some chronic pain to Right shoulder.     Due for labs.     Suffers with chronic ankle pain has seen orthopedics in the past and was told that she has arthritis takes meloxicam as needed.      ROS:  Review of Systems   Constitutional:  Negative for activity change, chills, fatigue, fever and unexpected weight change.   HENT:  Negative for congestion, ear discharge, ear pain, hearing loss, postnasal drip and rhinorrhea.    Eyes:  Negative for pain and visual disturbance.   Respiratory:  Negative for cough, chest tightness and shortness of breath.    Cardiovascular:  Negative for chest pain and palpitations.   Gastrointestinal:  Negative for abdominal pain, diarrhea and vomiting.   Endocrine: Negative for heat intolerance.   Genitourinary:  Negative for dysuria, flank pain, frequency and hematuria.   Musculoskeletal:  Negative for back pain, gait problem and neck pain.   Skin:  Negative for color change and rash.   Neurological:  Negative for dizziness, tremors, seizures, numbness and headaches.   Psychiatric/Behavioral:  Negative for agitation, hallucinations, self-injury, sleep disturbance and suicidal ideas. The patient is not nervous/anxious.        Past Medical History:   Diagnosis Date    Arthritis     Asthma     Hyperlipidemia     Hypertension        Social History:  Social History     Socioeconomic History    Marital status: Single   Tobacco Use    Smoking status: Never    Smokeless tobacco: Never   Substance and Sexual Activity    Alcohol use: No    Drug use: No    Sexual activity: Never     Partners: Male     Birth control/protection: None        Family History:   family history includes Emphysema in her mother; Heart disease in her father and mother; Lung cancer in her maternal grandfather.    Health Maintenance   Topic Date Due    Shingles Vaccine (2 of 3) 05/08/2012    Lipid Panel  12/09/2021    Mammogram  12/28/2023    DEXA Scan  11/13/2023 (Originally 10/6/2019)    TETANUS VACCINE  04/04/2024    Hepatitis C Screening  Completed       Physical Exam:    Vital Signs  Pulse: 77  SpO2: 98 %  BP: 138/82  BP Location: Left arm  Patient Position: Sitting  Pain Score: 0-No pain  Height and Weight  Weight: 60.6 kg (133 lb 7.8 oz)]    Body mass index is 21.55 kg/m².    Physical Exam  Vitals and nursing note reviewed.   Constitutional:       Appearance: Normal appearance. She is not toxic-appearing.   HENT:      Head: Normocephalic and atraumatic.      Right Ear: Tympanic membrane normal.      Left Ear: Tympanic membrane normal.   Eyes:      Extraocular Movements: Extraocular movements intact.      Pupils: Pupils are equal, round, and reactive to light.   Cardiovascular:      Rate and Rhythm: Normal rate and regular rhythm.      Heart sounds: Murmur heard.   Pulmonary:      Effort: Pulmonary effort is normal.      Breath sounds: Normal breath sounds. No wheezing, rhonchi or rales.   Abdominal:      General: Bowel sounds are normal. There is no distension.      Palpations: Abdomen is soft.      Tenderness: There is no abdominal tenderness.   Musculoskeletal:         General: Normal range of motion.      Left shoulder: Tenderness present. Decreased strength.        Arms:       Cervical back: Normal range of motion.      Lumbar back: No tenderness.      Comments: Lateral anterior left rotator cuff tenderness.     Shoulder impingement test positive left.    Very minimal strength with lateral rotation of left shoulder.    Skin:     General: Skin is warm and dry.      Capillary Refill: Capillary refill takes less than 2 seconds.   Neurological:      General: No  "focal deficit present.      Mental Status: She is alert and oriented to person, place, and time.   Psychiatric:         Mood and Affect: Mood normal.         Behavior: Behavior normal.         Judgment: Judgment normal.           Assessment:      ICD-10-CM ICD-9-CM   1. Mixed hyperlipidemia  E78.2 272.2   2. Hypertension, unspecified type  I10 401.9   3. Chronic left shoulder pain  M25.512 719.41    G89.29 338.29   4. Iron deficiency anemia, unspecified iron deficiency anemia type  D50.9 280.9   5. Breast cancer screening by mammogram  Z12.31 V76.12         Plan:  Not interested in physical therapy at the moment. Would like to do some at home exercises first for left shoulder pain.  Look up "[facility] physical therapy" on youtube.    Schedule mammogram for January 2024.   See labs below. Will determine best course if needed after results.    Follow-up once yearly    Orders Placed This Encounter   Procedures    Mammo Digital Screening Bilat    CBC Auto Differential    Comprehensive Metabolic Panel    Hemoglobin A1C    Lipid Panel    Iron and TIBC       Current Outpatient Medications   Medication Sig Dispense Refill    ferrous sulfate 325 mg (65 mg iron) Tab tablet Take 1 tablet (325 mg total) by mouth daily with breakfast. 60 tablet 0    glucosamine HCl-msm-chondroitn 750-375-400 mg Tab Take 1 tablet by mouth 2 (two) times daily.      Lactobacillus acidophilus 10 billion cell Cap Take 1 capsule by mouth once daily.      multivitamin with minerals tablet Take 1 tablet by mouth once daily.      omeprazole (PRILOSEC) 20 MG capsule Take 1 capsule (20 mg total) by mouth once daily. 30 capsule 0    traMADoL (ULTRAM) 50 mg tablet Take 1 tablet (50 mg total) by mouth every 6 (six) hours as needed for Pain. 30 tablet 3    turmeric root extract 500 mg Cap Take 1 capsule by mouth 2 (two) times daily.      hydroCHLOROthiazide (HYDRODIURIL) 12.5 MG Tab Take 1 tablet (12.5 mg total) by mouth once daily. 90 tablet 3    losartan " (COZAAR) 50 MG tablet Take 1 tablet (50 mg total) by mouth once daily. 90 tablet 3    potassium chloride SA (KLOR-CON M20) 20 MEQ tablet Take 1 tablet (20 mEq total) by mouth once daily. 90 tablet 3     Current Facility-Administered Medications   Medication Dose Route Frequency Provider Last Rate Last Admin    denosumab (PROLIA) injection 60 mg  60 mg Subcutaneous Q6 Months Yoon Pennington MD   60 mg at 11/04/19 0939       Medications Discontinued During This Encounter   Medication Reason    losartan (COZAAR) 50 MG tablet Reorder    KLOR-CON M20 20 mEq tablet Reorder    hydroCHLOROthiazide (HYDRODIURIL) 12.5 MG Tab Reorder         Follow up in about 1 year (around 11/13/2024).      Porter Vasques MD    Scribe Attestation:   I, Tuan Conroy, am scribing for, and in the presence of, Dr.Arif Vasques I performed the above scribed service and the documentation accurately describes the services I performed. I attest to the accuracy of the note.    I, Dr. Porter Vasques, reviewed documentation as scribed above. I performed the services described in this documentation.  I agree that the record reflects my personal performance and is accurate and complete. Porter Vasques MD.  11/13/2023

## 2024-01-11 ENCOUNTER — HOSPITAL ENCOUNTER (OUTPATIENT)
Dept: RADIOLOGY | Facility: HOSPITAL | Age: 79
Discharge: HOME | End: 2024-01-11
Attending: FAMILY MEDICINE
Payer: MEDICARE

## 2024-01-11 DIAGNOSIS — Z12.31 BREAST CANCER SCREENING BY MAMMOGRAM: ICD-10-CM

## 2024-01-11 PROCEDURE — 77063 BREAST TOMOSYNTHESIS BI: CPT | Mod: 26,,, | Performed by: RADIOLOGY

## 2024-01-11 PROCEDURE — 77067 SCR MAMMO BI INCL CAD: CPT | Mod: 26,,, | Performed by: RADIOLOGY

## 2024-01-11 PROCEDURE — 77067 SCR MAMMO BI INCL CAD: CPT | Mod: TC

## 2024-01-31 DIAGNOSIS — Z78.0 MENOPAUSE: ICD-10-CM

## 2024-04-29 ENCOUNTER — HOSPITAL ENCOUNTER (INPATIENT)
Facility: HOSPITAL | Age: 79
LOS: 3 days | Discharge: HOME | DRG: 378 | End: 2024-05-02
Attending: EMERGENCY MEDICINE | Admitting: STUDENT IN AN ORGANIZED HEALTH CARE EDUCATION/TRAINING PROGRAM
Payer: MEDICARE

## 2024-04-29 DIAGNOSIS — R55 SYNCOPE: ICD-10-CM

## 2024-04-29 DIAGNOSIS — R06.02 SHORTNESS OF BREATH: ICD-10-CM

## 2024-04-29 DIAGNOSIS — K92.1 MELENA: Primary | ICD-10-CM

## 2024-04-29 DIAGNOSIS — S02.85XA ORBITAL FRACTURE, CLOSED, INITIAL ENCOUNTER: ICD-10-CM

## 2024-04-29 DIAGNOSIS — K92.2 GI BLEED: ICD-10-CM

## 2024-04-29 DIAGNOSIS — D62 ACUTE BLOOD LOSS ANEMIA: ICD-10-CM

## 2024-04-29 PROBLEM — I61.9: Status: RESOLVED | Noted: 2024-04-29 | Resolved: 2024-04-29

## 2024-04-29 PROBLEM — I61.9: Status: ACTIVE | Noted: 2024-04-29

## 2024-04-29 PROBLEM — I62.9: Status: ACTIVE | Noted: 2024-04-29

## 2024-04-29 LAB
ABO + RH BLD: NORMAL
ALBUMIN SERPL BCP-MCNC: 3.6 G/DL (ref 3.5–5.2)
ALP SERPL-CCNC: 84 U/L (ref 55–135)
ALT SERPL W/O P-5'-P-CCNC: 11 U/L (ref 10–44)
ANION GAP SERPL CALC-SCNC: 8 MMOL/L (ref 8–16)
APTT PPP: 23.5 SEC (ref 21–32)
AST SERPL-CCNC: 13 U/L (ref 10–40)
BACTERIA #/AREA URNS HPF: ABNORMAL /HPF
BASOPHILS # BLD AUTO: 0.07 K/UL (ref 0–0.2)
BASOPHILS NFR BLD: 0.9 % (ref 0–1.9)
BILIRUB SERPL-MCNC: 1 MG/DL (ref 0.1–1)
BILIRUB UR QL STRIP: NEGATIVE
BLD GP AB SCN CELLS X3 SERPL QL: NORMAL
BLD PROD TYP BPU: NORMAL
BLD PROD TYP BPU: NORMAL
BLOOD UNIT EXPIRATION DATE: NORMAL
BLOOD UNIT EXPIRATION DATE: NORMAL
BLOOD UNIT TYPE CODE: 6200
BLOOD UNIT TYPE CODE: 6200
BLOOD UNIT TYPE: NORMAL
BLOOD UNIT TYPE: NORMAL
BUN SERPL-MCNC: 45 MG/DL (ref 8–23)
CALCIUM SERPL-MCNC: 10.4 MG/DL (ref 8.7–10.5)
CHLORIDE SERPL-SCNC: 107 MMOL/L (ref 95–110)
CLARITY UR: CLEAR
CO2 SERPL-SCNC: 22 MMOL/L (ref 23–29)
CODING SYSTEM: NORMAL
CODING SYSTEM: NORMAL
COLOR UR: YELLOW
CREAT SERPL-MCNC: 0.7 MG/DL (ref 0.5–1.4)
CROSSMATCH INTERPRETATION: NORMAL
CROSSMATCH INTERPRETATION: NORMAL
DIFFERENTIAL METHOD BLD: ABNORMAL
DISPENSE STATUS: NORMAL
DISPENSE STATUS: NORMAL
EOSINOPHIL # BLD AUTO: 0.2 K/UL (ref 0–0.5)
EOSINOPHIL NFR BLD: 2.5 % (ref 0–8)
ERYTHROCYTE [DISTWIDTH] IN BLOOD BY AUTOMATED COUNT: 13.2 % (ref 11.5–14.5)
EST. GFR  (NO RACE VARIABLE): >60 ML/MIN/1.73 M^2
GLUCOSE SERPL-MCNC: 115 MG/DL (ref 70–110)
GLUCOSE UR QL STRIP: NEGATIVE
HCT VFR BLD AUTO: 21.5 % (ref 37–48.5)
HGB BLD-MCNC: 7.1 G/DL (ref 12–16)
HGB UR QL STRIP: ABNORMAL
HYALINE CASTS #/AREA URNS LPF: 4 /LPF
IMM GRANULOCYTES # BLD AUTO: 0.04 K/UL (ref 0–0.04)
IMM GRANULOCYTES NFR BLD AUTO: 0.5 % (ref 0–0.5)
INR PPP: 0.9 (ref 0.8–1.2)
KETONES UR QL STRIP: NEGATIVE
LEUKOCYTE ESTERASE UR QL STRIP: NEGATIVE
LYMPHOCYTES # BLD AUTO: 1.3 K/UL (ref 1–4.8)
LYMPHOCYTES NFR BLD: 15.6 % (ref 18–48)
MCH RBC QN AUTO: 29.1 PG (ref 27–31)
MCHC RBC AUTO-ENTMCNC: 33 G/DL (ref 32–36)
MCV RBC AUTO: 88 FL (ref 82–98)
MICROSCOPIC COMMENT: ABNORMAL
MONOCYTES # BLD AUTO: 0.6 K/UL (ref 0.3–1)
MONOCYTES NFR BLD: 7.4 % (ref 4–15)
NEUTROPHILS # BLD AUTO: 5.9 K/UL (ref 1.8–7.7)
NEUTROPHILS NFR BLD: 73.1 % (ref 38–73)
NITRITE UR QL STRIP: NEGATIVE
NRBC BLD-RTO: 0 /100 WBC
NUM UNITS TRANS PACKED RBC: NORMAL
NUM UNITS TRANS PACKED RBC: NORMAL
OB PNL STL: POSITIVE
OHS QRS DURATION: 98 MS
OHS QTC CALCULATION: 440 MS
PH UR STRIP: 5 [PH] (ref 5–8)
PLATELET # BLD AUTO: 276 K/UL (ref 150–450)
PMV BLD AUTO: 10.3 FL (ref 9.2–12.9)
POTASSIUM SERPL-SCNC: 3.5 MMOL/L (ref 3.5–5.1)
PROT SERPL-MCNC: 6.2 G/DL (ref 6–8.4)
PROT UR QL STRIP: NEGATIVE
PROTHROMBIN TIME: 10.9 SEC (ref 9–12.5)
RBC # BLD AUTO: 2.44 M/UL (ref 4–5.4)
RBC #/AREA URNS HPF: 1 /HPF (ref 0–4)
SODIUM SERPL-SCNC: 137 MMOL/L (ref 136–145)
SP GR UR STRIP: 1.02 (ref 1–1.03)
SPECIMEN OUTDATE: NORMAL
URN SPEC COLLECT METH UR: ABNORMAL
UROBILINOGEN UR STRIP-ACNC: NEGATIVE EU/DL
WBC # BLD AUTO: 8.07 K/UL (ref 3.9–12.7)
WBC #/AREA URNS HPF: 1 /HPF (ref 0–5)

## 2024-04-29 PROCEDURE — 63600175 PHARM REV CODE 636 W HCPCS: Performed by: EMERGENCY MEDICINE

## 2024-04-29 PROCEDURE — 96374 THER/PROPH/DIAG INJ IV PUSH: CPT

## 2024-04-29 PROCEDURE — 93010 ELECTROCARDIOGRAM REPORT: CPT | Mod: ,,, | Performed by: INTERNAL MEDICINE

## 2024-04-29 PROCEDURE — 85730 THROMBOPLASTIN TIME PARTIAL: CPT | Performed by: EMERGENCY MEDICINE

## 2024-04-29 PROCEDURE — 99285 EMERGENCY DEPT VISIT HI MDM: CPT | Mod: 25

## 2024-04-29 PROCEDURE — 81000 URINALYSIS NONAUTO W/SCOPE: CPT | Performed by: EMERGENCY MEDICINE

## 2024-04-29 PROCEDURE — 85610 PROTHROMBIN TIME: CPT | Performed by: EMERGENCY MEDICINE

## 2024-04-29 PROCEDURE — C9113 INJ PANTOPRAZOLE SODIUM, VIA: HCPCS | Performed by: EMERGENCY MEDICINE

## 2024-04-29 PROCEDURE — 99223 1ST HOSP IP/OBS HIGH 75: CPT | Mod: ,,, | Performed by: NEUROLOGICAL SURGERY

## 2024-04-29 PROCEDURE — C9113 INJ PANTOPRAZOLE SODIUM, VIA: HCPCS | Performed by: STUDENT IN AN ORGANIZED HEALTH CARE EDUCATION/TRAINING PROGRAM

## 2024-04-29 PROCEDURE — 82272 OCCULT BLD FECES 1-3 TESTS: CPT | Performed by: EMERGENCY MEDICINE

## 2024-04-29 PROCEDURE — 30233N1 TRANSFUSION OF NONAUTOLOGOUS RED BLOOD CELLS INTO PERIPHERAL VEIN, PERCUTANEOUS APPROACH: ICD-10-PCS | Performed by: STUDENT IN AN ORGANIZED HEALTH CARE EDUCATION/TRAINING PROGRAM

## 2024-04-29 PROCEDURE — 86920 COMPATIBILITY TEST SPIN: CPT | Performed by: EMERGENCY MEDICINE

## 2024-04-29 PROCEDURE — 80053 COMPREHEN METABOLIC PANEL: CPT | Performed by: EMERGENCY MEDICINE

## 2024-04-29 PROCEDURE — 63600175 PHARM REV CODE 636 W HCPCS: Performed by: STUDENT IN AN ORGANIZED HEALTH CARE EDUCATION/TRAINING PROGRAM

## 2024-04-29 PROCEDURE — 85025 COMPLETE CBC W/AUTO DIFF WBC: CPT | Performed by: EMERGENCY MEDICINE

## 2024-04-29 PROCEDURE — 93005 ELECTROCARDIOGRAM TRACING: CPT

## 2024-04-29 PROCEDURE — 25000003 PHARM REV CODE 250: Performed by: STUDENT IN AN ORGANIZED HEALTH CARE EDUCATION/TRAINING PROGRAM

## 2024-04-29 PROCEDURE — 86901 BLOOD TYPING SEROLOGIC RH(D): CPT | Performed by: EMERGENCY MEDICINE

## 2024-04-29 PROCEDURE — 21400001 HC TELEMETRY ROOM

## 2024-04-29 PROCEDURE — 36430 TRANSFUSION BLD/BLD COMPNT: CPT

## 2024-04-29 PROCEDURE — P9016 RBC LEUKOCYTES REDUCED: HCPCS | Performed by: EMERGENCY MEDICINE

## 2024-04-29 RX ORDER — ACETAMINOPHEN 500 MG
500 TABLET ORAL EVERY 6 HOURS PRN
Status: DISCONTINUED | OUTPATIENT
Start: 2024-04-29 | End: 2024-05-02 | Stop reason: HOSPADM

## 2024-04-29 RX ORDER — AMLODIPINE BESYLATE 5 MG/1
5 TABLET ORAL DAILY
Status: DISCONTINUED | OUTPATIENT
Start: 2024-04-29 | End: 2024-05-02 | Stop reason: HOSPADM

## 2024-04-29 RX ORDER — PANTOPRAZOLE SODIUM 40 MG/10ML
80 INJECTION, POWDER, LYOPHILIZED, FOR SOLUTION INTRAVENOUS
Status: COMPLETED | OUTPATIENT
Start: 2024-04-29 | End: 2024-04-29

## 2024-04-29 RX ORDER — PANTOPRAZOLE SODIUM 40 MG/10ML
40 INJECTION, POWDER, LYOPHILIZED, FOR SOLUTION INTRAVENOUS 2 TIMES DAILY
Status: DISCONTINUED | OUTPATIENT
Start: 2024-04-29 | End: 2024-05-02 | Stop reason: HOSPADM

## 2024-04-29 RX ORDER — HYDRALAZINE HYDROCHLORIDE 20 MG/ML
10 INJECTION INTRAMUSCULAR; INTRAVENOUS EVERY 6 HOURS PRN
Status: DISCONTINUED | OUTPATIENT
Start: 2024-04-29 | End: 2024-05-02 | Stop reason: HOSPADM

## 2024-04-29 RX ORDER — LOSARTAN POTASSIUM 50 MG/1
50 TABLET ORAL DAILY
Status: DISCONTINUED | OUTPATIENT
Start: 2024-04-29 | End: 2024-05-02 | Stop reason: HOSPADM

## 2024-04-29 RX ORDER — HYDROCODONE BITARTRATE AND ACETAMINOPHEN 500; 5 MG/1; MG/1
TABLET ORAL
Status: DISCONTINUED | OUTPATIENT
Start: 2024-04-29 | End: 2024-05-02 | Stop reason: HOSPADM

## 2024-04-29 RX ORDER — SODIUM CHLORIDE 0.9 % (FLUSH) 0.9 %
10 SYRINGE (ML) INJECTION
Status: DISCONTINUED | OUTPATIENT
Start: 2024-04-29 | End: 2024-05-02 | Stop reason: HOSPADM

## 2024-04-29 RX ORDER — ONDANSETRON HYDROCHLORIDE 2 MG/ML
4 INJECTION, SOLUTION INTRAVENOUS EVERY 6 HOURS PRN
Status: DISCONTINUED | OUTPATIENT
Start: 2024-04-29 | End: 2024-05-02 | Stop reason: HOSPADM

## 2024-04-29 RX ADMIN — PANTOPRAZOLE SODIUM 40 MG: 40 INJECTION, POWDER, FOR SOLUTION INTRAVENOUS at 09:04

## 2024-04-29 RX ADMIN — AMLODIPINE BESYLATE 5 MG: 5 TABLET ORAL at 06:04

## 2024-04-29 RX ADMIN — LOSARTAN POTASSIUM 50 MG: 50 TABLET, FILM COATED ORAL at 04:04

## 2024-04-29 RX ADMIN — PANTOPRAZOLE SODIUM 80 MG: 40 INJECTION, POWDER, LYOPHILIZED, FOR SOLUTION INTRAVENOUS at 11:04

## 2024-04-29 NOTE — ASSESSMENT & PLAN NOTE
Latest blood pressure and vitals reviewed-     Temp:  [97.9 °F (36.6 °C)-98.9 °F (37.2 °C)]   Pulse:  [73-95]   Resp:  [18-20]   BP: ()/(45-68)   SpO2:  [97 %-100 %] .   Home meds for hypertension were reviewed and noted below.   Hypertension Medications               hydroCHLOROthiazide (HYDRODIURIL) 12.5 MG Tab Take 1 tablet (12.5 mg total) by mouth once daily.    losartan (COZAAR) 50 MG tablet Take 1 tablet (50 mg total) by mouth once daily.          Given dizziness, we will hold hydrochlorothiazide, continue losartan, added amlodipine   Given concerns for possible subacute hemorrhage, we will target blood pressure less than 140/80   Hydralazine p.r.n.

## 2024-04-29 NOTE — ASSESSMENT & PLAN NOTE
CT head showed Acute 1 cm hemorrhage involving the right thalamus. No significant mass effect or midline shift.  ED provider discussed about CT head findings with on-call neurosurgeon Dr. 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  No evidence of intraventricular hemorrhage or hydrocephalus; stated okay keep patient in [place], no acute needs for transfer to [facility] at this point, however recommended close neuro follow up, if any acute neurological changes, then recommended stat imaging, transfer to [facility];   On examination, no neurological deficits noted, strength, sensation intact, denied bowel or bladder incontinence.    Q.4 neurochecks, fall precautions; hold blood thinners   Target blood pressures less than 140/80  Neurosurgery follow up

## 2024-04-29 NOTE — CONSULTS
[facility] - Emergency Dept.  Neurosurgery  Consult Note    E-Consult to Specialist  Consult performed by: Srinivasan Miller MD  Consult ordered by: Kesha Astudillo MD        Subjective:     Chief Complaint/Reason for Admission:  Consult for for evaluation of small thalamic hemorrhage    History of Present Illness:  This is a 78-year-old female who presents to the emergency room in [place] with melena and evidence of a GI bleed.  She acknowledged a fall in hitting her face and head couple of weeks ago.  She was some facial bruising and left orbital fracture.  CT scan shows what appears to be a subacute resolving right thalamic hemorrhage less than 1 cm without any mass effect or intraventricular hemorrhage.    Current Facility-Administered Medications   Medication Dose Route Frequency Provider Last Rate Last Admin    0.9%  NaCl infusion (for blood administration)   Intravenous Q24H PRN Kesha Astudillo MD        denosumab (PROLIA) injection 60 mg  60 mg Subcutaneous Q6 Months Yoon Pennington MD   60 mg at 11/04/19 0939    ondansetron injection 4 mg  4 mg Intravenous Q6H PRN Kesha Astudillo MD         Current Outpatient Medications   Medication Sig Dispense Refill    ferrous sulfate 325 mg (65 mg iron) Tab tablet Take 1 tablet (325 mg total) by mouth daily with breakfast. 60 tablet 0    hydroCHLOROthiazide (HYDRODIURIL) 12.5 MG Tab Take 1 tablet (12.5 mg total) by mouth once daily. 90 tablet 3    Lactobacillus acidophilus 10 billion cell Cap Take 1 capsule by mouth once daily.      losartan (COZAAR) 50 MG tablet Take 1 tablet (50 mg total) by mouth once daily. 90 tablet 3    multivitamin with minerals tablet Take 1 tablet by mouth once daily.      omeprazole (PRILOSEC) 20 MG capsule Take 1 capsule (20 mg total) by mouth once daily. 90 capsule 3    potassium chloride SA (KLOR-CON M20) 20 MEQ tablet Take 1 tablet (20 mEq total) by mouth once daily. 90 tablet 3    glucosamine HCl-msm-chondroitn 750-375-400 mg  Tab Take 1 tablet by mouth 2 (two) times daily.      turmeric root extract 500 mg Cap Take 1 capsule by mouth 2 (two) times daily.         Review of patient's allergies indicates:   Allergen Reactions    Statins-hmg-coa reductase inhibitors Other (See Comments)     Pt reports muscle spasms when taking med       Past Medical History:   Diagnosis Date    Arthritis     Asthma     Hyperlipidemia     Hypertension      Past Surgical History:   Procedure Laterality Date    COLONOSCOPY N/A 07/21/2022    Procedure: COLONOSCOPY;  Surgeon: Conrado Pierre MD;  Location: [facility];  Service: Endoscopy;  Laterality: N/A;    ESOPHAGOGASTRODUODENOSCOPY N/A 07/21/2022    Procedure: EGD (ESOPHAGOGASTRODUODENOSCOPY);  Surgeon: Conrado Pierre MD;  Location: [facility];  Service: Endoscopy;  Laterality: N/A;    HYSTERECTOMY  1992    for fibroids    OOPHORECTOMY      BSO at time of hysterectomy    michael in right leg       Family History       Problem Relation (Age of Onset)    Emphysema Mother    Heart disease Mother, Father    Lung cancer Maternal Grandfather          Tobacco Use    Smoking status: Never    Smokeless tobacco: Never   Substance and Sexual Activity    Alcohol use: No    Drug use: No    Sexual activity: Never     Partners: Male     Birth control/protection: None     Review of Systems  Objective:     Weight: 61 kg (134 lb 7.7 oz)  Body mass index is 21.71 kg/m².  Vital Signs (Most Recent):  Temp: 98.1 °F (36.7 °C) (04/29/24 1314)  Pulse: 79 (04/29/24 1314)  Resp: 18 (04/29/24 1314)  BP: (!) 105/52 (04/29/24 1314)  SpO2: 99 % (04/29/24 1314) Vital Signs (24h Range):  Temp:  [97.9 °F (36.6 °C)-98.1 °F (36.7 °C)] 98.1 °F (36.7 °C)  Pulse:  [73-79] 79  Resp:  [18-20] 18  SpO2:  [97 %-100 %] 99 %  BP: ()/(45-66) 105/52                              Neurosurgery Physical Exam    Significant Labs:  Recent Labs   Lab 04/29/24  0910   *      K 3.5      CO2 22*   BUN 45*   CREATININE 0.7   CALCIUM  "10.4     Recent Labs   Lab 04/29/24  0910   WBC 8.07   HGB 7.1*   HCT 21.5*        Recent Labs   Lab 04/29/24  0910   INR 0.9   APTT 23.5     Microbiology Results (last 7 days)       ** No results found for the last 168 hours. **          CMP:   Recent Labs   Lab 04/29/24  0910   *   CALCIUM 10.4   ALBUMIN 3.6   PROT 6.2      K 3.5   CO2 22*      BUN 45*   CREATININE 0.7   ALKPHOS 84   ALT 11   AST 13   BILITOT 1.0     CRP: No results for input(s): "CRP" in the last 48 hours.  Recent Lab Results         04/29/24  1047   04/29/24  1046   04/29/24  0920   04/29/24  0910        Unit Blood Type Code     6200  [P]              6200  [P]         Unit Expiration     [id number]  [P]              [id number]  [P]         Unit Blood Type     A POS  [P]              A POS  [P]         Albumin       3.6       ALP       84       ALT       11       Anion Gap       8       Appearance, UA   Clear           PTT       23.5  Comment: Refer to local heparin nomogram for intensity/dose specific   therapeutic   range.         AST       13       Bacteria, UA   Rare           Baso #       0.07       Basophil %       0.9       Bilirubin (UA)   Negative           BILIRUBIN TOTAL       1.0  Comment: For infants and newborns, interpretation of results should be based  on gestational age, weight and in agreement with clinical  observations.    Premature Infant recommended reference ranges:  Up to 24 hours.............<8.0 mg/dL  Up to 48 hours............<12.0 mg/dL  3-5 days..................<15.0 mg/dL  6-29 days.................<15.0 mg/dL         BUN       45       Calcium       10.4       Chloride       107       CO2       22       CODING SYSTEM     [id number]  [P]              [id number]  [P]         Color, UA   Yellow           Creatinine       0.7       Crossmatch Interpretation     Compatible  [P]              Compatible  [P]         Differential Method       Automated       DISPENSE STATUS     " CROSSMATCHED  [P]              ISSUED  [P]         eGFR       >60       Eos #       0.2       Eos %       2.5       Glucose       115       Glucose, UA   Negative           Gran # (ANC)       5.9       Gran %       73.1       Group & Rh     A POS         Hematocrit       21.5       Hemoglobin       7.1       Hyaline Casts, UA   4           Immature Grans (Abs)       0.04  Comment: Mild elevation in immature granulocytes is non specific and   can be seen in a variety of conditions including stress response,   acute inflammation, trauma and pregnancy. Correlation with other   laboratory and clinical findings is essential.         Immature Granulocytes       0.5       INDIRECT SREEKANTH     NEG         INR       0.9  Comment: Coumadin Therapy:  2.0 - 3.0 for INR for all indicators except mechanical heart valves  and antiphospholipid syndromes which should use 2.5 - 3.5.         Ketones, UA   Negative           Leukocyte Esterase, UA   Negative           Lymph #       1.3       Lymph %       15.6       MCH       29.1       MCHC       33.0       MCV       88       Microscopic Comment   SEE COMMENT  Comment: Other formed elements not mentioned in the report are not   present in the microscopic examination.              Mono #       0.6       Mono %       7.4       MPV       10.3       NITRITE UA   Negative           nRBC       0       Occult Blood Positive             Blood, UA   2+           pH, UA   5.0           Platelet Count       276       Potassium       3.5       Product Code     [id number]  [P]              [id number]  [P]         PROTEIN TOTAL       6.2       Protein, UA   Negative  Comment: Recommend a 24 hour urine protein or a urine   protein/creatinine ratio if globulin induced proteinuria is  clinically suspected.             PT       10.9       RBC       2.44       RBC, UA   1           RDW       13.2       Sodium       137       Specific Gravity, UA   1.020           Specimen Outdate     05/02/2024 23:59          Specimen UA   Urine, Clean Catch           UNIT NUMBER     [id number]  [P]              [id number]  [P]         UROBILINOGEN UA   Negative           WBC, UA   1           WBC       8.07                [P] - Preliminary Result             All pertinent labs from the last 24 hours have been reviewed.  Significant Diagnostics:  CT: CT Head Without Contrast    Result Date: 4/29/2024  Acute 1 cm hemorrhage involving the right thalamus.  No significant mass effect or midline shift. This report was flagged in Epic as abnormal. All CT scans at this facility use dose modulation, iterative reconstructions, and/or weight base dosing when appropriate to reduce radiation dose to as low as reasonably achievable. Electronically signed by: Birgit Nash MD Date:    04/29/2024 Time:    11:37   I have reviewed all pertinent imaging results/findings within the past 24 hours.  I have reviewed and interpreted all pertinent imaging results/findings within the past 24 hours.    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  No evidence of intraventricular hemorrhage or hydrocephalus.  This is a nonsurgical lesion.  This has all the characteristics of the subacute hemorrhage therefore this should not interfere with treatment for the GI hemorrhage.  If there is concerns we are going to repeat a CT scan tomorrow.  I do not see any particular reason the patient needs to be transferred down to the main [place] so I am comfortable with patient having her GI issues treated in [place] and we can follow a virtually if needed.  Any neurologic change or worsening neurologic condition do the clinically radiographically, we would be happy the transfer patient back down to [facility].    The patient was cleared for any type of general anesthesia or procedure needed during this hospitalization from the neurosurgical perspective.    I spoke with the ED team  in the comfortable with the plan.      Thank you for your consult. I will follow-up with patient. Please contact us if you have any additional questions.    Srinivasan Miller MD  Neurosurgery  [phone]

## 2024-04-29 NOTE — ED PROVIDER NOTES
Encounter Date: 4/29/2024       History     Chief Complaint   Patient presents with    Melena     C/o weakness dizziness and melena x2-3 days. Stools describes as black in color has been taking Pepto Bismol for nausea constant in nature post fall. Fell and hit head approx 10 days ago and has been having nausea and dizziness consistently since then. Has also been taking Excedrin BID for headaches since the fall. Endorses a Hx iron deficiency anemia and blood transfusion for melena in the presence of clear GI scans.     Patient presents with weakness and melena for the past few days.  She reports the melena started roughly 3 days ago but she did start taking Pepto-Bismol 2 days ago.  She also reports a history anemia in 2022 where she had to get several transfusions.  She was admitted and got a few units.  A colonoscopy and EGD was done a few weeks later per patient but there was no source for the bleeding.  She has not followed up with Gastroenterology since then.  She did have a history of iron-deficiency the past also.  She has not on any iron supplements.    She also reports a fall roughly 10 days ago.  She reports that her dogs ran out and then she tripped over them and hit her head and her face on the side a piece of furniture.  She reports some dizziness when she stands up but goes away after a few minutes.  No headache.  No nausea or vomiting.  No numbness or paresthesias.    She reports baseline she does not have a normal gait due to old ankle injury on the left ankle but she has not noticed any ataxia since her fall.  And she has not off balance.  She does report generalized weakness.    She reports she has not on any blood thinners.  She does not take ibuprofen or anti-inflammatories over-the-counter.      Review of patient's allergies indicates:   Allergen Reactions    Statins-hmg-coa reductase inhibitors Other (See Comments)     Pt reports muscle spasms when taking med     Past Medical History:    Diagnosis Date    Arthritis     Asthma     Hyperlipidemia     Hypertension      Past Surgical History:   Procedure Laterality Date    COLONOSCOPY N/A 07/21/2022    Procedure: COLONOSCOPY;  Surgeon: Conrado Pierre MD;  Location: [facility];  Service: Endoscopy;  Laterality: N/A;    ESOPHAGOGASTRODUODENOSCOPY N/A 07/21/2022    Procedure: EGD (ESOPHAGOGASTRODUODENOSCOPY);  Surgeon: Conrado Pierre MD;  Location: [facility];  Service: Endoscopy;  Laterality: N/A;    HYSTERECTOMY  1992    for fibroids    OOPHORECTOMY      BSO at time of hysterectomy    michael in right leg       Family History   Problem Relation Name Age of Onset    Heart disease Mother      Emphysema Mother      Heart disease Father      Lung cancer Maternal Grandfather       Social History     Tobacco Use    Smoking status: Never    Smokeless tobacco: Never   Substance Use Topics    Alcohol use: No    Drug use: No     Review of Systems   Constitutional:  Negative for fever.   HENT:  Negative for sore throat.    Respiratory:  Negative for shortness of breath.    Cardiovascular:  Negative for chest pain.   Gastrointestinal:  Positive for blood in stool. Negative for abdominal pain, constipation, nausea and vomiting.   Genitourinary:  Negative for dysuria.   Musculoskeletal:  Negative for back pain.   Skin:  Negative for rash.   Neurological:  Positive for dizziness. Negative for seizures, speech difficulty, weakness and numbness.   Hematological:  Does not bruise/bleed easily.   Psychiatric/Behavioral:  Negative for confusion.        Physical Exam     Initial Vitals [04/29/24 0821]   BP Pulse Resp Temp SpO2   (!) 94/45 74 18 97.9 °F (36.6 °C) 100 %      MAP       --         Physical Exam    Nursing note and vitals reviewed.  Constitutional: She appears well-developed and well-nourished.   HENT:   Patient with bruising to the left side of her face.  She is some mild tenderness to the left orbital region.  Extraocular movements are fully intact.    Eyes: Conjunctivae and EOM are normal.   No entrapment   Neck: Neck supple.   Normal range of motion.  Cardiovascular:  Normal rate, regular rhythm and intact distal pulses.           Murmur heard.  Pulmonary/Chest: Breath sounds normal. No respiratory distress. She has no wheezes. She has no rhonchi. She has no rales.   Abdominal: Abdomen is soft. Bowel sounds are normal.   Musculoskeletal:         General: Normal range of motion.      Cervical back: Normal range of motion and neck supple.      Comments: No weakness of her arms or legs.     Neurological: She is alert and oriented to person, place, and time. She has normal strength.   Skin: Skin is warm and dry.   Psychiatric: She has a normal mood and affect. Thought content normal.         ED Course   Critical Care    Date/Time: 4/29/2024 3:59 PM    Performed by: Kesha Astudillo MD  Authorized by: Kesha Astudillo MD  Direct patient critical care time: 10 minutes  Additional history critical care time: 8 minutes  Ordering / reviewing critical care time: 7 minutes  Documentation critical care time: 6 minutes  Consulting other physicians critical care time: 7 minutes  Consult with family critical care time: 6 minutes  Total critical care time (exclusive of procedural time) : 44 minutes  Critical care time was exclusive of separately billable procedures and treating other patients.  Critical care was necessary to treat or prevent imminent or life-threatening deterioration of the following conditions: GI bleed.  Critical care was time spent personally by me on the following activities: blood draw for specimens, development of treatment plan with patient or surrogate, discussions with consultants, interpretation of cardiac output measurements, evaluation of patient's response to treatment, examination of patient, obtaining history from patient or surrogate, ordering and performing treatments and interventions, ordering and review of laboratory studies,  ordering and review of radiographic studies, pulse oximetry, re-evaluation of patient's condition and review of old charts.        Labs Reviewed   CBC W/ AUTO DIFFERENTIAL - Abnormal; Notable for the following components:       Result Value    RBC 2.44 (*)     Hemoglobin 7.1 (*)     Hematocrit 21.5 (*)     Gran % 73.1 (*)     Lymph % 15.6 (*)     All other components within normal limits   COMPREHENSIVE METABOLIC PANEL - Abnormal; Notable for the following components:    CO2 22 (*)     Glucose 115 (*)     BUN 45 (*)     All other components within normal limits   URINALYSIS, REFLEX TO URINE CULTURE - Abnormal; Notable for the following components:    Occult Blood UA 2+ (*)     All other components within normal limits    Narrative:     Specimen Source->Urine   OCCULT BLOOD X 1, STOOL - Abnormal; Notable for the following components:    Occult Blood Positive (*)     All other components within normal limits   URINALYSIS MICROSCOPIC - Abnormal; Notable for the following components:    Hyaline Casts, UA 4 (*)     All other components within normal limits    Narrative:     Specimen Source->Urine   PROTIME-INR   APTT   TYPE & SCREEN   PREPARE RBC SOFT     Results for orders placed or performed during the hospital encounter of 04/29/24   CBC auto differential   Result Value Ref Range    WBC 8.07 3.90 - 12.70 K/uL    RBC 2.44 (L) 4.00 - 5.40 M/uL    Hemoglobin 7.1 (L) 12.0 - 16.0 g/dL    Hematocrit 21.5 (L) 37.0 - 48.5 %    MCV 88 82 - 98 fL    MCH 29.1 27.0 - 31.0 pg    MCHC 33.0 32.0 - 36.0 g/dL    RDW 13.2 11.5 - 14.5 %    Platelets 276 150 - 450 K/uL    MPV 10.3 9.2 - 12.9 fL    Immature Granulocytes 0.5 0.0 - 0.5 %    Gran # (ANC) 5.9 1.8 - 7.7 K/uL    Immature Grans (Abs) 0.04 0.00 - 0.04 K/uL    Lymph # 1.3 1.0 - 4.8 K/uL    Mono # 0.6 0.3 - 1.0 K/uL    Eos # 0.2 0.0 - 0.5 K/uL    Baso # 0.07 0.00 - 0.20 K/uL    nRBC 0 0 /100 WBC    Gran % 73.1 (H) 38.0 - 73.0 %    Lymph % 15.6 (L) 18.0 - 48.0 %    Mono % 7.4 4.0 -  15.0 %    Eosinophil % 2.5 0.0 - 8.0 %    Basophil % 0.9 0.0 - 1.9 %    Differential Method Automated    Comprehensive metabolic panel   Result Value Ref Range    Sodium 137 136 - 145 mmol/L    Potassium 3.5 3.5 - 5.1 mmol/L    Chloride 107 95 - 110 mmol/L    CO2 22 (L) 23 - 29 mmol/L    Glucose 115 (H) 70 - 110 mg/dL    BUN 45 (H) 8 - 23 mg/dL    Creatinine 0.7 0.5 - 1.4 mg/dL    Calcium 10.4 8.7 - 10.5 mg/dL    Total Protein 6.2 6.0 - 8.4 g/dL    Albumin 3.6 3.5 - 5.2 g/dL    Total Bilirubin 1.0 0.1 - 1.0 mg/dL    Alkaline Phosphatase 84 55 - 135 U/L    AST 13 10 - 40 U/L    ALT 11 10 - 44 U/L    eGFR >60 >60 mL/min/1.73 m^2    Anion Gap 8 8 - 16 mmol/L   Protime-INR   Result Value Ref Range    Prothrombin Time 10.9 9.0 - 12.5 sec    INR 0.9 0.8 - 1.2   APTT   Result Value Ref Range    aPTT 23.5 21.0 - 32.0 sec   Urinalysis, Reflex to Urine Culture Urine, Clean Catch    Specimen: Urine   Result Value Ref Range    Specimen UA Urine, Clean Catch     Color, UA Yellow Yellow, Straw, Charmaine    Appearance, UA Clear Clear    pH, UA 5.0 5.0 - 8.0    Specific Gravity, UA 1.020 1.005 - 1.030    Protein, UA Negative Negative    Glucose, UA Negative Negative    Ketones, UA Negative Negative    Bilirubin (UA) Negative Negative    Occult Blood UA 2+ (A) Negative    Nitrite, UA Negative Negative    Urobilinogen, UA Negative <2.0 EU/dL    Leukocytes, UA Negative Negative   Occult blood x 1, stool    Specimen: Stool   Result Value Ref Range    Occult Blood Positive (A) Negative   Urinalysis Microscopic   Result Value Ref Range    RBC, UA 1 0 - 4 /hpf    WBC, UA 1 0 - 5 /hpf    Bacteria Rare None-Occ /hpf    Hyaline Casts, UA 4 (A) 0-1/lpf /lpf    Microscopic Comment SEE COMMENT    CBC Auto Differential   Result Value Ref Range    WBC 8.05 3.90 - 12.70 K/uL    RBC 3.45 (L) 4.00 - 5.40 M/uL    Hemoglobin 10.0 (L) 12.0 - 16.0 g/dL    Hematocrit 29.6 (L) 37.0 - 48.5 %    MCV 86 82 - 98 fL    MCH 29.0 27.0 - 31.0 pg    MCHC 33.8 32.0 -  36.0 g/dL    RDW 13.9 11.5 - 14.5 %    Platelets 252 150 - 450 K/uL    MPV 9.9 9.2 - 12.9 fL    Immature Granulocytes 0.4 0.0 - 0.5 %    Gran # (ANC) 5.1 1.8 - 7.7 K/uL    Immature Grans (Abs) 0.03 0.00 - 0.04 K/uL    Lymph # 1.8 1.0 - 4.8 K/uL    Mono # 0.7 0.3 - 1.0 K/uL    Eos # 0.4 0.0 - 0.5 K/uL    Baso # 0.07 0.00 - 0.20 K/uL    nRBC 0 0 /100 WBC    Gran % 63.1 38.0 - 73.0 %    Lymph % 21.7 18.0 - 48.0 %    Mono % 8.9 4.0 - 15.0 %    Eosinophil % 5.0 0.0 - 8.0 %    Basophil % 0.9 0.0 - 1.9 %    Differential Method Automated    Basic Metabolic Panel   Result Value Ref Range    Sodium 141 136 - 145 mmol/L    Potassium 3.9 3.5 - 5.1 mmol/L    Chloride 109 95 - 110 mmol/L    CO2 25 23 - 29 mmol/L    Glucose 93 70 - 110 mg/dL    BUN 25 (H) 8 - 23 mg/dL    Creatinine 0.6 0.5 - 1.4 mg/dL    Calcium 9.8 8.7 - 10.5 mg/dL    Anion Gap 7 (L) 8 - 16 mmol/L    eGFR >60 >60 mL/min/1.73 m^2   BNP   Result Value Ref Range     (H) 0 - 99 pg/mL   Troponin I   Result Value Ref Range    Troponin I 0.016 0.000 - 0.026 ng/mL   EKG 12-lead   Result Value Ref Range    QRS Duration 98 ms    [facility]HS QTC Calculation 440 ms   Echo   Result Value Ref Range    BSA 1.68 m2    LVOT stroke volume 61.19 cm3    LVIDd 4.25 3.5 - 6.0 cm    LV Systolic Volume 29.45 mL    LV Systolic Volume Index 17.5 mL/m2    LVIDs 2.80 2.1 - 4.0 cm    LV Diastolic Volume 80.77 mL    LV Diastolic Volume Index 48.08 mL/m2    IVS 1.08 0.6 - 1.1 cm    LVOT diameter 1.95 cm    LVOT area 3.0 cm2    FS 34 28 - 44 %    Left Ventricle Relative Wall Thickness 0.48 cm    Posterior Wall 1.03 0.6 - 1.1 cm    LV mass 150.77 g    LV Mass Index 90 g/m2    MV Peak E Renaldo 0.81 m/s    TDI LATERAL 0.10 m/s    TDI SEPTAL 0.08 m/s    E/E' ratio 9.00 m/s    MV Peak A Renaldo 0.95 m/s    TR Max Renaldo 2.61 m/s    E/A ratio 0.85     IVRT 82.78 msec    E wave deceleration time 249.05 msec    LV SEPTAL E/E' RATIO 10.13 m/s    LV LATERAL E/E' RATIO 8.10 m/s    LVOT peak renaldo 0.97 m/s    Left  Ventricular Outflow Tract Mean Velocity 0.72 cm/s    Left Ventricular Outflow Tract Mean Gradient 2.32 mmHg    RVOT peak VTI 22.8 cm    TAPSE 2.24 cm    LA size 3.32 cm    Left Atrium Minor Axis 4.40 cm    Left Atrium Major Axis 5.59 cm    RA Major Axis 4.37 cm    AV mean gradient 29 mmHg    AV peak gradient 39 mmHg    Ao peak shaneka 3.12 m/s    Ao VTI 79.10 cm    LVOT peak VTI 20.50 cm    AV valve area 0.77 cm²    AV Velocity Ratio 0.31     AV index (prosthetic) 0.26     LYNDSEY by Velocity Ratio 0.93 cm²    Triscuspid Valve Regurgitation Peak Gradient 27 mmHg    PV mean gradient 2 mmHg    RVOT peak shaneka 1.00 m/s    Ao root annulus 3.21 cm    STJ 3.12 cm    Ascending aorta 3.31 cm    IVC diameter 1.25 cm    Mean e' 0.09 m/s    ZLVIDS -0.29     ZLVIDD -0.98     LA Volume Index 25.6 mL/m2    LA volume 43.08 cm3    LA WIDTH 3.1 cm    RA Width 3.0 cm    EF 60 %    TV resting pulmonary artery pressure 30 mmHg    RV TB RVSP 6 mmHg    Est. RA pres 3 mmHg   Type & Screen   Result Value Ref Range    Group & Rh A POS     Indirect Rk NEG     Specimen Outdate 05/02/2024 23:59    Prepare RBC 2 Units; Anemia   Result Value Ref Range    UNIT NUMBER [id number]     Product Code R9640G33     DISPENSE STATUS TRANSFUSED     CODING SYSTEM [id number]     Unit Blood Type Code 6200     Unit Blood Type A POS     Unit Expiration [id number]     CROSSMATCH INTERPRETATION Compatible     UNIT NUMBER [id number]     Product Code L6162U32     DISPENSE STATUS TRANSFUSED     CODING SYSTEM [id number]     Unit Blood Type Code 6200     Unit Blood Type A POS     Unit Expiration [id number]     CROSSMATCH INTERPRETATION Compatible        EKG Readings: (Independently Interpreted)   Sinus rhythm at a rate of 86 with frequent PVCs but no acute ST elevation or T-wave change     ECG Results              EKG 12-lead (Final result)        Collection Time Result Time QRS Duration [facility]HS QTC Calculation    04/29/24 08:22:00 04/29/24 20:08:51 98 440                      Final result by Interface, Lab In [facility] (04/29/24 20:08:59)                   Narrative:    Test Reason : R55,    Vent. Rate : 086 BPM     Atrial Rate : 086 BPM     P-R Int : 182 ms          QRS Dur : 098 ms      QT Int : 368 ms       P-R-T Axes : 078 018 062 degrees     QTc Int : 440 ms    Sinus rhythm with frequent Premature ventricular complexes  Possible Lateral infarct ,age undetermined  Abnormal ECG  When compared with ECG of 22-JUN-2022 13:42,  Premature ventricular complexes are now Present  Borderline criteria for Lateral infarct are now Present  Confirmed by ALVINA CORBIN MD ([id number]) on 4/29/2024 8:08:47 PM    Referred By: [email or web address]   SELF           Confirmed By:ALVINA CORBIN MD                                     EKG 12-lead (Final result)  Result time 04/30/24 15:37:25      Final result by Unknown User (04/30/24 15:37:25)                                      Imaging Results               CT Head Without Contrast (Final result)  Result time 04/29/24 11:37:43      Final result by Birgit Nash (Reji), MD (04/29/24 11:37:43)                   Impression:      Acute 1 cm hemorrhage involving the right thalamus.  No significant mass effect or midline shift.    This report was flagged in Epic as abnormal.    All CT scans at this facility use dose modulation, iterative reconstructions, and/or weight base dosing when appropriate to reduce radiation dose to as low as reasonably achievable.      Electronically signed by: Birgit Nash MD  Date:    04/29/2024  Time:    11:37               Narrative:    EXAMINATION:  CT HEAD WITHOUT CONTRAST    CLINICAL HISTORY:  Head trauma, minor (Age >= 65y);    TECHNIQUE:  Noncontrast images were obtained.    COMPARISON:  None    FINDINGS:  There is a focal intra-axial hemorrhage involving the posterior aspect of the right thalamus measuring proximally 1 cm.  No midline shift.  Ventricles are enlarged likely secondary to central atrophy.  The skull is intact.                                        CT Maxillofacial Without Contrast (Final result)  Result time 04/29/24 11:32:26      Final result by Birgit NashReji), MD (04/29/24 11:32:26)                   Impression:      Acute fracture involving the lateral wall left orbit with associated fractures extending through the anterolateral wall the left maxillary sinus and also involving the left infraorbital orbital rim and infraorbital foramen.    All CT scans at this facility use dose modulation, iterative reconstructions, and/or weight base dosing when appropriate to reduce radiation dose to as low as reasonably achievable.      Electronically signed by: Birgit Nash MD  Date:    04/29/2024  Time:    11:32               Narrative:    EXAMINATION:  CT MAXILLOFACIAL WITHOUT CONTRAST    CLINICAL HISTORY:  Facial trauma, blunt;    TECHNIQUE:  Standard axial and coronal facial bone CT performed.    COMPARISON:  None    FINDINGS:  There is acute fracture involving the lateral wall the left orbit.  Associated fractures are seen extending through the anterior wall and lateral walls of the left maxillary sinus.  Fracture also extends through the left infraorbital rim and appears involve the infraorbital foramen.  There is no hemorrhage seen within the maxillary sinus.  No intraorbital hematoma.  The left zygomatic arch is intact.  The pterygoid plates are intact.    The right facial bones are intact.  The nasal bones are intact.    The mandible is intact.                                       Medications   0.9%  NaCl infusion (for blood administration) (has no administration in time range)   ondansetron injection 4 mg (has no administration in time range)   pantoprazole injection 40 mg (40 mg Intravenous Given 4/30/24 0905)   sodium chloride 0.9% flush 10 mL (has no administration in time range)   acetaminophen tablet 500 mg (has no administration in time range)   hydrALAZINE injection 10 mg (has no administration in time range)    losartan tablet 50 mg (50 mg Oral Not Given 4/30/24 0900)   amLODIPine tablet 5 mg (5 mg Oral Not Given 4/30/24 0900)   lactated ringers infusion ( Intravenous New Bag 4/30/24 0905)   sucralfate 100 mg/mL suspension 1 g (1 g Oral Given 4/30/24 1143)   pantoprazole injection 80 mg (80 mg Intravenous Given 4/29/24 1122)     Medical Decision Making  1:31 PM    Pt with GI bleed with melena, Fall 10 days ago with Intracranial bleed and Left orbital fracture    [facility] done for transfer of pt:  Neurosurgery for IC bleed - had fall due to dogs tripping her 10 days ago so bleed could be 10 days old, GCS is 15  Gastroenterology for acute Melena - getting blood transfusion  Ophthalmology - Left Orbital fracture-  EOMI are fully intact with no entrapment       4:03 PM  Pt fell 10 days ago. She has ecchymosis left-sided face. No headache no nausea vomiting she has some dizziness when she stands up. She did not follow-up with anybody at the time of her fall. She came in today due to melena for the past 3 days. She had a GI bleed in 2022. Got several units of blood. Was then worked up as an outpatient for colonoscopy and endoscopy. She reports that they never found a cause for her GI bleed. She has not had any bleeding since then until now. She has not on any blood thinners or anticoagulants or anti-inflammatories.    She has a 1 cm bleed in the brain. No shift. GCS is 15. I did talk to Dr. Miller with Neurosurgery at [facility]. [facility] were no beds at [facility] or [facility]. He looked at the CT scan and feels the IC bleed is old. Recommends that she can not stay here to be worked up for the GI bleed and stabilized from a GI standpoint but will be on board for any consult and if her nearest that is changes any way can always be transferred.    2 units blood has been ordered for the patient    Per Dr. 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      Amount and/or Complexity of Data Reviewed  Independent Historian:      Details: Sisters  Labs: ordered. Decision-making details documented in ED Course.  Radiology: ordered. Decision-making details documented in ED Course.  ECG/medicine tests: ordered. Decision-making details documented in ED Course.  Discussion of management or test interpretation with external provider(s): Hospital medicine services Dr. Padilla will admit pt.  Gastroenterology Dr. Hernández will follow up,  for EGD. Clear liquid diet today, npo after MN. PPI IV BID. Plan for EGD in the am.          Risk  Prescription drug management.  Decision regarding hospitalization.  Risk Details: Hemorrhoids, Peptic ulcers, Tears or inflammation in the esophagus, Diverticulosis and Diverticulitis, Ulcerative colitis and Crohn's disease, Colonic polyps, or cancer in the colon, stomach or esophagus.  Angiodysplasia, Anal fissure                                        Clinical Impression:  Final diagnoses:  [R55] Syncope  [K92.2] GI bleed  [K92.1] Melena (Primary)  [S02.85XA] Orbital fracture, closed, initial encounter          ED Disposition Condition    Admit                 Kesha Astudillo MD  04/30/24 [id number]

## 2024-04-29 NOTE — HPI
78-year-old female with past medical history of hypertension, hyperlipidemia, mild aortic stenosis, GI bleed in 2022 presented to ED due to complaints of dizziness over past 3-4 weeks and dark stools over past 2-3 days.  Status post episode of fall approximately 10 days ago, denied prior syncopal episode, expressed dizziness prior to the episode.  Denied headache, nausea, vomiting, hematemesis.  Expressed taking Excedrin following fall.  Past history of GI bleed in 2022, status post EGD, colonoscopy with no acute findings.  Currently not on any blood thinners.    At baseline, ambulates without assistance, lives alone, gets support from sister.    In ED, CT head showed Acute 1 cm hemorrhage involving the right thalamus. No significant mass effect or midline shift.  ED provider discussed about CT head findings with on-call neurosurgeon Dr. 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  No evidence of intraventricular hemorrhage or hydrocephalus; stated okay keep patient in [place], no acute needs for transfer to [facility] at this point, however recommended close neuro follow up, if any acute neurological changes, then recommended stat imaging, transfer to [facility];   On examination, no neurological deficits noted, strength, sensation intact, denied bowel or bladder incontinence.    For concerns for GI bleed, ordered 2 units of PRBC, GI evaluated and recommended clear liquid diet for today, NPO since midnight for egd in am, PPI 40 mg b.i.d. IV.    Code status- full code

## 2024-04-29 NOTE — ED NOTES
Bed: 14  Expected date: 4/29/24  Expected time:   Means of arrival: Personal Transportation  Comments:

## 2024-04-29 NOTE — PHARMACY MED REC
"Admission Medication History     The home medication history was taken by Olu Dixon.    You may go to "Admission" then "Reconcile Home Medications" tabs to review and/or act upon these items.     The home medication list has been updated by the Pharmacy department.   Please read ALL comments highlighted in yellow.   Please address this information as you see fit.    Feel free to contact us if you have any questions or require assistance.      The medications listed below were removed from the home medication list. Please reorder if appropriate:  Patient reports no longer taking the following medication(s):  TRAMADOL 50MG    Medications listed below were obtained from: Patient/family and Analytic software- [employer]        Olu Dixon  [id number]    Current Outpatient Medications on File Prior to Encounter   Medication Sig Dispense Refill Last Dose    ferrous sulfate 325 mg (65 mg iron) Tab tablet Take 1 tablet (325 mg total) by mouth daily with breakfast. 60 tablet 0 4/28/2024    hydroCHLOROthiazide (HYDRODIURIL) 12.5 MG Tab Take 1 tablet (12.5 mg total) by mouth once daily. 90 tablet 3 4/28/2024    Lactobacillus acidophilus 10 billion cell Cap Take 1 capsule by mouth once daily.   4/28/2024    losartan (COZAAR) 50 MG tablet Take 1 tablet (50 mg total) by mouth once daily. 90 tablet 3 4/28/2024    multivitamin with minerals tablet Take 1 tablet by mouth once daily.   4/28/2024    omeprazole (PRILOSEC) 20 MG capsule Take 1 capsule (20 mg total) by mouth once daily. 90 capsule 3 Past Week    potassium chloride SA (KLOR-CON M20) 20 MEQ tablet Take 1 tablet (20 mEq total) by mouth once daily. 90 tablet 3 4/28/2024    glucosamine HCl-msm-chondroitn 750-375-400 mg Tab Take 1 tablet by mouth 2 (two) times daily.       turmeric root extract 500 mg Cap Take 1 capsule by mouth 2 (two) times daily.       [DISCONTINUED] traMADoL (ULTRAM) 50 mg tablet Take 1 tablet (50 mg total) by mouth every 6 (six) " hours as needed for Pain. 30 tablet 3                            .

## 2024-04-29 NOTE — H&P
[facility] - Emergency Dept.  [facility] Medicine  History & Physical    Patient Name: Joyce Marino  MRN: [id number]  Patient Class: IP- Inpatient  Admission Date: 4/29/2024  Attending Physician: Wero Sher,*   Primary Care Provider: Porter Vasques MD         Patient information was obtained from patient and ER records.     Subjective:     Principal Problem:GI bleed    Chief Complaint:   Chief Complaint   Patient presents with    Melena     C/o weakness dizziness and melena x2-3 days. Stools describes as black in color has been taking Pepto Bismol for nausea constant in nature post fall. Fell and hit head approx 10 days ago and has been having nausea and dizziness consistently since then. Has also been taking Excedrin BID for headaches since the fall. Endorses a Hx iron deficiency anemia and blood transfusion for melena in the presence of clear GI scans.        HPI: 78-year-old female with past medical history of hypertension, hyperlipidemia, mild aortic stenosis, GI bleed in 2022 presented to ED due to complaints of dizziness over past 3-4 weeks and dark stools over past 2-3 days.  Status post episode of fall approximately 10 days ago, denied prior syncopal episode, expressed dizziness prior to the episode.  Denied headache, nausea, vomiting, hematemesis.  Expressed taking Excedrin following fall.  Past history of GI bleed in 2022, status post EGD, colonoscopy with no acute findings.  Currently not on any blood thinners.    At baseline, ambulates without assistance, lives alone, gets support from sister.    In ED, CT head showed Acute 1 cm hemorrhage involving the right thalamus. No significant mass effect or midline shift.  ED provider discussed about CT head findings with on-call neurosurgeon Dr. 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  No evidence of intraventricular hemorrhage or hydrocephalus; stated okay keep patient  in [place], no acute needs for transfer to [facility] at this point, however recommended close neuro follow up, if any acute neurological changes, then recommended stat imaging, transfer to [facility];   On examination, no neurological deficits noted, strength, sensation intact, denied bowel or bladder incontinence.    For concerns for GI bleed, ordered 2 units of PRBC, GI evaluated and recommended clear liquid diet for today, NPO since midnight for egd in am, PPI 40 mg b.i.d. IV.    Code status- full code           Past Medical History:   Diagnosis Date    Arthritis     Asthma     Hyperlipidemia     Hypertension        Past Surgical History:   Procedure Laterality Date    COLONOSCOPY N/A 07/21/2022    Procedure: COLONOSCOPY;  Surgeon: Conrado Pierre MD;  Location: [facility];  Service: Endoscopy;  Laterality: N/A;    ESOPHAGOGASTRODUODENOSCOPY N/A 07/21/2022    Procedure: EGD (ESOPHAGOGASTRODUODENOSCOPY);  Surgeon: Conrado Pierre MD;  Location: [facility];  Service: Endoscopy;  Laterality: N/A;    HYSTERECTOMY  1992    for fibroids    OOPHORECTOMY      BSO at time of hysterectomy    michael in right leg         Review of patient's allergies indicates:   Allergen Reactions    Statins-hmg-coa reductase inhibitors Other (See Comments)     Pt reports muscle spasms when taking med       Current Facility-Administered Medications   Medication Dose Route Frequency Provider Last Rate Last Admin    0.9%  NaCl infusion (for blood administration)   Intravenous Q24H PRN Kesha Astudillo MD        acetaminophen tablet 500 mg  500 mg Oral Q6H PRN Wero Sher MD        amLODIPine tablet 5 mg  5 mg Oral Daily Wero Sher MD        denosumab (PROLIA) injection 60 mg  60 mg Subcutaneous Q6 Months Yoon Pennington MD   60 mg at 11/04/19 0939    hydrALAZINE injection 10 mg  10 mg Intravenous Q6H PRN Wero Sher MD        losartan tablet 50 mg  50 mg Oral Daily Wero Sher  Ana NEAL MD        ondansetron injection 4 mg  4 mg Intravenous Q6H PRN Kesha Astudillo MD        pantoprazole injection 40 mg  40 mg Intravenous BID Wero Sher MD        sodium chloride 0.9% flush 10 mL  10 mL Intravenous PRN Wero Sher MD         Current Outpatient Medications   Medication Sig Dispense Refill    ferrous sulfate 325 mg (65 mg iron) Tab tablet Take 1 tablet (325 mg total) by mouth daily with breakfast. 60 tablet 0    hydroCHLOROthiazide (HYDRODIURIL) 12.5 MG Tab Take 1 tablet (12.5 mg total) by mouth once daily. 90 tablet 3    Lactobacillus acidophilus 10 billion cell Cap Take 1 capsule by mouth once daily.      losartan (COZAAR) 50 MG tablet Take 1 tablet (50 mg total) by mouth once daily. 90 tablet 3    multivitamin with minerals tablet Take 1 tablet by mouth once daily.      omeprazole (PRILOSEC) 20 MG capsule Take 1 capsule (20 mg total) by mouth once daily. 90 capsule 3    potassium chloride SA (KLOR-CON M20) 20 MEQ tablet Take 1 tablet (20 mEq total) by mouth once daily. 90 tablet 3    glucosamine HCl-msm-chondroitn 750-375-400 mg Tab Take 1 tablet by mouth 2 (two) times daily.      turmeric root extract 500 mg Cap Take 1 capsule by mouth 2 (two) times daily.       Family History       Problem Relation (Age of Onset)    Emphysema Mother    Heart disease Mother, Father    Lung cancer Maternal Grandfather          Tobacco Use    Smoking status: Never    Smokeless tobacco: Never   Substance and Sexual Activity    Alcohol use: No    Drug use: No    Sexual activity: Never     Partners: Male     Birth control/protection: None     Review of Systems    Constitutional:  Negative for fever.   HENT:  Negative for sore throat.    Respiratory:  Negative for shortness of breath.    Cardiovascular:  Negative for chest pain.   Gastrointestinal:  Positive for blood in stool. Negative for abdominal pain, constipation, nausea and vomiting.   Genitourinary:  Negative for dysuria.    Musculoskeletal:  Negative for back pain.   Skin:  Negative for rash.   Neurological:  Positive for dizziness. Negative for seizures, speech difficulty, weakness and numbness.   Hematological:  Does not bruise/bleed easily.   Psychiatric/Behavioral:  Negative for confusion.     Objective:     Vital Signs (Most Recent):  Temp: 98.2 °F (36.8 °C) (04/29/24 1559)  Pulse: 95 (04/29/24 1559)  Resp: 18 (04/29/24 1559)  BP: (!) 140/66 (04/29/24 1559)  SpO2: 100 % (04/29/24 1559) Vital Signs (24h Range):  Temp:  [97.9 °F (36.6 °C)-98.9 °F (37.2 °C)] 98.2 °F (36.8 °C)  Pulse:  [73-95] 95  Resp:  [18-20] 18  SpO2:  [97 %-100 %] 100 %  BP: ()/(45-68) 140/66     Weight: 61 kg (134 lb 7.7 oz)  Body mass index is 21.71 kg/m².     Physical Exam      Constitutional: She appears well-developed and well-nourished.   HENT:   Patient with bruising to the left side of her face.  She is some mild tenderness to the left orbital region.  Extraocular movements are fully intact.   Eyes: Conjunctivae and EOM are normal.   No entrapment   Neck: Neck supple.   Normal range of motion.  Cardiovascular:  Normal rate, regular rhythm, normal heart sounds and intact distal pulses.           Pulmonary/Chest: Breath sounds normal. No respiratory distress. She has no wheezes. She has no rhonchi. She has no rales.   Abdominal: Abdomen is soft. Bowel sounds are normal.   Musculoskeletal:         General: Normal range of motion.      Cervical back: Normal range of motion and neck supple.      Comments: No weakness of her arms or legs.      Neurological: She is alert and oriented to person, place, and time. She has normal strength.  Sensation intact, no bowel or bladder incontinence noted.    Skin: Skin is warm and dry.   Psychiatric: She has a normal mood and affect. Thought content normal.            Significant Labs: All pertinent labs within the past 24 hours have been reviewed.  CBC:   Recent Labs   Lab 04/29/24  0910   WBC 8.07   HGB 7.1*   HCT  21.5*        CMP:   Recent Labs   Lab 04/29/24  0910      K 3.5      CO2 22*   *   BUN 45*   CREATININE 0.7   CALCIUM 10.4   PROT 6.2   ALBUMIN 3.6   BILITOT 1.0   ALKPHOS 84   AST 13   ALT 11   ANIONGAP 8       Significant Imaging:     Imaging Results               CT Head Without Contrast (Final result)  Result time 04/29/24 11:37:43      Final result by Birgit Nash MD (Timothy) (04/29/24 11:37:43)                   Impression:      Acute 1 cm hemorrhage involving the right thalamus.  No significant mass effect or midline shift.    This report was flagged in Epic as abnormal.    All CT scans at this facility use dose modulation, iterative reconstructions, and/or weight base dosing when appropriate to reduce radiation dose to as low as reasonably achievable.      Electronically signed by: Birgit Nash MD  Date:    04/29/2024  Time:    11:37               Narrative:    EXAMINATION:  CT HEAD WITHOUT CONTRAST    CLINICAL HISTORY:  Head trauma, minor (Age >= 65y);    TECHNIQUE:  Noncontrast images were obtained.    COMPARISON:  None    FINDINGS:  There is a focal intra-axial hemorrhage involving the posterior aspect of the right thalamus measuring proximally 1 cm.  No midline shift.  Ventricles are enlarged likely secondary to central atrophy.  The skull is intact.                                       CT Maxillofacial Without Contrast (Final result)  Result time 04/29/24 11:32:26      Final result by Birgit Nash MD (Timothy) (04/29/24 11:32:26)                   Impression:      Acute fracture involving the lateral wall left orbit with associated fractures extending through the anterolateral wall the left maxillary sinus and also involving the left infraorbital orbital rim and infraorbital foramen.    All CT scans at this facility use dose modulation, iterative reconstructions, and/or weight base dosing when appropriate to reduce radiation dose to as low as reasonably  achievable.      Electronically signed by: Birgit Nash MD  Date:    04/29/2024  Time:    11:32               Narrative:    EXAMINATION:  CT MAXILLOFACIAL WITHOUT CONTRAST    CLINICAL HISTORY:  Facial trauma, blunt;    TECHNIQUE:  Standard axial and coronal facial bone CT performed.    COMPARISON:  None    FINDINGS:  There is acute fracture involving the lateral wall the left orbit.  Associated fractures are seen extending through the anterior wall and lateral walls of the left maxillary sinus.  Fracture also extends through the left infraorbital rim and appears involve the infraorbital foramen.  There is no hemorrhage seen within the maxillary sinus.  No intraorbital hematoma.  The left zygomatic arch is intact.  The pterygoid plates are intact.    The right facial bones are intact.  The nasal bones are intact.    The mandible is intact.                                     Assessment/Plan:     * GI bleed  Likely secondary to Excedrin use   History of GI bleed in 2022, EGD, colonoscopy at the time did not show acute findings   Patient has been recommended to undergo a video capsule endoscopy in 2022, however did not undergo;   For current concerns for GI bleed, ordered 2 units of PRBC, GI evaluated and recommended clear liquid diet for today, NPO since midnight for egd in am, PPI 40 mg b.i.d. IV.      Subacute intracranial hemorrhage  CT head showed Acute 1 cm hemorrhage involving the right thalamus. No significant mass effect or midline shift.  ED provider discussed about CT head findings with on-call neurosurgeon Dr. 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  No evidence of intraventricular hemorrhage or hydrocephalus; stated okay keep patient in [place], no acute needs for transfer to main [place] at this point, however recommended close neuro follow up, if any acute neurological changes, then recommended stat imaging, transfer  to [facility] [place];   On examination, no neurological deficits noted, strength, sensation intact, denied bowel or bladder incontinence.    Q.4 neurochecks, fall precautions; hold blood thinners   Target blood pressures less than 140/80  Neurosurgery follow up        HTN (hypertension)  Latest blood pressure and vitals reviewed-     Temp:  [97.9 °F (36.6 °C)-98.9 °F (37.2 °C)]   Pulse:  [73-95]   Resp:  [18-20]   BP: ()/(45-68)   SpO2:  [97 %-100 %] .   Home meds for hypertension were reviewed and noted below.   Hypertension Medications               hydroCHLOROthiazide (HYDRODIURIL) 12.5 MG Tab Take 1 tablet (12.5 mg total) by mouth once daily.    losartan (COZAAR) 50 MG tablet Take 1 tablet (50 mg total) by mouth once daily.          Given dizziness, we will hold hydrochlorothiazide, continue losartan, added amlodipine   Given concerns for possible subacute hemorrhage, we will target blood pressure less than 140/80   Hydralazine p.r.n.         VTE Risk Mitigation (From admission, onward)           Ordered     IP VTE HIGH RISK PATIENT  Once         04/29/24 1511     Place sequential compression device  Until discontinued         04/29/24 1511                                    Wero Sher MD  Department of Hospital Medicine  [facility] - Emergency Dept.

## 2024-04-29 NOTE — ASSESSMENT & PLAN NOTE
Likely secondary to Excedrin use   History of GI bleed in 2022, EGD, colonoscopy at the time did not show acute findings   Patient has been recommended to undergo a video capsule endoscopy in 2022, however did not undergo;   For current concerns for GI bleed, ordered 2 units of PRBC, GI evaluated and recommended clear liquid diet for today, NPO since midnight for egd in am, PPI 40 mg b.i.d. IV.

## 2024-04-29 NOTE — SUBJECTIVE & OBJECTIVE
Past Medical History:   Diagnosis Date    Arthritis     Asthma     Hyperlipidemia     Hypertension        Past Surgical History:   Procedure Laterality Date    COLONOSCOPY N/A 07/21/2022    Procedure: COLONOSCOPY;  Surgeon: Conrado Pierre MD;  Location: [facility];  Service: Endoscopy;  Laterality: N/A;    ESOPHAGOGASTRODUODENOSCOPY N/A 07/21/2022    Procedure: EGD (ESOPHAGOGASTRODUODENOSCOPY);  Surgeon: Conrado Pierre MD;  Location: [facility];  Service: Endoscopy;  Laterality: N/A;    HYSTERECTOMY  1992    for fibroids    OOPHORECTOMY      BSO at time of hysterectomy    michael in right leg         Review of patient's allergies indicates:   Allergen Reactions    Statins-hmg-coa reductase inhibitors Other (See Comments)     Pt reports muscle spasms when taking med       Current Facility-Administered Medications   Medication Dose Route Frequency Provider Last Rate Last Admin    0.9%  NaCl infusion (for blood administration)   Intravenous Q24H PRN Kesha Astudillo MD        acetaminophen tablet 500 mg  500 mg Oral Q6H PRN Wero Sher MD        amLODIPine tablet 5 mg  5 mg Oral Daily Wero Sher MD        denosumab (PROLIA) injection 60 mg  60 mg Subcutaneous Q6 Months Yoon Pennington MD   60 mg at 11/04/19 0939    hydrALAZINE injection 10 mg  10 mg Intravenous Q6H PRN Wero Sher MD        losartan tablet 50 mg  50 mg Oral Daily Wero Sher MD        ondansetron injection 4 mg  4 mg Intravenous Q6H PRN Kesha Astudillo MD        pantoprazole injection 40 mg  40 mg Intravenous BID Wero Sher MD        sodium chloride 0.9% flush 10 mL  10 mL Intravenous PRN Wero Sher MD         Current Outpatient Medications   Medication Sig Dispense Refill    ferrous sulfate 325 mg (65 mg iron) Tab tablet Take 1 tablet (325 mg total) by mouth daily with breakfast. 60 tablet 0    hydroCHLOROthiazide (HYDRODIURIL) 12.5 MG Tab Take 1 tablet  (12.5 mg total) by mouth once daily. 90 tablet 3    Lactobacillus acidophilus 10 billion cell Cap Take 1 capsule by mouth once daily.      losartan (COZAAR) 50 MG tablet Take 1 tablet (50 mg total) by mouth once daily. 90 tablet 3    multivitamin with minerals tablet Take 1 tablet by mouth once daily.      omeprazole (PRILOSEC) 20 MG capsule Take 1 capsule (20 mg total) by mouth once daily. 90 capsule 3    potassium chloride SA (KLOR-CON M20) 20 MEQ tablet Take 1 tablet (20 mEq total) by mouth once daily. 90 tablet 3    glucosamine HCl-msm-chondroitn 750-375-400 mg Tab Take 1 tablet by mouth 2 (two) times daily.      turmeric root extract 500 mg Cap Take 1 capsule by mouth 2 (two) times daily.       Family History       Problem Relation (Age of Onset)    Emphysema Mother    Heart disease Mother, Father    Lung cancer Maternal Grandfather          Tobacco Use    Smoking status: Never    Smokeless tobacco: Never   Substance and Sexual Activity    Alcohol use: No    Drug use: No    Sexual activity: Never     Partners: Male     Birth control/protection: None     Review of Systems    Constitutional:  Negative for fever.   HENT:  Negative for sore throat.    Respiratory:  Negative for shortness of breath.    Cardiovascular:  Negative for chest pain.   Gastrointestinal:  Positive for blood in stool. Negative for abdominal pain, constipation, nausea and vomiting.   Genitourinary:  Negative for dysuria.   Musculoskeletal:  Negative for back pain.   Skin:  Negative for rash.   Neurological:  Positive for dizziness. Negative for seizures, speech difficulty, weakness and numbness.   Hematological:  Does not bruise/bleed easily.   Psychiatric/Behavioral:  Negative for confusion.     Objective:     Vital Signs (Most Recent):  Temp: 98.2 °F (36.8 °C) (04/29/24 1559)  Pulse: 95 (04/29/24 1559)  Resp: 18 (04/29/24 1559)  BP: (!) 140/66 (04/29/24 1559)  SpO2: 100 % (04/29/24 1559) Vital Signs (24h Range):  Temp:  [97.9 °F (36.6  °C)-98.9 °F (37.2 °C)] 98.2 °F (36.8 °C)  Pulse:  [73-95] 95  Resp:  [18-20] 18  SpO2:  [97 %-100 %] 100 %  BP: ()/(45-68) 140/66     Weight: 61 kg (134 lb 7.7 oz)  Body mass index is 21.71 kg/m².     Physical Exam      Constitutional: She appears well-developed and well-nourished.   HENT:   Patient with bruising to the left side of her face.  She is some mild tenderness to the left orbital region.  Extraocular movements are fully intact.   Eyes: Conjunctivae and EOM are normal.   No entrapment   Neck: Neck supple.   Normal range of motion.  Cardiovascular:  Normal rate, regular rhythm, normal heart sounds and intact distal pulses.           Pulmonary/Chest: Breath sounds normal. No respiratory distress. She has no wheezes. She has no rhonchi. She has no rales.   Abdominal: Abdomen is soft. Bowel sounds are normal.   Musculoskeletal:         General: Normal range of motion.      Cervical back: Normal range of motion and neck supple.      Comments: No weakness of her arms or legs.      Neurological: She is alert and oriented to person, place, and time. She has normal strength.  Sensation intact, no bowel or bladder incontinence noted.    Skin: Skin is warm and dry.   Psychiatric: She has a normal mood and affect. Thought content normal.            Significant Labs: All pertinent labs within the past 24 hours have been reviewed.  CBC:   Recent Labs   Lab 04/29/24  0910   WBC 8.07   HGB 7.1*   HCT 21.5*        CMP:   Recent Labs   Lab 04/29/24  0910      K 3.5      CO2 22*   *   BUN 45*   CREATININE 0.7   CALCIUM 10.4   PROT 6.2   ALBUMIN 3.6   BILITOT 1.0   ALKPHOS 84   AST 13   ALT 11   ANIONGAP 8       Significant Imaging:     Imaging Results               CT Head Without Contrast (Final result)  Result time 04/29/24 11:37:43      Final result by Birgit Nash MD (Timothy) (04/29/24 11:37:43)                   Impression:      Acute 1 cm hemorrhage involving the right thalamus.  No  significant mass effect or midline shift.    This report was flagged in Epic as abnormal.    All CT scans at this facility use dose modulation, iterative reconstructions, and/or weight base dosing when appropriate to reduce radiation dose to as low as reasonably achievable.      Electronically signed by: Birgit Nash MD  Date:    04/29/2024  Time:    11:37               Narrative:    EXAMINATION:  CT HEAD WITHOUT CONTRAST    CLINICAL HISTORY:  Head trauma, minor (Age >= 65y);    TECHNIQUE:  Noncontrast images were obtained.    COMPARISON:  None    FINDINGS:  There is a focal intra-axial hemorrhage involving the posterior aspect of the right thalamus measuring proximally 1 cm.  No midline shift.  Ventricles are enlarged likely secondary to central atrophy.  The skull is intact.                                       CT Maxillofacial Without Contrast (Final result)  Result time 04/29/24 11:32:26      Final result by [facility]), MD (04/29/24 11:32:26)                   Impression:      Acute fracture involving the lateral wall left orbit with associated fractures extending through the anterolateral wall the left maxillary sinus and also involving the left infraorbital orbital rim and infraorbital foramen.    All CT scans at this facility use dose modulation, iterative reconstructions, and/or weight base dosing when appropriate to reduce radiation dose to as low as reasonably achievable.      Electronically signed by: Birgit Nash MD  Date:    04/29/2024  Time:    11:32               Narrative:    EXAMINATION:  CT MAXILLOFACIAL WITHOUT CONTRAST    CLINICAL HISTORY:  Facial trauma, blunt;    TECHNIQUE:  Standard axial and coronal facial bone CT performed.    COMPARISON:  None    FINDINGS:  There is acute fracture involving the lateral wall the left orbit.  Associated fractures are seen extending through the anterior wall and lateral walls of the left maxillary sinus.  Fracture also extends through the  left infraorbital rim and appears involve the infraorbital foramen.  There is no hemorrhage seen within the maxillary sinus.  No intraorbital hematoma.  The left zygomatic arch is intact.  The pterygoid plates are intact.    The right facial bones are intact.  The nasal bones are intact.    The mandible is intact.

## 2024-04-30 ENCOUNTER — ANESTHESIA (OUTPATIENT)
Dept: ENDOSCOPY | Facility: HOSPITAL | Age: 79
DRG: 378 | End: 2024-04-30
Payer: MEDICARE

## 2024-04-30 ENCOUNTER — ANESTHESIA EVENT (OUTPATIENT)
Dept: ENDOSCOPY | Facility: HOSPITAL | Age: 79
DRG: 378 | End: 2024-04-30
Payer: MEDICARE

## 2024-04-30 PROBLEM — D64.9 NORMOCYTIC ANEMIA: Status: ACTIVE | Noted: 2024-04-30

## 2024-04-30 LAB
ANION GAP SERPL CALC-SCNC: 7 MMOL/L (ref 8–16)
AORTIC ROOT ANNULUS: 3.21 CM
ASCENDING AORTA: 3.31 CM
AV INDEX (PROSTH): 0.26
AV MEAN GRADIENT: 29 MMHG
AV PEAK GRADIENT: 39 MMHG
AV VALVE AREA BY VELOCITY RATIO: 0.93 CM²
AV VALVE AREA: 0.77 CM²
AV VELOCITY RATIO: 0.31
BASOPHILS # BLD AUTO: 0.07 K/UL (ref 0–0.2)
BASOPHILS NFR BLD: 0.9 % (ref 0–1.9)
BNP SERPL-MCNC: 198 PG/ML (ref 0–99)
BSA FOR ECHO PROCEDURE: 1.68 M2
BUN SERPL-MCNC: 25 MG/DL (ref 8–23)
CALCIUM SERPL-MCNC: 9.8 MG/DL (ref 8.7–10.5)
CHLORIDE SERPL-SCNC: 109 MMOL/L (ref 95–110)
CO2 SERPL-SCNC: 25 MMOL/L (ref 23–29)
CREAT SERPL-MCNC: 0.6 MG/DL (ref 0.5–1.4)
CV ECHO LV RWT: 0.48 CM
DIFFERENTIAL METHOD BLD: ABNORMAL
DOP CALC AO PEAK VEL: 3.12 M/S
DOP CALC AO VTI: 79.1 CM
DOP CALC LVOT AREA: 3 CM2
DOP CALC LVOT DIAMETER: 1.95 CM
DOP CALC LVOT PEAK VEL: 0.97 M/S
DOP CALC LVOT STROKE VOLUME: 61.19 CM3
DOP CALC RVOT PEAK VEL: 1 M/S
DOP CALC RVOT VTI: 22.8 CM
DOP CALCLVOT PEAK VEL VTI: 20.5 CM
E WAVE DECELERATION TIME: 249.05 MSEC
E/A RATIO: 0.85
E/E' RATIO: 9 M/S
ECHO LV POSTERIOR WALL: 1.03 CM (ref 0.6–1.1)
EJECTION FRACTION: 60 %
EOSINOPHIL # BLD AUTO: 0.4 K/UL (ref 0–0.5)
EOSINOPHIL NFR BLD: 5 % (ref 0–8)
ERYTHROCYTE [DISTWIDTH] IN BLOOD BY AUTOMATED COUNT: 13.9 % (ref 11.5–14.5)
EST. GFR  (NO RACE VARIABLE): >60 ML/MIN/1.73 M^2
FRACTIONAL SHORTENING: 34 % (ref 28–44)
GLUCOSE SERPL-MCNC: 93 MG/DL (ref 70–110)
HCT VFR BLD AUTO: 29.6 % (ref 37–48.5)
HGB BLD-MCNC: 10 G/DL (ref 12–16)
IMM GRANULOCYTES # BLD AUTO: 0.03 K/UL (ref 0–0.04)
IMM GRANULOCYTES NFR BLD AUTO: 0.4 % (ref 0–0.5)
INTERVENTRICULAR SEPTUM: 1.08 CM (ref 0.6–1.1)
IVC DIAMETER: 1.25 CM
IVRT: 82.78 MSEC
LA MAJOR: 5.59 CM
LA MINOR: 4.4 CM
LA WIDTH: 3.1 CM
LEFT ATRIUM SIZE: 3.32 CM
LEFT ATRIUM VOLUME INDEX: 25.6 ML/M2
LEFT ATRIUM VOLUME: 43.08 CM3
LEFT INTERNAL DIMENSION IN SYSTOLE: 2.8 CM (ref 2.1–4)
LEFT VENTRICLE DIASTOLIC VOLUME INDEX: 48.08 ML/M2
LEFT VENTRICLE DIASTOLIC VOLUME: 80.77 ML
LEFT VENTRICLE MASS INDEX: 90 G/M2
LEFT VENTRICLE SYSTOLIC VOLUME INDEX: 17.5 ML/M2
LEFT VENTRICLE SYSTOLIC VOLUME: 29.45 ML
LEFT VENTRICULAR INTERNAL DIMENSION IN DIASTOLE: 4.25 CM (ref 3.5–6)
LEFT VENTRICULAR MASS: 150.77 G
LV LATERAL E/E' RATIO: 8.1 M/S
LV SEPTAL E/E' RATIO: 10.13 M/S
LVOT MG: 2.32 MMHG
LVOT MV: 0.72 CM/S
LYMPHOCYTES # BLD AUTO: 1.8 K/UL (ref 1–4.8)
LYMPHOCYTES NFR BLD: 21.7 % (ref 18–48)
MCH RBC QN AUTO: 29 PG (ref 27–31)
MCHC RBC AUTO-ENTMCNC: 33.8 G/DL (ref 32–36)
MCV RBC AUTO: 86 FL (ref 82–98)
MONOCYTES # BLD AUTO: 0.7 K/UL (ref 0.3–1)
MONOCYTES NFR BLD: 8.9 % (ref 4–15)
MV PEAK A VEL: 0.95 M/S
MV PEAK E VEL: 0.81 M/S
NEUTROPHILS # BLD AUTO: 5.1 K/UL (ref 1.8–7.7)
NEUTROPHILS NFR BLD: 63.1 % (ref 38–73)
NRBC BLD-RTO: 0 /100 WBC
PISA TR MAX VEL: 2.61 M/S
PLATELET # BLD AUTO: 252 K/UL (ref 150–450)
PMV BLD AUTO: 9.9 FL (ref 9.2–12.9)
POTASSIUM SERPL-SCNC: 3.9 MMOL/L (ref 3.5–5.1)
PV MEAN GRADIENT: 2 MMHG
RA MAJOR: 4.37 CM
RA PRESSURE ESTIMATED: 3 MMHG
RA WIDTH: 3 CM
RBC # BLD AUTO: 3.45 M/UL (ref 4–5.4)
RV TB RVSP: 6 MMHG
SODIUM SERPL-SCNC: 141 MMOL/L (ref 136–145)
STJ: 3.12 CM
TDI LATERAL: 0.1 M/S
TDI SEPTAL: 0.08 M/S
TDI: 0.09 M/S
TR MAX PG: 27 MMHG
TRICUSPID ANNULAR PLANE SYSTOLIC EXCURSION: 2.24 CM
TROPONIN I SERPL DL<=0.01 NG/ML-MCNC: 0.02 NG/ML (ref 0–0.03)
TV REST PULMONARY ARTERY PRESSURE: 30 MMHG
WBC # BLD AUTO: 8.05 K/UL (ref 3.9–12.7)
Z-SCORE OF LEFT VENTRICULAR DIMENSION IN END DIASTOLE: -0.98
Z-SCORE OF LEFT VENTRICULAR DIMENSION IN END SYSTOLE: -0.29

## 2024-04-30 PROCEDURE — 21400001 HC TELEMETRY ROOM

## 2024-04-30 PROCEDURE — 63600175 PHARM REV CODE 636 W HCPCS

## 2024-04-30 PROCEDURE — 25000003 PHARM REV CODE 250: Performed by: STUDENT IN AN ORGANIZED HEALTH CARE EDUCATION/TRAINING PROGRAM

## 2024-04-30 PROCEDURE — 36415 COLL VENOUS BLD VENIPUNCTURE: CPT | Performed by: STUDENT IN AN ORGANIZED HEALTH CARE EDUCATION/TRAINING PROGRAM

## 2024-04-30 PROCEDURE — 43239 EGD BIOPSY SINGLE/MULTIPLE: CPT | Mod: 59 | Performed by: INTERNAL MEDICINE

## 2024-04-30 PROCEDURE — 27201028 HC NEEDLE, SCLERO: Performed by: INTERNAL MEDICINE

## 2024-04-30 PROCEDURE — 88342 IMHCHEM/IMCYTCHM 1ST ANTB: CPT | Performed by: PATHOLOGY

## 2024-04-30 PROCEDURE — 0W3P8ZZ CONTROL BLEEDING IN GASTROINTESTINAL TRACT, VIA NATURAL OR ARTIFICIAL OPENING ENDOSCOPIC: ICD-10-PCS | Performed by: INTERNAL MEDICINE

## 2024-04-30 PROCEDURE — 83880 ASSAY OF NATRIURETIC PEPTIDE: CPT | Performed by: STUDENT IN AN ORGANIZED HEALTH CARE EDUCATION/TRAINING PROGRAM

## 2024-04-30 PROCEDURE — 3E0G8GC INTRODUCTION OF OTHER THERAPEUTIC SUBSTANCE INTO UPPER GI, VIA NATURAL OR ARTIFICIAL OPENING ENDOSCOPIC: ICD-10-PCS | Performed by: INTERNAL MEDICINE

## 2024-04-30 PROCEDURE — 25000003 PHARM REV CODE 250

## 2024-04-30 PROCEDURE — 0DB78ZX EXCISION OF STOMACH, PYLORUS, VIA NATURAL OR ARTIFICIAL OPENING ENDOSCOPIC, DIAGNOSTIC: ICD-10-PCS | Performed by: INTERNAL MEDICINE

## 2024-04-30 PROCEDURE — 27201012 HC FORCEPS, HOT/COLD, DISP: Performed by: INTERNAL MEDICINE

## 2024-04-30 PROCEDURE — 88305 TISSUE EXAM BY PATHOLOGIST: CPT | Mod: 26,,, | Performed by: PATHOLOGY

## 2024-04-30 PROCEDURE — 63600175 PHARM REV CODE 636 W HCPCS: Performed by: STUDENT IN AN ORGANIZED HEALTH CARE EDUCATION/TRAINING PROGRAM

## 2024-04-30 PROCEDURE — 99223 1ST HOSP IP/OBS HIGH 75: CPT | Mod: ,,, | Performed by: PHYSICIAN ASSISTANT

## 2024-04-30 PROCEDURE — 27201038 HC PROBE, BI-POLAR: Performed by: INTERNAL MEDICINE

## 2024-04-30 PROCEDURE — 43270 EGD LESION ABLATION: CPT | Mod: ,,, | Performed by: INTERNAL MEDICINE

## 2024-04-30 PROCEDURE — 88342 IMHCHEM/IMCYTCHM 1ST ANTB: CPT | Mod: 26,,, | Performed by: PATHOLOGY

## 2024-04-30 PROCEDURE — 43239 EGD BIOPSY SINGLE/MULTIPLE: CPT | Mod: 59,,, | Performed by: INTERNAL MEDICINE

## 2024-04-30 PROCEDURE — C9113 INJ PANTOPRAZOLE SODIUM, VIA: HCPCS | Performed by: STUDENT IN AN ORGANIZED HEALTH CARE EDUCATION/TRAINING PROGRAM

## 2024-04-30 PROCEDURE — 84484 ASSAY OF TROPONIN QUANT: CPT | Performed by: STUDENT IN AN ORGANIZED HEALTH CARE EDUCATION/TRAINING PROGRAM

## 2024-04-30 PROCEDURE — 85025 COMPLETE CBC W/AUTO DIFF WBC: CPT | Performed by: STUDENT IN AN ORGANIZED HEALTH CARE EDUCATION/TRAINING PROGRAM

## 2024-04-30 PROCEDURE — 37000008 HC ANESTHESIA 1ST 15 MINUTES: Performed by: INTERNAL MEDICINE

## 2024-04-30 PROCEDURE — 63600175 PHARM REV CODE 636 W HCPCS: Performed by: INTERNAL MEDICINE

## 2024-04-30 PROCEDURE — 37000009 HC ANESTHESIA EA ADD 15 MINS: Performed by: INTERNAL MEDICINE

## 2024-04-30 PROCEDURE — 88305 TISSUE EXAM BY PATHOLOGIST: CPT | Performed by: PATHOLOGY

## 2024-04-30 PROCEDURE — 80048 BASIC METABOLIC PNL TOTAL CA: CPT | Performed by: STUDENT IN AN ORGANIZED HEALTH CARE EDUCATION/TRAINING PROGRAM

## 2024-04-30 PROCEDURE — 43270 EGD LESION ABLATION: CPT | Performed by: INTERNAL MEDICINE

## 2024-04-30 RX ORDER — SODIUM CHLORIDE, SODIUM LACTATE, POTASSIUM CHLORIDE, CALCIUM CHLORIDE 600; 310; 30; 20 MG/100ML; MG/100ML; MG/100ML; MG/100ML
INJECTION, SOLUTION INTRAVENOUS CONTINUOUS
Status: DISCONTINUED | OUTPATIENT
Start: 2024-04-30 | End: 2024-05-01

## 2024-04-30 RX ORDER — PROPOFOL 10 MG/ML
VIAL (ML) INTRAVENOUS
Status: DISCONTINUED | OUTPATIENT
Start: 2024-04-30 | End: 2024-04-30

## 2024-04-30 RX ORDER — EPINEPHRINE 0.1 MG/ML
INJECTION INTRAVENOUS
Status: DISCONTINUED | OUTPATIENT
Start: 2024-04-30 | End: 2024-04-30 | Stop reason: HOSPADM

## 2024-04-30 RX ORDER — LIDOCAINE HYDROCHLORIDE 10 MG/ML
INJECTION, SOLUTION EPIDURAL; INFILTRATION; INTRACAUDAL; PERINEURAL
Status: DISCONTINUED | OUTPATIENT
Start: 2024-04-30 | End: 2024-04-30

## 2024-04-30 RX ORDER — SODIUM CHLORIDE 9 MG/ML
INJECTION, SOLUTION INTRAVENOUS CONTINUOUS
Status: DISCONTINUED | OUTPATIENT
Start: 2024-04-30 | End: 2024-04-30

## 2024-04-30 RX ORDER — SUCRALFATE 1 G/10ML
1 SUSPENSION ORAL EVERY 6 HOURS
Status: DISCONTINUED | OUTPATIENT
Start: 2024-04-30 | End: 2024-05-02 | Stop reason: HOSPADM

## 2024-04-30 RX ORDER — SODIUM CHLORIDE, SODIUM LACTATE, POTASSIUM CHLORIDE, CALCIUM CHLORIDE 600; 310; 30; 20 MG/100ML; MG/100ML; MG/100ML; MG/100ML
INJECTION, SOLUTION INTRAVENOUS CONTINUOUS PRN
Status: DISCONTINUED | OUTPATIENT
Start: 2024-04-30 | End: 2024-04-30

## 2024-04-30 RX ADMIN — SUCRALFATE 1 G: 1 SUSPENSION ORAL at 11:04

## 2024-04-30 RX ADMIN — SUCRALFATE 1 G: 1 SUSPENSION ORAL at 05:04

## 2024-04-30 RX ADMIN — PROPOFOL 20 MG: 10 INJECTION, EMULSION INTRAVENOUS at 10:04

## 2024-04-30 RX ADMIN — SODIUM CHLORIDE, SODIUM LACTATE, POTASSIUM CHLORIDE, AND CALCIUM CHLORIDE: 600; 310; 30; 20 INJECTION, SOLUTION INTRAVENOUS at 10:04

## 2024-04-30 RX ADMIN — PROPOFOL 60 MG: 10 INJECTION, EMULSION INTRAVENOUS at 10:04

## 2024-04-30 RX ADMIN — SODIUM CHLORIDE, POTASSIUM CHLORIDE, SODIUM LACTATE AND CALCIUM CHLORIDE: 600; 310; 30; 20 INJECTION, SOLUTION INTRAVENOUS at 09:04

## 2024-04-30 RX ADMIN — PANTOPRAZOLE SODIUM 40 MG: 40 INJECTION, POWDER, FOR SOLUTION INTRAVENOUS at 08:04

## 2024-04-30 RX ADMIN — PANTOPRAZOLE SODIUM 40 MG: 40 INJECTION, POWDER, FOR SOLUTION INTRAVENOUS at 09:04

## 2024-04-30 RX ADMIN — ACETAMINOPHEN 500 MG: 500 TABLET ORAL at 08:04

## 2024-04-30 RX ADMIN — PROPOFOL 10 MG: 10 INJECTION, EMULSION INTRAVENOUS at 10:04

## 2024-04-30 RX ADMIN — PROPOFOL 20 MG: 10 INJECTION, EMULSION INTRAVENOUS at 11:04

## 2024-04-30 RX ADMIN — LIDOCAINE HYDROCHLORIDE 90 MG: 10 SOLUTION INTRAVENOUS at 10:04

## 2024-04-30 NOTE — ASSESSMENT & PLAN NOTE
At risk for UGI bleed with NSAID use.   She reported black stool but has taken Pepto.  EGD and colonoscopy in 2022 unremarkable.   Will plan on repeat EGD today.   Keep NPO.   Continue Protonix.   Recommend additional anemia workup as outpatient with Hematology.

## 2024-04-30 NOTE — ANESTHESIA PREPROCEDURE EVALUATION
04/30/2024  Joyce Marino is a 78 y.o., female.    Patient Active Problem List   Diagnosis    Osteoarthrosis    Hyperlipidemia    Osteoporosis, post-menopausal    HTN (hypertension)    Statin intolerance    Nonrheumatic aortic valve stenosis    Atypical chest pain    Nonrheumatic aortic valve insufficiency    Myalgia due to HMG CoA reductase inhibitor    Acute blood loss anemia    GI bleed    Subacute intracranial hemorrhage    Normocytic anemia     Past Medical History:   Diagnosis Date    Arthritis     Asthma     Hyperlipidemia     Hypertension      Past Surgical History:   Procedure Laterality Date    COLONOSCOPY N/A 07/21/2022    Procedure: COLONOSCOPY;  Surgeon: Conrado Pierre MD;  Location: [facility];  Service: Endoscopy;  Laterality: N/A;    ESOPHAGOGASTRODUODENOSCOPY N/A 07/21/2022    Procedure: EGD (ESOPHAGOGASTRODUODENOSCOPY);  Surgeon: Conrado Pierre MD;  Location: [facility];  Service: Endoscopy;  Laterality: N/A;    HYSTERECTOMY  1992    for fibroids    OOPHORECTOMY      BSO at time of hysterectomy    michael in right leg           Pre-op Assessment    I have reviewed the Patient Summary Reports.     I have reviewed the Nursing Notes. I have reviewed the NPO Status.   I have reviewed the Medications.     Review of Systems  Anesthesia Hx:  No problems with previous Anesthesia               Denies Personal Hx of Anesthesia complications.                    Social:  No Alcohol Use, Non-Smoker       Cardiovascular:     Hypertension Valvular problems/Murmurs, AS          hyperlipidemia                             Pulmonary:    Asthma                    Neurological:   CVA                                      Results for orders placed or performed during the hospital encounter of 04/29/24   EKG 12-lead    Collection Time: 04/29/24  8:22 AM   Result Value Ref Range    QRS Duration 98 ms     OHS QTC Calculation 440 ms    Narrative    Test Reason : R55,    Vent. Rate : 086 BPM     Atrial Rate : 086 BPM     P-R Int : 182 ms          QRS Dur : 098 ms      QT Int : 368 ms       P-R-T Axes : 078 018 062 degrees     QTc Int : 440 ms    Sinus rhythm with frequent Premature ventricular complexes  Possible Lateral infarct ,age undetermined  Abnormal ECG  When compared with ECG of 22-JUN-2022 13:42,  Premature ventricular complexes are now Present  Borderline criteria for Lateral infarct are now Present  Confirmed by ALVINA CORBIN MD ([id number]) on 4/29/2024 8:08:47 PM    Referred By: [email or web address]   SELF           Confirmed By:ALVINA CORBIN MD     Results for orders placed during the hospital encounter of 07/01/22    Echo    Interpretation Summary  · The left ventricle is normal in size with mild concentric hypertrophy and normal systolic function.  · Moderate left atrial enlargement.  · The estimated ejection fraction is 60%.  · Normal left ventricular diastolic function.  · Normal right ventricular size with normal right ventricular systolic function.  · Mild mitral regurgitation.  · There is moderate aortic valve stenosis.  · Aortic valve area is 1.12 cm2; peak velocity is 2.82 m/s; mean gradient is 19 mmHg.  · Mild tricuspid regurgitation.  · Normal central venous pressure (3 mmHg).  · The estimated PA systolic pressure is 31 mmHg.      Physical Exam  General: Well nourished, Cooperative, Alert and Oriented    Airway:  Mallampati: II   Mouth Opening: Normal  TM Distance: Normal  Tongue: Normal  Neck ROM: Normal ROM    Dental:  Intact    Chest/Lungs:  Normal Respiratory Rate    Heart:  Rate: Normal  Rhythm: Regular Rhythm        Anesthesia Plan  Type of Anesthesia, risks & benefits discussed:    Anesthesia Type: MAC, Gen Natural Airway  Intra-op Monitoring Plan: Standard ASA Monitors  Induction:  IV  Informed Consent: Informed consent signed with the Patient and all parties understand the risks and agree with anesthesia  plan.  All questions answered.   ASA Score: 3  Day of Surgery Review of History & Physical: H&P Update referred to the surgeon/provider.    Ready For Surgery From Anesthesia Perspective.     .

## 2024-04-30 NOTE — CONSULTS
[facility])  Gastroenterology  Consult Note    Patient Name: Joyce Marino  MRN: [id number]  Admission Date: 4/29/2024  Hospital Length of Stay: 1 days  Code Status: Full Code   Attending Provider: Wero Sher,*   Consulting Provider: Bowen Strong PA-C  Primary Care Physician: Porter Vasques MD  Principal Problem:GI bleed    Inpatient consult to Gastroenterology  Consult performed by: Bowen Strong PA-C  Consult ordered by: Wero Sher MD  Reason for consult: GI bleed      Subjective:     HPI:  The patient presented to the ER for melena. She fell about 10 days ago and hit her head. She has been having headaches and taking Excedrin. She noticed black stool about three days ago. She has also been taking Pepton Bismol for nausea. However, Hgb in the ER was 7.1, MCV 88, , INR 0.9, BUN 45 and creatinine 0.7. She was transfused two units overnight and started on IV Protonix. Hgb this morning is 10.0. The patient had anemia in 2022. EGD and colonoscopy at that time were unremarkable. VCE was recommended but the patient cancelled.   In the ER, she had head and imaging which showed acute fractures of the orbit and 1 cm hemorrhage involving the right thalamus. Neurology was consulted and said the bleed was old.     Past Medical History:   Diagnosis Date    Arthritis     Asthma     Hyperlipidemia     Hypertension        Past Surgical History:   Procedure Laterality Date    COLONOSCOPY N/A 07/21/2022    Procedure: COLONOSCOPY;  Surgeon: Conrado Pierre MD;  Location: [facility];  Service: Endoscopy;  Laterality: N/A;    ESOPHAGOGASTRODUODENOSCOPY N/A 07/21/2022    Procedure: EGD (ESOPHAGOGASTRODUODENOSCOPY);  Surgeon: Conrado Pierre MD;  Location: [facility];  Service: Endoscopy;  Laterality: N/A;    HYSTERECTOMY  1992    for fibroids    OOPHORECTOMY      BSO at time of hysterectomy    michael in right leg         Review of patient's allergies indicates:    Allergen Reactions    Statins-hmg-coa reductase inhibitors Other (See Comments)     Pt reports muscle spasms when taking med     Family History       Problem Relation (Age of Onset)    Emphysema Mother    Heart disease Mother, Father    Lung cancer Maternal Grandfather          Tobacco Use    Smoking status: Never    Smokeless tobacco: Never   Substance and Sexual Activity    Alcohol use: No    Drug use: No    Sexual activity: Never     Partners: Male     Birth control/protection: None     Review of Systems   Constitutional:  Negative for fever.   HENT:  Negative for hearing loss.    Eyes:  Negative for visual disturbance.   Respiratory:  Negative for cough and shortness of breath.    Cardiovascular:  Negative for chest pain.   Gastrointestinal:         As per HPI.   Genitourinary:  Negative for dysuria, frequency and hematuria.   Musculoskeletal:  Positive for arthralgias. Negative for back pain.   Skin:  Negative for rash.   Neurological:  Positive for headaches. Negative for seizures, syncope and numbness.   Hematological:  Does not bruise/bleed easily.   Psychiatric/Behavioral:  The patient is not nervous/anxious.      Objective:     Vital Signs (Most Recent):  Temp: 97.8 °F (36.6 °C) (04/30/24 0713)  Pulse: 75 (04/30/24 0713)  Resp: 16 (04/30/24 0713)  BP: (!) 103/51 (04/30/24 0713)  SpO2: 98 % (04/30/24 0713) Vital Signs (24h Range):  Temp:  [97.8 °F (36.6 °C)-98.9 °F (37.2 °C)] 97.8 °F (36.6 °C)  Pulse:  [61-95] 75  Resp:  [16-20] 16  SpO2:  [95 %-100 %] 98 %  BP: ()/(51-68) 103/51     Weight: 61 kg (134 lb 7.7 oz) (04/29/24 0821)  Body mass index is 21.71 kg/m².      Intake/Output Summary (Last 24 hours) at 4/30/2024 0832  Last data filed at 4/29/2024 1844  Gross per 24 hour   Intake 600 ml   Output --   Net 600 ml       Lines/Drains/Airways       Peripheral Intravenous Line  Duration                  Peripheral IV - Single Lumen 04/29/24 0910 20 G Left Antecubital <1 day         Peripheral IV  - Single Lumen 04/29/24 1120 20 G Right Antecubital <1 day                     Physical Exam  Constitutional:       General: She is not in acute distress.     Appearance: Normal appearance.   HENT:      Head: Normocephalic and atraumatic.   Eyes:      Extraocular Movements: Extraocular movements intact.   Cardiovascular:      Rate and Rhythm: Normal rate and regular rhythm.      Heart sounds: No murmur heard.  Pulmonary:      Effort: Pulmonary effort is normal. No respiratory distress.      Breath sounds: Normal breath sounds. No wheezing.   Abdominal:      General: Bowel sounds are normal. There is no distension.      Palpations: Abdomen is soft. There is no mass.      Tenderness: There is no abdominal tenderness.   Musculoskeletal:      Cervical back: Normal range of motion and neck supple.      Right lower leg: No edema.      Left lower leg: No edema.   Skin:     General: Skin is warm and dry.      Findings: No rash.   Neurological:      Mental Status: She is alert and oriented to person, place, and time.      Cranial Nerves: No cranial nerve deficit.   Psychiatric:         Behavior: Behavior normal.          Significant Labs:  CBC:   Recent Labs   Lab 04/29/24  0910 04/30/24  0522   WBC 8.07 8.05   HGB 7.1* 10.0*   HCT 21.5* 29.6*    252     CMP:   Recent Labs   Lab 04/29/24  0910 04/30/24  0522   * 93   CALCIUM 10.4 9.8   ALBUMIN 3.6  --    PROT 6.2  --     141   K 3.5 3.9   CO2 22* 25    109   BUN 45* 25*   CREATININE 0.7 0.6   ALKPHOS 84  --    ALT 11  --    AST 13  --    BILITOT 1.0  --      Coagulation:   Recent Labs   Lab 04/29/24  0910   INR 0.9   APTT 23.5       Significant Imaging:  Imaging results within the past 24 hours have been reviewed.  Assessment/Plan:     Oncology  Normocytic anemia  At risk for UGI bleed with NSAID use.   She reported black stool but has taken Pepto.  EGD and colonoscopy in 2022 unremarkable.   Will plan on repeat EGD today.   Keep NPO.   Continue  Protonix.   Recommend additional anemia workup as outpatient with Hematology.     Thank you for your consult. I will follow-up with patient. Please contact us if you have any additional questions.    Bowen Strong PA-C  Gastroenterology  [facility] Telemetry ([facility])

## 2024-04-30 NOTE — ANESTHESIA POSTPROCEDURE EVALUATION
Anesthesia Post Evaluation    Patient: Joyce Marino    Procedure(s) Performed: Procedure(s) (LRB):  EGD (ESOPHAGOGASTRODUODENOSCOPY) (N/A)    Final Anesthesia Type: MAC      Patient location during evaluation: GI PACU  Patient participation: Yes- Able to Participate  Level of consciousness: awake and alert  Post-procedure vital signs: reviewed and stable  Pain management: adequate  Airway patency: patent    PONV status at discharge: No PONV  Anesthetic complications: no      Cardiovascular status: blood pressure returned to baseline and hemodynamically stable  Respiratory status: unassisted, spontaneous ventilation and room air  Hydration status: euvolemic  Follow-up not needed.              Vitals Value Taken Time   /60 04/30/24 1142   Temp 36.3 °C (97.3 °F) 04/30/24 1142   Pulse 69 04/30/24 1142   Resp 14 04/30/24 1142   SpO2 97 % 04/30/24 1142         Event Time   Out of Recovery 04/30/2024 11:37:22         Pain/Cass Score: Cass Score: 10 (4/30/2024 11:15 AM)

## 2024-04-30 NOTE — TRANSFER OF CARE
"Anesthesia Transfer of Care Note    Patient: Joyce Marino    Procedure(s) Performed: Procedure(s) (LRB):  EGD (ESOPHAGOGASTRODUODENOSCOPY) (N/A)    Patient location: GI    Anesthesia Type: MAC    Transport from OR: Transported from OR on room air with adequate spontaneous ventilation    Post pain: adequate analgesia    Post assessment: no apparent anesthetic complications    Post vital signs: stable    Level of consciousness: sedated and responds to stimulation    Nausea/Vomiting: no nausea/vomiting    Complications: none    Transfer of care protocol was followed      Last vitals: Visit Vitals  BP (!) 117/57 (BP Location: Left arm, Patient Position: Lying)   Pulse 78   Temp 37.2 °C (99 °F) (Temporal)   Resp 16   Ht 5' 6" (1.676 m)   Wt 60.3 kg (133 lb)   SpO2 98%   Breastfeeding No   BMI 21.47 kg/m²     "

## 2024-04-30 NOTE — PLAN OF CARE
Updated patient on plan of care. Pt tolerating CLD. Neuro checks completed Q4H, GCS 15, PERRLA, following commands, gait unchanged. IVF infusing. Instructed patient to use call light for assistance, call light in reach. Hourly rounding performed. Vitals q4 hours. Education provided, questions answered/encouraged. Chart check complete. NSR    Problem: Adult Inpatient Plan of Care  Goal: Plan of Care Review  Outcome: Progressing

## 2024-04-30 NOTE — HPI
The patient presented to the ER for melena. She fell about 10 days ago and hit her head. She has been having headaches and taking Excedrin. She noticed black stool about three days ago. She has also been taking Pepton Bismol for nausea. However, Hgb in the ER was 7.1, MCV 88, , INR 0.9, BUN 45 and creatinine 0.7. She was transfused two units overnight and started on IV Protonix. Hgb this morning is 10.0. The patient had anemia in 2022. EGD and colonoscopy at that time were unremarkable. VCE was recommended but the patient cancelled.   In the ER, she had head and imaging which showed acute fractures of the orbit and 1 cm hemorrhage involving the right thalamus. Neurology was consulted and said the bleed was old.

## 2024-04-30 NOTE — PROGRESS NOTES
[facility]  Progress Note    Patient Name: Joyec Marino  MRN: [id number]  Patient Class: IP- Inpatient   Admission Date: 4/29/2024  Length of Stay: 1 days  Attending Physician: Wero Sher,*  Primary Care Provider: Porter Vasques MD        Subjective:     Principal Problem:GI bleed        HPI:  78-year-old female with past medical history of hypertension, hyperlipidemia, mild aortic stenosis, GI bleed in 2022 presented to ED due to complaints of dizziness over past 3-4 weeks and dark stools over past 2-3 days.  Status post episode of fall approximately 10 days ago, denied prior syncopal episode, expressed dizziness prior to the episode.  Denied headache, nausea, vomiting, hematemesis.  Expressed taking Excedrin following fall.  Past history of GI bleed in 2022, status post EGD, colonoscopy with no acute findings.  Currently not on any blood thinners.    At baseline, ambulates without assistance, lives alone, gets support from sister.    In ED, CT head showed Acute 1 cm hemorrhage involving the right thalamus. No significant mass effect or midline shift.  ED provider discussed about CT head findings with on-call neurosurgeon Dr. 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  No evidence of intraventricular hemorrhage or hydrocephalus; stated okay keep patient in [place], no acute needs for transfer to [facility] at this point, however recommended close neuro follow up, if any acute neurological changes, then recommended stat imaging, transfer to [facility];   On examination, no neurological deficits noted, strength, sensation intact, denied bowel or bladder incontinence.    For concerns for GI bleed, ordered 2 units of PRBC, GI evaluated and recommended clear liquid diet for today, NPO since midnight for egd in am, PPI 40 mg b.i.d. IV.    Code status- full code            Overview/Hospital Course:  78-year-old female presented to ED due to complaints of dizziness over past 3-4 weeks and dark stools over past 2-3 days.  Status post episode of fall approximately 10 days ago;  Past history of GI bleed in 2022, status post EGD, colonoscopy with no acute findings.  Currently not on any blood thinners.    At baseline, ambulates without assistance, lives alone, gets support from sister.    In ED, CT head showed Acute 1 cm hemorrhage involving the right thalamus. No significant mass effect or midline shift.  ED provider discussed about CT head findings with on-call neurosurgeon Dr. 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  No evidence of intraventricular hemorrhage or hydrocephalus; stated okay keep patient in [place], no acute needs for transfer to [facility] at this point, however recommended close neuro follow up, if any acute neurological changes, then recommended stat imaging, transfer to [facility];   On examination, no neurological deficits noted, strength, sensation intact, denied bowel or bladder incontinence.    For concerns for GI bleed, ordered 2 units of PRBC on 04/29/2024, scheduled for EGD on 04/30/2024.      Interval History:     No acute events overnight  Denied headache, dizziness, neurological deficits, weakness/sensation loss in upper or lower extremities, denied bowel or bladder incontinence,;  Ambulating without issues   Scheduled for EGD today       Review of Systems    Constitutional:  Negative for fever.   HENT:  Negative for sore throat.    Respiratory:  Negative for shortness of breath.    Cardiovascular:  Negative for chest pain.   Gastrointestinal:  Negative for abdominal pain, constipation, nausea and vomiting.   Genitourinary:  Negative for dysuria.   Musculoskeletal:  Negative for back pain.   Skin:  Negative for rash.   Neurological:  Negative for seizures, speech  difficulty, weakness and numbness.   Hematological:  Does not bruise/bleed easily.   Psychiatric/Behavioral:  Negative for confusion.   Objective:     Vital Signs (Most Recent):  Temp: 97.3 °F (36.3 °C) (04/30/24 1142)  Pulse: 69 (04/30/24 1142)  Resp: 14 (04/30/24 1142)  BP: 126/60 (04/30/24 1142)  SpO2: 97 % (04/30/24 1142) Vital Signs (24h Range):  Temp:  [97 °F (36.1 °C)-99 °F (37.2 °C)] 97.3 °F (36.3 °C)  Pulse:  [61-95] 69  Resp:  [14-20] 14  SpO2:  [95 %-100 %] 97 %  BP: ()/(51-69) 126/60     Weight: 60.3 kg (133 lb)  Body mass index is 21.47 kg/m².    Intake/Output Summary (Last 24 hours) at 4/30/2024 1152  Last data filed at 4/30/2024 1116  Gross per 24 hour   Intake 850 ml   Output --   Net 850 ml         Physical Exam      Constitutional: She appears well-developed and well-nourished.   HENT:   Patient with bruising to the left side of her face.  She is some mild tenderness to the left orbital region.  Extraocular movements are fully intact.   Eyes: Conjunctivae and EOM are normal.   No entrapment   Neck: Neck supple.   Normal range of motion.  Cardiovascular:  Normal rate, regular rhythm, normal heart sounds and intact distal pulses.           Pulmonary/Chest: Breath sounds normal. No respiratory distress. She has no wheezes. She has no rhonchi. She has no rales.   Abdominal: Abdomen is soft. Bowel sounds are normal.   Musculoskeletal:         General: Normal range of motion.      Cervical back: Normal range of motion and neck supple.      Comments: No weakness of her arms or legs.      Neurological: She is alert and oriented to person, place, and time. She has normal strength.  Sensation intact, no bowel or bladder incontinence noted.    Skin: Skin is warm and dry.   Psychiatric: She has a normal mood and affect. Thought content normal.       Significant Labs: All pertinent labs within the past 24 hours have been reviewed.  CBC:   Recent Labs   Lab 04/29/24  0910 04/30/24  0522   WBC 8.07 8.05    HGB 7.1* 10.0*   HCT 21.5* 29.6*    252     CMP:   Recent Labs   Lab 04/29/24  0910 04/30/24  0522    141   K 3.5 3.9    109   CO2 22* 25   * 93   BUN 45* 25*   CREATININE 0.7 0.6   CALCIUM 10.4 9.8   PROT 6.2  --    ALBUMIN 3.6  --    BILITOT 1.0  --    ALKPHOS 84  --    AST 13  --    ALT 11  --    ANIONGAP 8 7*       Significant Imaging:     Imaging Results               CT Head Without Contrast (Final result)  Result time 04/29/24 11:37:43      Final result by Birgit Nash MD (Timothy) (04/29/24 11:37:43)                   Impression:      Acute 1 cm hemorrhage involving the right thalamus.  No significant mass effect or midline shift.    This report was flagged in Epic as abnormal.    All CT scans at this facility use dose modulation, iterative reconstructions, and/or weight base dosing when appropriate to reduce radiation dose to as low as reasonably achievable.      Electronically signed by: Birgit Nash MD  Date:    04/29/2024  Time:    11:37               Narrative:    EXAMINATION:  CT HEAD WITHOUT CONTRAST    CLINICAL HISTORY:  Head trauma, minor (Age >= 65y);    TECHNIQUE:  Noncontrast images were obtained.    COMPARISON:  None    FINDINGS:  There is a focal intra-axial hemorrhage involving the posterior aspect of the right thalamus measuring proximally 1 cm.  No midline shift.  Ventricles are enlarged likely secondary to central atrophy.  The skull is intact.                                       CT Maxillofacial Without Contrast (Final result)  Result time 04/29/24 11:32:26      Final result by Birgit Nash MD (Timothy) (04/29/24 11:32:26)                   Impression:      Acute fracture involving the lateral wall left orbit with associated fractures extending through the anterolateral wall the left maxillary sinus and also involving the left infraorbital orbital rim and infraorbital foramen.    All CT scans at this facility use dose modulation, iterative  reconstructions, and/or weight base dosing when appropriate to reduce radiation dose to as low as reasonably achievable.      Electronically signed by: Birgit Nash MD  Date:    04/29/2024  Time:    11:32               Narrative:    EXAMINATION:  CT MAXILLOFACIAL WITHOUT CONTRAST    CLINICAL HISTORY:  Facial trauma, blunt;    TECHNIQUE:  Standard axial and coronal facial bone CT performed.    COMPARISON:  None    FINDINGS:  There is acute fracture involving the lateral wall the left orbit.  Associated fractures are seen extending through the anterior wall and lateral walls of the left maxillary sinus.  Fracture also extends through the left infraorbital rim and appears involve the infraorbital foramen.  There is no hemorrhage seen within the maxillary sinus.  No intraorbital hematoma.  The left zygomatic arch is intact.  The pterygoid plates are intact.    The right facial bones are intact.  The nasal bones are intact.    The mandible is intact.                                       Assessment/Plan:      * GI bleed  Likely secondary to Excedrin use   History of GI bleed in 2022, EGD, colonoscopy at the time did not show acute findings   Patient has been recommended to undergo a video capsule endoscopy in 2022, however did not undergo;   For current concerns for GI bleed, ordered 2 units of PRBC, GI evaluated and recommended clear liquid diet for today, NPO since midnight for egd in am, PPI 40 mg b.i.d. IV.      Subacute intracranial hemorrhage  CT head showed Acute 1 cm hemorrhage involving the right thalamus. No significant mass effect or midline shift.  ED provider discussed about CT head findings with on-call neurosurgeon Dr. 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  No evidence of intraventricular hemorrhage or hydrocephalus; stated okay keep patient in [place], no acute needs for transfer to [facility] at this point,  however recommended close neuro follow up, if any acute neurological changes, then recommended stat imaging, transfer to [facility] [place];   On examination, no neurological deficits noted, strength, sensation intact, denied bowel or bladder incontinence.    Q.4 neurochecks, fall precautions; hold blood thinners   Target blood pressures less than 140/80  Neurosurgery follow up        HTN (hypertension)  Latest blood pressure and vitals reviewed-     Temp:  [97.9 °F (36.6 °C)-98.9 °F (37.2 °C)]   Pulse:  [73-95]   Resp:  [18-20]   BP: ()/(45-68)   SpO2:  [97 %-100 %] .   Home meds for hypertension were reviewed and noted below.   Hypertension Medications               hydroCHLOROthiazide (HYDRODIURIL) 12.5 MG Tab Take 1 tablet (12.5 mg total) by mouth once daily.    losartan (COZAAR) 50 MG tablet Take 1 tablet (50 mg total) by mouth once daily.          Given dizziness, we will hold hydrochlorothiazide, continue losartan, added amlodipine   Given concerns for possible subacute hemorrhage, we will target blood pressure less than 140/80   Hydralazine p.r.n.         VTE Risk Mitigation (From admission, onward)           Ordered     IP VTE HIGH RISK PATIENT  Once         04/29/24 1511     Place sequential compression device  Until discontinued         04/29/24 1511                    Discharge Planning   REYNALDO:      Code Status: Full Code   Is the patient medically ready for discharge?:     Reason for patient still in hospital (select all that apply): Patient trending condition, Consult recommendations, and Pending disposition                     Wero Sher MD  Department of Hospital Medicine   [facility] - Telemetry ([facility])

## 2024-04-30 NOTE — PROVATION PATIENT INSTRUCTIONS
Discharge Summary/Instructions after an Endoscopic Procedure  Patient Name: Joyce Marino  Patient MRN: [id number]  Patient YOB: 1945 Tuesday, April 30, 2024 Christal Hernández MD  Dear patient,  As a result of recent federal legislation (The Federal Cures Act), you may   receive lab or pathology results from your procedure in your [facility]   account before your physician is able to contact you. Your physician or   their representative will relay the results to you with their   recommendations at their soonest availability.  Thank you,  RESTRICTIONS:  During your procedure today, you received medications for sedation.  These   medications may affect your judgment, balance and coordination.  Therefore,   for 24 hours, you have the following restrictions:   - DO NOT drive a car, operate machinery, make legal/financial decisions,   sign important papers or drink alcohol.    ACTIVITY:  Today: no heavy lifting, straining or running due to procedural   sedation/anesthesia.  The following day: return to full activity including work.  DIET:  Eat and drink normally unless instructed otherwise.     TREATMENT FOR COMMON SIDE EFFECTS:  - Mild abdominal pain, nausea, belching, bloating or excessive gas:  rest,   eat lightly and use a heating pad.  - Sore Throat: treat with throat lozenges and/or gargle with warm salt   water.  - Because air was used during the procedure, expelling large amounts of air   from your rectum or belching is normal.  - If a bowel prep was taken, you may not have a bowel movement for 1-3 days.    This is normal.  SYMPTOMS TO WATCH FOR AND REPORT TO YOUR PHYSICIAN:  1. Abdominal pain or bloating, other than gas cramps.  2. Chest pain.  3. Back pain.  4. Signs of infection such as: chills or fever occurring within 24 hours   after the procedure.  5. Rectal bleeding, which would show as bright red, maroon, or black stools.   (A tablespoon of blood from the rectum is not serious, especially  if   hemorrhoids are present.)  6. Vomiting.  7. Weakness or dizziness.  GO DIRECTLY TO THE NEAREST EMERGENCY ROOM IF YOU HAVE ANY OF THE FOLLOWING:      Difficulty breathing              Chills and/or fever over 101 F   Persistent vomiting and/or vomiting blood   Severe abdominal pain   Severe chest pain   Black, tarry stools   Bleeding- more than one tablespoon   Any other symptom or condition that you feel may need urgent attention  Your doctor recommends these additional instructions:  If any biopsies were taken, your doctors clinic will contact you in 1 to 2   weeks with any results.  - [facility].   - Clear liquid diet today.   - PPI IV daily for a total of 72 hours.   - Use Prilosec (omeprazole) 40 mg PO daily for 3 months.   - Use sucralfate suspension 1 gram PO QID for 10 days.   - Repeat upper endoscopy in 3 months to check healing.   - Return to GI office.   -Avoid NSAIDs.  For questions, problems or results please call your physician Christal Hernández MD at Work:  [phone]  If you have any questions about the above instructions, call the GI   department at [phone] or call the endoscopy unit at [phone]   from 7am until 3 pm.  [facility], EMERGENCY ROOM PHONE NUMBER:   [phone]  IF A COMPLICATION OR EMERGENCY SITUATION ARISES AND YOU ARE UNABLE TO REACH   YOUR PHYSICIAN - GO DIRECTLY TO THE EMERGENCY ROOM.  I have read or have had read to me these discharge instructions for my   procedure and have received a written copy.  I understand these   instructions and will follow-up with my physician if I have any questions.     __________________________________       _____________________________________  Nurse Signature                                          Patient/Designated   Responsible Party Signature  Christal Hernández MD  4/30/2024 11:08:36 AM  This report has been verified and signed electronically.  Dear  patient,  As a result of recent federal legislation (The Federal Cures Act), you may   receive lab or pathology results from your procedure in your [facility]   account before your physician is able to contact you. Your physician or   their representative will relay the results to you with their   recommendations at their soonest availability.  Thank you,  PROVATION

## 2024-04-30 NOTE — HOSPITAL COURSE
78-year-old female presented to ED due to complaints of dizziness over past 3-4 weeks and dark stools over past 2-3 days.  Status post episode of fall approximately 10 days ago;  Past history of GI bleed in 2022, status post EGD, colonoscopy with no acute findings.  Currently not on any blood thinners.    At baseline, ambulates without assistance, lives alone, gets support from sister.    In ED, CT head showed Acute 1 cm hemorrhage involving the right thalamus. No significant mass effect or midline shift.  ED provider discussed about CT head findings with on-call neurosurgeon Dr. 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  No evidence of intraventricular hemorrhage or hydrocephalus; stated okay keep patient in [place], no acute needs for transfer to [facility] at this point, however recommended close neuro follow up, if any acute neurological changes, then recommended stat imaging, transfer to [facility];   On examination, no neurological deficits noted, strength, sensation intact, denied bowel or bladder incontinence.    For concerns for GI bleed, ordered 2 units of PRBC on 04/29/2024,;   s/p EGD on 04/30/2024 --  Normal duodenal bulb and second portion of the duodenum.  Erosive gastropathy with no bleeding and stigmata of recent bleeding. Biopsied. Non-bleeding gastric ulcer with a nonbleeding ; visible vessel (Rosendo Class IIa). Injected. Treated with bipolar cautery. Z-line regular, 40 cm from the incisors. Widely patent Schatzki ring.   Recommendation:        -  PPI IV daily for a total of 72 hours.  Use Prilosec (omeprazole) 40 mg PO daily for 3 months. Use sucralfate suspension 1 gram PO QID for 10 days. Repeat upper endoscopy in 3 months to check healing. Return to GI office. Avoid NSAIDs.     On 5/2/24, examination of patient denied bedside, appeared alert and oriented x3, denied acute issues overnight.    Denied headache,  dizziness, chest pain, shortness OO breath, nausea, vomiting, bowel or bladder incontinence, upper or lower extremity weakness.    Noted to have an episode of dark stool last night, hemoglobin trended up, received 72 hours of PPI IV per GI recommendations.  Discussed with Gastroenterology, stated okay for discharge from GI standpoint, recommended omeprazole 40 mg p.o. daily for 3 months, sucralfate 1 g p.o. q.i.d. for 10 days, repeat upper endoscopy in 3 months to check healing.  To avoid NSAIDs.    Also discussed with Neurosurgery regarding any need for repeat imaging, Dr. Miller stated that given no neurological deficits at this point, patient able to ambulate without issues, no need for repeat imaging at this point, recommended outpatient follow up.    Considering clinical and hemodynamic stability, planning to discharge patient today, emphasized on compliance with medications, follow up with PCP/GI/neurosurgery upon discharge, patient expressed understanding, agreed with the plan   Medications to be delivered bedside

## 2024-04-30 NOTE — SUBJECTIVE & OBJECTIVE
Past Medical History:   Diagnosis Date    Arthritis     Asthma     Hyperlipidemia     Hypertension        Past Surgical History:   Procedure Laterality Date    COLONOSCOPY N/A 07/21/2022    Procedure: COLONOSCOPY;  Surgeon: Conrado Pierre MD;  Location: [facility];  Service: Endoscopy;  Laterality: N/A;    ESOPHAGOGASTRODUODENOSCOPY N/A 07/21/2022    Procedure: EGD (ESOPHAGOGASTRODUODENOSCOPY);  Surgeon: Conrado Pierre MD;  Location: [facility];  Service: Endoscopy;  Laterality: N/A;    HYSTERECTOMY  1992    for fibroids    OOPHORECTOMY      BSO at time of hysterectomy    michael in right leg         Review of patient's allergies indicates:   Allergen Reactions    Statins-hmg-coa reductase inhibitors Other (See Comments)     Pt reports muscle spasms when taking med     Family History       Problem Relation (Age of Onset)    Emphysema Mother    Heart disease Mother, Father    Lung cancer Maternal Grandfather          Tobacco Use    Smoking status: Never    Smokeless tobacco: Never   Substance and Sexual Activity    Alcohol use: No    Drug use: No    Sexual activity: Never     Partners: Male     Birth control/protection: None     Review of Systems   Constitutional:  Negative for fever.   HENT:  Negative for hearing loss.    Eyes:  Negative for visual disturbance.   Respiratory:  Negative for cough and shortness of breath.    Cardiovascular:  Negative for chest pain.   Gastrointestinal:         As per HPI.   Genitourinary:  Negative for dysuria, frequency and hematuria.   Musculoskeletal:  Positive for arthralgias. Negative for back pain.   Skin:  Negative for rash.   Neurological:  Positive for headaches. Negative for seizures, syncope and numbness.   Hematological:  Does not bruise/bleed easily.   Psychiatric/Behavioral:  The patient is not nervous/anxious.      Objective:     Vital Signs (Most Recent):  Temp: 97.8 °F (36.6 °C) (04/30/24 0713)  Pulse: 75 (04/30/24 0713)  Resp: 16 (04/30/24 0713)  BP: (!)  103/51 (04/30/24 0713)  SpO2: 98 % (04/30/24 0713) Vital Signs (24h Range):  Temp:  [97.8 °F (36.6 °C)-98.9 °F (37.2 °C)] 97.8 °F (36.6 °C)  Pulse:  [61-95] 75  Resp:  [16-20] 16  SpO2:  [95 %-100 %] 98 %  BP: ()/(51-68) 103/51     Weight: 61 kg (134 lb 7.7 oz) (04/29/24 0821)  Body mass index is 21.71 kg/m².      Intake/Output Summary (Last 24 hours) at 4/30/2024 0832  Last data filed at 4/29/2024 1844  Gross per 24 hour   Intake 600 ml   Output --   Net 600 ml       Lines/Drains/Airways       Peripheral Intravenous Line  Duration                  Peripheral IV - Single Lumen 04/29/24 0910 20 G Left Antecubital <1 day         Peripheral IV - Single Lumen 04/29/24 1120 20 G Right Antecubital <1 day                     Physical Exam  Constitutional:       General: She is not in acute distress.     Appearance: Normal appearance.   HENT:      Head: Normocephalic and atraumatic.   Eyes:      Extraocular Movements: Extraocular movements intact.   Cardiovascular:      Rate and Rhythm: Normal rate and regular rhythm.      Heart sounds: No murmur heard.  Pulmonary:      Effort: Pulmonary effort is normal. No respiratory distress.      Breath sounds: Normal breath sounds. No wheezing.   Abdominal:      General: Bowel sounds are normal. There is no distension.      Palpations: Abdomen is soft. There is no mass.      Tenderness: There is no abdominal tenderness.   Musculoskeletal:      Cervical back: Normal range of motion and neck supple.      Right lower leg: No edema.      Left lower leg: No edema.   Skin:     General: Skin is warm and dry.      Findings: No rash.   Neurological:      Mental Status: She is alert and oriented to person, place, and time.      Cranial Nerves: No cranial nerve deficit.   Psychiatric:         Behavior: Behavior normal.          Significant Labs:  CBC:   Recent Labs   Lab 04/29/24  0910 04/30/24  0522   WBC 8.07 8.05   HGB 7.1* 10.0*   HCT 21.5* 29.6*    252     CMP:   Recent Labs    Lab 04/29/24  0910 04/30/24  0522   * 93   CALCIUM 10.4 9.8   ALBUMIN 3.6  --    PROT 6.2  --     141   K 3.5 3.9   CO2 22* 25    109   BUN 45* 25*   CREATININE 0.7 0.6   ALKPHOS 84  --    ALT 11  --    AST 13  --    BILITOT 1.0  --      Coagulation:   Recent Labs   Lab 04/29/24  0910   INR 0.9   APTT 23.5       Significant Imaging:  Imaging results within the past 24 hours have been reviewed.

## 2024-04-30 NOTE — SUBJECTIVE & OBJECTIVE
Interval History:     No acute events overnight  Denied headache, dizziness, neurological deficits, weakness/sensation loss in upper or lower extremities, denied bowel or bladder incontinence,;  Ambulating without issues   Scheduled for EGD today       Review of Systems    Constitutional:  Negative for fever.   HENT:  Negative for sore throat.    Respiratory:  Negative for shortness of breath.    Cardiovascular:  Negative for chest pain.   Gastrointestinal:  Negative for abdominal pain, constipation, nausea and vomiting.   Genitourinary:  Negative for dysuria.   Musculoskeletal:  Negative for back pain.   Skin:  Negative for rash.   Neurological:  Negative for seizures, speech difficulty, weakness and numbness.   Hematological:  Does not bruise/bleed easily.   Psychiatric/Behavioral:  Negative for confusion.   Objective:     Vital Signs (Most Recent):  Temp: 97.3 °F (36.3 °C) (04/30/24 1142)  Pulse: 69 (04/30/24 1142)  Resp: 14 (04/30/24 1142)  BP: 126/60 (04/30/24 1142)  SpO2: 97 % (04/30/24 1142) Vital Signs (24h Range):  Temp:  [97 °F (36.1 °C)-99 °F (37.2 °C)] 97.3 °F (36.3 °C)  Pulse:  [61-95] 69  Resp:  [14-20] 14  SpO2:  [95 %-100 %] 97 %  BP: ()/(51-69) 126/60     Weight: 60.3 kg (133 lb)  Body mass index is 21.47 kg/m².    Intake/Output Summary (Last 24 hours) at 4/30/2024 1152  Last data filed at 4/30/2024 1116  Gross per 24 hour   Intake 850 ml   Output --   Net 850 ml         Physical Exam      Constitutional: She appears well-developed and well-nourished.   HENT:   Patient with bruising to the left side of her face.  She is some mild tenderness to the left orbital region.  Extraocular movements are fully intact.   Eyes: Conjunctivae and EOM are normal.   No entrapment   Neck: Neck supple.   Normal range of motion.  Cardiovascular:  Normal rate, regular rhythm, normal heart sounds and intact distal pulses.           Pulmonary/Chest: Breath sounds normal. No respiratory distress. She has no wheezes.  She has no rhonchi. She has no rales.   Abdominal: Abdomen is soft. Bowel sounds are normal.   Musculoskeletal:         General: Normal range of motion.      Cervical back: Normal range of motion and neck supple.      Comments: No weakness of her arms or legs.      Neurological: She is alert and oriented to person, place, and time. She has normal strength.  Sensation intact, no bowel or bladder incontinence noted.    Skin: Skin is warm and dry.   Psychiatric: She has a normal mood and affect. Thought content normal.       Significant Labs: All pertinent labs within the past 24 hours have been reviewed.  CBC:   Recent Labs   Lab 04/29/24  0910 04/30/24  0522   WBC 8.07 8.05   HGB 7.1* 10.0*   HCT 21.5* 29.6*    252     CMP:   Recent Labs   Lab 04/29/24  0910 04/30/24  0522    141   K 3.5 3.9    109   CO2 22* 25   * 93   BUN 45* 25*   CREATININE 0.7 0.6   CALCIUM 10.4 9.8   PROT 6.2  --    ALBUMIN 3.6  --    BILITOT 1.0  --    ALKPHOS 84  --    AST 13  --    ALT 11  --    ANIONGAP 8 7*       Significant Imaging:     Imaging Results               CT Head Without Contrast (Final result)  Result time 04/29/24 11:37:43      Final result by Birgit Nash ([facility]), MD (04/29/24 11:37:43)                   Impression:      Acute 1 cm hemorrhage involving the right thalamus.  No significant mass effect or midline shift.    This report was flagged in Epic as abnormal.    All CT scans at this facility use dose modulation, iterative reconstructions, and/or weight base dosing when appropriate to reduce radiation dose to as low as reasonably achievable.      Electronically signed by: Birgit Nash MD  Date:    04/29/2024  Time:    11:37               Narrative:    EXAMINATION:  CT HEAD WITHOUT CONTRAST    CLINICAL HISTORY:  Head trauma, minor (Age >= 65y);    TECHNIQUE:  Noncontrast images were obtained.    COMPARISON:  None    FINDINGS:  There is a focal intra-axial hemorrhage involving the  posterior aspect of the right thalamus measuring proximally 1 cm.  No midline shift.  Ventricles are enlarged likely secondary to central atrophy.  The skull is intact.                                       CT Maxillofacial Without Contrast (Final result)  Result time 04/29/24 11:32:26      Final result by Birgit NashReji), MD (04/29/24 11:32:26)                   Impression:      Acute fracture involving the lateral wall left orbit with associated fractures extending through the anterolateral wall the left maxillary sinus and also involving the left infraorbital orbital rim and infraorbital foramen.    All CT scans at this facility use dose modulation, iterative reconstructions, and/or weight base dosing when appropriate to reduce radiation dose to as low as reasonably achievable.      Electronically signed by: Birgit Nash MD  Date:    04/29/2024  Time:    11:32               Narrative:    EXAMINATION:  CT MAXILLOFACIAL WITHOUT CONTRAST    CLINICAL HISTORY:  Facial trauma, blunt;    TECHNIQUE:  Standard axial and coronal facial bone CT performed.    COMPARISON:  None    FINDINGS:  There is acute fracture involving the lateral wall the left orbit.  Associated fractures are seen extending through the anterior wall and lateral walls of the left maxillary sinus.  Fracture also extends through the left infraorbital rim and appears involve the infraorbital foramen.  There is no hemorrhage seen within the maxillary sinus.  No intraorbital hematoma.  The left zygomatic arch is intact.  The pterygoid plates are intact.    The right facial bones are intact.  The nasal bones are intact.    The mandible is intact.

## 2024-04-30 NOTE — PLAN OF CARE
A207/A207 OPAL Marino is a 78 y.o.female admitted on 4/29/2024 for GI bleed   Code Status: Full Code MRN: [id number]   Review of patient's allergies indicates:   Allergen Reactions    Statins-hmg-coa reductase inhibitors Other (See Comments)     Pt reports muscle spasms when taking med     Past Medical History:   Diagnosis Date    Arthritis     Asthma     Hyperlipidemia     Hypertension       PRN meds    0.9%  NaCl infusion (for blood administration), , Q24H PRN  acetaminophen, 500 mg, Q6H PRN  hydrALAZINE, 10 mg, Q6H PRN  ondansetron, 4 mg, Q6H PRN  sodium chloride 0.9%, 10 mL, PRN      Chart check completed. Will continue plan of care.      Orientation: oriented x 4  Tate Coma Scale Score: 15     Lead Monitored: Lead II Rhythm: conduction defect, normal sinus rhythm Frequency/Ectopy: PVCs  Cardiac/Telemetry Box Number: [id number]    Last Bowel Movement: 04/29/24  Diet NPO     Martir Score: 20  Fall Risk Score: 16  Accucheck []   Freq?      Lines/Drains/Airways       Peripheral Intravenous Line  Duration                  Peripheral IV - Single Lumen 04/29/24 0910 20 G Left Antecubital <1 day         Peripheral IV - Single Lumen 04/29/24 1120 20 G Right Antecubital <1 day

## 2024-05-01 DIAGNOSIS — Z86.2 HISTORY OF IRON DEFICIENCY ANEMIA: Primary | ICD-10-CM

## 2024-05-01 DIAGNOSIS — K25.4 GASTRIC ULCER WITH HEMORRHAGE, UNSPECIFIED CHRONICITY: ICD-10-CM

## 2024-05-01 LAB
ANION GAP SERPL CALC-SCNC: 8 MMOL/L (ref 8–16)
BASOPHILS # BLD AUTO: 0.06 K/UL (ref 0–0.2)
BASOPHILS NFR BLD: 0.9 % (ref 0–1.9)
BUN SERPL-MCNC: 15 MG/DL (ref 8–23)
CALCIUM SERPL-MCNC: 9.7 MG/DL (ref 8.7–10.5)
CHLORIDE SERPL-SCNC: 109 MMOL/L (ref 95–110)
CO2 SERPL-SCNC: 24 MMOL/L (ref 23–29)
CREAT SERPL-MCNC: 0.6 MG/DL (ref 0.5–1.4)
DIFFERENTIAL METHOD BLD: ABNORMAL
EOSINOPHIL # BLD AUTO: 0.4 K/UL (ref 0–0.5)
EOSINOPHIL NFR BLD: 5.5 % (ref 0–8)
ERYTHROCYTE [DISTWIDTH] IN BLOOD BY AUTOMATED COUNT: 13.6 % (ref 11.5–14.5)
EST. GFR  (NO RACE VARIABLE): >60 ML/MIN/1.73 M^2
GLUCOSE SERPL-MCNC: 87 MG/DL (ref 70–110)
HCT VFR BLD AUTO: 27.3 % (ref 37–48.5)
HGB BLD-MCNC: 9.4 G/DL (ref 12–16)
IMM GRANULOCYTES # BLD AUTO: 0.02 K/UL (ref 0–0.04)
IMM GRANULOCYTES NFR BLD AUTO: 0.3 % (ref 0–0.5)
LYMPHOCYTES # BLD AUTO: 1.5 K/UL (ref 1–4.8)
LYMPHOCYTES NFR BLD: 22.3 % (ref 18–48)
MCH RBC QN AUTO: 29.4 PG (ref 27–31)
MCHC RBC AUTO-ENTMCNC: 34.4 G/DL (ref 32–36)
MCV RBC AUTO: 85 FL (ref 82–98)
MONOCYTES # BLD AUTO: 0.7 K/UL (ref 0.3–1)
MONOCYTES NFR BLD: 9.9 % (ref 4–15)
NEUTROPHILS # BLD AUTO: 4 K/UL (ref 1.8–7.7)
NEUTROPHILS NFR BLD: 61.1 % (ref 38–73)
NRBC BLD-RTO: 0 /100 WBC
PLATELET # BLD AUTO: 242 K/UL (ref 150–450)
PMV BLD AUTO: 10.2 FL (ref 9.2–12.9)
POTASSIUM SERPL-SCNC: 3.8 MMOL/L (ref 3.5–5.1)
RBC # BLD AUTO: 3.2 M/UL (ref 4–5.4)
SODIUM SERPL-SCNC: 141 MMOL/L (ref 136–145)
WBC # BLD AUTO: 6.58 K/UL (ref 3.9–12.7)

## 2024-05-01 PROCEDURE — 25000003 PHARM REV CODE 250: Performed by: STUDENT IN AN ORGANIZED HEALTH CARE EDUCATION/TRAINING PROGRAM

## 2024-05-01 PROCEDURE — 63600175 PHARM REV CODE 636 W HCPCS: Performed by: STUDENT IN AN ORGANIZED HEALTH CARE EDUCATION/TRAINING PROGRAM

## 2024-05-01 PROCEDURE — 21400001 HC TELEMETRY ROOM

## 2024-05-01 PROCEDURE — 80048 BASIC METABOLIC PNL TOTAL CA: CPT | Performed by: STUDENT IN AN ORGANIZED HEALTH CARE EDUCATION/TRAINING PROGRAM

## 2024-05-01 PROCEDURE — 99232 SBSQ HOSP IP/OBS MODERATE 35: CPT | Mod: ,,, | Performed by: PHYSICIAN ASSISTANT

## 2024-05-01 PROCEDURE — C9113 INJ PANTOPRAZOLE SODIUM, VIA: HCPCS | Performed by: STUDENT IN AN ORGANIZED HEALTH CARE EDUCATION/TRAINING PROGRAM

## 2024-05-01 PROCEDURE — 36415 COLL VENOUS BLD VENIPUNCTURE: CPT | Performed by: STUDENT IN AN ORGANIZED HEALTH CARE EDUCATION/TRAINING PROGRAM

## 2024-05-01 PROCEDURE — 85025 COMPLETE CBC W/AUTO DIFF WBC: CPT | Performed by: STUDENT IN AN ORGANIZED HEALTH CARE EDUCATION/TRAINING PROGRAM

## 2024-05-01 RX ADMIN — PANTOPRAZOLE SODIUM 40 MG: 40 INJECTION, POWDER, FOR SOLUTION INTRAVENOUS at 08:05

## 2024-05-01 RX ADMIN — SUCRALFATE 1 G: 1 SUSPENSION ORAL at 05:05

## 2024-05-01 RX ADMIN — AMLODIPINE BESYLATE 5 MG: 5 TABLET ORAL at 09:05

## 2024-05-01 RX ADMIN — SUCRALFATE 1 G: 1 SUSPENSION ORAL at 11:05

## 2024-05-01 RX ADMIN — SODIUM CHLORIDE, POTASSIUM CHLORIDE, SODIUM LACTATE AND CALCIUM CHLORIDE: 600; 310; 30; 20 INJECTION, SOLUTION INTRAVENOUS at 05:05

## 2024-05-01 NOTE — ASSESSMENT & PLAN NOTE
2/2 gastric ulcer with visible vessel.   Continue Protonix for a total of 72 hrs.   At d/c continue Protonix bid and carafate.   Advance diet as tolerated.   Repeat EGD in 12 weeks.

## 2024-05-01 NOTE — SUBJECTIVE & OBJECTIVE
Interval History:     No acute events overnight  Resting comfortably   Denied any neurological deficits, denied weakness/sensation loss.    Denied bowel or bladder incontinence   Denied melena/GI loss   Hemoglobin slightly trended down to 9.4   Discussed with GI, recommended 24 more hours of IV pantoprazole   We will follow up on labs in a.m.       Review of Systems      Constitutional:  Negative for fever.   HENT:  Negative for sore throat.    Respiratory:  Negative for shortness of breath.    Cardiovascular:  Negative for chest pain.   Gastrointestinal:  Negative for abdominal pain, constipation, nausea and vomiting.   Genitourinary:  Negative for dysuria.   Musculoskeletal:  Negative for back pain.   Skin:  Negative for rash.   Neurological:  Negative for seizures, speech difficulty, weakness and numbness.   Hematological:  Does not bruise/bleed easily.   Psychiatric/Behavioral:  Negative for confusion      Objective:     Vital Signs (Most Recent):  Temp: 97.9 °F (36.6 °C) (05/01/24 0704)  Pulse: 71 (05/01/24 0704)  Resp: 16 (05/01/24 0704)  BP: 130/69 (05/01/24 0704)  SpO2: 97 % (05/01/24 0704) Vital Signs (24h Range):  Temp:  [97.9 °F (36.6 °C)-98.6 °F (37 °C)] 97.9 °F (36.6 °C)  Pulse:  [70-78] 71  Resp:  [16-18] 16  SpO2:  [96 %-99 %] 97 %  BP: (107-130)/(53-69) 130/69     Weight: 60.3 kg (133 lb)  Body mass index is 21.47 kg/m².  No intake or output data in the 24 hours ending 05/01/24 1154      Physical Exam      Constitutional: She appears well-developed and well-nourished.   HENT:   Patient with bruising to the left side of her face.  She is some mild tenderness to the left orbital region.  Extraocular movements are fully intact.   Eyes: Conjunctivae and EOM are normal.   No entrapment   Neck: Neck supple.   Normal range of motion.  Cardiovascular:  Normal rate, regular rhythm, normal heart sounds and intact distal pulses.           Pulmonary/Chest: Breath sounds normal. No respiratory distress. She has  no wheezes. She has no rhonchi. She has no rales.   Abdominal: Abdomen is soft. Bowel sounds are normal.   Musculoskeletal:         General: Normal range of motion.      Cervical back: Normal range of motion and neck supple.      Comments: No weakness of her arms or legs.      Neurological: She is alert and oriented to person, place, and time. She has normal strength.  Sensation intact, no bowel or bladder incontinence noted.    Skin: Skin is warm and dry.   Psychiatric: She has a normal mood and affect. Thought content normal.      Significant Labs: All pertinent labs within the past 24 hours have been reviewed.  CBC:   Recent Labs   Lab 04/30/24 0522 05/01/24  0445   WBC 8.05 6.58   HGB 10.0* 9.4*   HCT 29.6* 27.3*    242     CMP:   Recent Labs   Lab 04/30/24 0522 05/01/24 0445    141   K 3.9 3.8    109   CO2 25 24   GLU 93 87   BUN 25* 15   CREATININE 0.6 0.6   CALCIUM 9.8 9.7   ANIONGAP 7* 8       Significant Imaging:     Imaging Results               CT Head Without Contrast (Final result)  Result time 04/29/24 11:37:43      Final result by Birgit Nash ([facility]), MD (04/29/24 11:37:43)                   Impression:      Acute 1 cm hemorrhage involving the right thalamus.  No significant mass effect or midline shift.    This report was flagged in Epic as abnormal.    All CT scans at this facility use dose modulation, iterative reconstructions, and/or weight base dosing when appropriate to reduce radiation dose to as low as reasonably achievable.      Electronically signed by: Birgit Nash MD  Date:    04/29/2024  Time:    11:37               Narrative:    EXAMINATION:  CT HEAD WITHOUT CONTRAST    CLINICAL HISTORY:  Head trauma, minor (Age >= 65y);    TECHNIQUE:  Noncontrast images were obtained.    COMPARISON:  None    FINDINGS:  There is a focal intra-axial hemorrhage involving the posterior aspect of the right thalamus measuring proximally 1 cm.  No midline shift.  Ventricles are  enlarged likely secondary to central atrophy.  The skull is intact.                                       CT Maxillofacial Without Contrast (Final result)  Result time 04/29/24 11:32:26      Final result by Birgit Nash MD ([facility]) (04/29/24 11:32:26)                   Impression:      Acute fracture involving the lateral wall left orbit with associated fractures extending through the anterolateral wall the left maxillary sinus and also involving the left infraorbital orbital rim and infraorbital foramen.    All CT scans at this facility use dose modulation, iterative reconstructions, and/or weight base dosing when appropriate to reduce radiation dose to as low as reasonably achievable.      Electronically signed by: Birgit Nash MD  Date:    04/29/2024  Time:    11:32               Narrative:    EXAMINATION:  CT MAXILLOFACIAL WITHOUT CONTRAST    CLINICAL HISTORY:  Facial trauma, blunt;    TECHNIQUE:  Standard axial and coronal facial bone CT performed.    COMPARISON:  None    FINDINGS:  There is acute fracture involving the lateral wall the left orbit.  Associated fractures are seen extending through the anterior wall and lateral walls of the left maxillary sinus.  Fracture also extends through the left infraorbital rim and appears involve the infraorbital foramen.  There is no hemorrhage seen within the maxillary sinus.  No intraorbital hematoma.  The left zygomatic arch is intact.  The pterygoid plates are intact.    The right facial bones are intact.  The nasal bones are intact.    The mandible is intact.

## 2024-05-01 NOTE — PROGRESS NOTES
[facility] Medicine  Progress Note    Patient Name: Joyce Marino  MRN: [id number]  Patient Class: IP- Inpatient   Admission Date: 4/29/2024  Length of Stay: 2 days  Attending Physician: Wero Sher,*  Primary Care Provider: Porter Vasqeus MD        Subjective:     Principal Problem:GI bleed        HPI:  78-year-old female with past medical history of hypertension, hyperlipidemia, mild aortic stenosis, GI bleed in 2022 presented to ED due to complaints of dizziness over past 3-4 weeks and dark stools over past 2-3 days.  Status post episode of fall approximately 10 days ago, denied prior syncopal episode, expressed dizziness prior to the episode.  Denied headache, nausea, vomiting, hematemesis.  Expressed taking Excedrin following fall.  Past history of GI bleed in 2022, status post EGD, colonoscopy with no acute findings.  Currently not on any blood thinners.    At baseline, ambulates without assistance, lives alone, gets support from sister.    In ED, CT head showed Acute 1 cm hemorrhage involving the right thalamus. No significant mass effect or midline shift.  ED provider discussed about CT head findings with on-call neurosurgeon Dr. 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  No evidence of intraventricular hemorrhage or hydrocephalus; stated okay keep patient in [place], no acute needs for transfer to [facility] at this point, however recommended close neuro follow up, if any acute neurological changes, then recommended stat imaging, transfer to [facility];   On examination, no neurological deficits noted, strength, sensation intact, denied bowel or bladder incontinence.    For concerns for GI bleed, ordered 2 units of PRBC, GI evaluated and recommended clear liquid diet for today, NPO since midnight for egd in am, PPI 40 mg b.i.d. IV.    Code status- full code            Overview/Hospital Course:  78-year-old female presented to ED due to complaints of dizziness over past 3-4 weeks and dark stools over past 2-3 days.  Status post episode of fall approximately 10 days ago;  Past history of GI bleed in 2022, status post EGD, colonoscopy with no acute findings.  Currently not on any blood thinners.    At baseline, ambulates without assistance, lives alone, gets support from sister.    In ED, CT head showed Acute 1 cm hemorrhage involving the right thalamus. No significant mass effect or midline shift.  ED provider discussed about CT head findings with on-call neurosurgeon Dr. 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  No evidence of intraventricular hemorrhage or hydrocephalus; stated okay keep patient in [place], no acute needs for transfer to [facility] at this point, however recommended close neuro follow up, if any acute neurological changes, then recommended stat imaging, transfer to [facility];   On examination, no neurological deficits noted, strength, sensation intact, denied bowel or bladder incontinence.    For concerns for GI bleed, ordered 2 units of PRBC on 04/29/2024,;   s/p EGD on 04/30/2024 --  Normal duodenal bulb and second portion of the                          duodenum.                          - Erosive gastropathy with no bleeding and no                          stigmata of recent bleeding. Biopsied.                          - Non-bleeding gastric ulcer with a nonbleeding                          visible vessel (Rosendo Class IIa). Injected.                          Treated with bipolar cautery.                          - Z-line regular, 40 cm from the incisors.                          - Widely patent Schatzki ring.   Recommendation:        -                           - PPI IV daily for a total of 72 hours.                          - Use Prilosec (omeprazole) 40 mg PO  daily for 3                          months.                          - Use sucralfate suspension 1 gram PO QID for 10                          days.                          - Repeat upper endoscopy in 3 months to check                          healing.                          - Return to GI office.                          -Avoid NSAIDs.       Interval History:     No acute events overnight  Resting comfortably   Denied any neurological deficits, denied weakness/sensation loss.    Denied bowel or bladder incontinence   Denied melena/GI loss   Hemoglobin slightly trended down to 9.4   Discussed with GI, recommended 24 more hours of IV pantoprazole   We will follow up on labs in a.m.       Review of Systems      Constitutional:  Negative for fever.   HENT:  Negative for sore throat.    Respiratory:  Negative for shortness of breath.    Cardiovascular:  Negative for chest pain.   Gastrointestinal:  Negative for abdominal pain, constipation, nausea and vomiting.   Genitourinary:  Negative for dysuria.   Musculoskeletal:  Negative for back pain.   Skin:  Negative for rash.   Neurological:  Negative for seizures, speech difficulty, weakness and numbness.   Hematological:  Does not bruise/bleed easily.   Psychiatric/Behavioral:  Negative for confusion      Objective:     Vital Signs (Most Recent):  Temp: 97.9 °F (36.6 °C) (05/01/24 0704)  Pulse: 71 (05/01/24 0704)  Resp: 16 (05/01/24 0704)  BP: 130/69 (05/01/24 0704)  SpO2: 97 % (05/01/24 0704) Vital Signs (24h Range):  Temp:  [97.9 °F (36.6 °C)-98.6 °F (37 °C)] 97.9 °F (36.6 °C)  Pulse:  [70-78] 71  Resp:  [16-18] 16  SpO2:  [96 %-99 %] 97 %  BP: (107-130)/(53-69) 130/69     Weight: 60.3 kg (133 lb)  Body mass index is 21.47 kg/m².  No intake or output data in the 24 hours ending 05/01/24 1154      Physical Exam      Constitutional: She appears well-developed and well-nourished.   HENT:   Patient with bruising to the left side of her face.  She is some mild tenderness  to the left orbital region.  Extraocular movements are fully intact.   Eyes: Conjunctivae and EOM are normal.   No entrapment   Neck: Neck supple.   Normal range of motion.  Cardiovascular:  Normal rate, regular rhythm, normal heart sounds and intact distal pulses.           Pulmonary/Chest: Breath sounds normal. No respiratory distress. She has no wheezes. She has no rhonchi. She has no rales.   Abdominal: Abdomen is soft. Bowel sounds are normal.   Musculoskeletal:         General: Normal range of motion.      Cervical back: Normal range of motion and neck supple.      Comments: No weakness of her arms or legs.      Neurological: She is alert and oriented to person, place, and time. She has normal strength.  Sensation intact, no bowel or bladder incontinence noted.    Skin: Skin is warm and dry.   Psychiatric: She has a normal mood and affect. Thought content normal.      Significant Labs: All pertinent labs within the past 24 hours have been reviewed.  CBC:   Recent Labs   Lab 04/30/24 0522 05/01/24  0445   WBC 8.05 6.58   HGB 10.0* 9.4*   HCT 29.6* 27.3*    242     CMP:   Recent Labs   Lab 04/30/24 0522 05/01/24  0445    141   K 3.9 3.8    109   CO2 25 24   GLU 93 87   BUN 25* 15   CREATININE 0.6 0.6   CALCIUM 9.8 9.7   ANIONGAP 7* 8       Significant Imaging:     Imaging Results               CT Head Without Contrast (Final result)  Result time 04/29/24 11:37:43      Final result by [facility]), MD (04/29/24 11:37:43)                   Impression:      Acute 1 cm hemorrhage involving the right thalamus.  No significant mass effect or midline shift.    This report was flagged in Epic as abnormal.    All CT scans at this facility use dose modulation, iterative reconstructions, and/or weight base dosing when appropriate to reduce radiation dose to as low as reasonably achievable.      Electronically signed by: Birgit Nash MD  Date:    04/29/2024  Time:    11:37                Narrative:    EXAMINATION:  CT HEAD WITHOUT CONTRAST    CLINICAL HISTORY:  Head trauma, minor (Age >= 65y);    TECHNIQUE:  Noncontrast images were obtained.    COMPARISON:  None    FINDINGS:  There is a focal intra-axial hemorrhage involving the posterior aspect of the right thalamus measuring proximally 1 cm.  No midline shift.  Ventricles are enlarged likely secondary to central atrophy.  The skull is intact.                                       CT Maxillofacial Without Contrast (Final result)  Result time 04/29/24 11:32:26      Final result [facility] MD (04/29/24 11:32:26)                   Impression:      Acute fracture involving the lateral wall left orbit with associated fractures extending through the anterolateral wall the left maxillary sinus and also involving the left infraorbital orbital rim and infraorbital foramen.    All CT scans at this facility use dose modulation, iterative reconstructions, and/or weight base dosing when appropriate to reduce radiation dose to as low as reasonably achievable.      Electronically signed by: Birgit Nash MD  Date:    04/29/2024  Time:    11:32               Narrative:    EXAMINATION:  CT MAXILLOFACIAL WITHOUT CONTRAST    CLINICAL HISTORY:  Facial trauma, blunt;    TECHNIQUE:  Standard axial and coronal facial bone CT performed.    COMPARISON:  None    FINDINGS:  There is acute fracture involving the lateral wall the left orbit.  Associated fractures are seen extending through the anterior wall and lateral walls of the left maxillary sinus.  Fracture also extends through the left infraorbital rim and appears involve the infraorbital foramen.  There is no hemorrhage seen within the maxillary sinus.  No intraorbital hematoma.  The left zygomatic arch is intact.  The pterygoid plates are intact.    The right facial bones are intact.  The nasal bones are intact.    The mandible is intact.                                       Assessment/Plan:       * GI bleed  Likely secondary to Excedrin use   History of GI bleed in 2022, EGD, colonoscopy at the time did not show acute findings   Patient has been recommended to undergo a video capsule endoscopy in 2022, however did not undergo;   For current concerns for GI bleed, ordered 2 units of PRBC, GI evaluated and recommended clear liquid diet for today, NPO since midnight for egd in am, PPI 40 mg b.i.d. IV.      Subacute intracranial hemorrhage  CT head showed Acute 1 cm hemorrhage involving the right thalamus. No significant mass effect or midline shift.  ED provider discussed about CT head findings with on-call neurosurgeon Dr. 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  No evidence of intraventricular hemorrhage or hydrocephalus; stated okay keep patient in [place], no acute needs for transfer to [facility] at this point, however recommended close neuro follow up, if any acute neurological changes, then recommended stat imaging, transfer to [facility];   On examination, no neurological deficits noted, strength, sensation intact, denied bowel or bladder incontinence.    Q.4 neurochecks, fall precautions; hold blood thinners   Target blood pressures less than 140/80  Neurosurgery follow up        HTN (hypertension)  Latest blood pressure and vitals reviewed-     Temp:  [97.9 °F (36.6 °C)-98.9 °F (37.2 °C)]   Pulse:  [73-95]   Resp:  [18-20]   BP: ()/(45-68)   SpO2:  [97 %-100 %] .   Home meds for hypertension were reviewed and noted below.   Hypertension Medications               hydroCHLOROthiazide (HYDRODIURIL) 12.5 MG Tab Take 1 tablet (12.5 mg total) by mouth once daily.    losartan (COZAAR) 50 MG tablet Take 1 tablet (50 mg total) by mouth once daily.          Given dizziness, we will hold hydrochlorothiazide, continue losartan, added amlodipine   Given concerns for possible subacute hemorrhage, we will target blood  pressure less than 140/80   Hydralazine p.r.n.         VTE Risk Mitigation (From admission, onward)           Ordered     IP VTE HIGH RISK PATIENT  Once         04/29/24 1511     Place sequential compression device  Until discontinued         04/29/24 1511                    Discharge Planning   REYNALDO: 5/1/2024     Code Status: Full Code   Is the patient medically ready for discharge?:     Reason for patient still in hospital (select all that apply): Patient trending condition, Consult recommendations, and Pending disposition  Discharge Plan A: Home                  [facility] Ana Sher MD  Department of Hospital Medicine   [facility] - Telemetry ([facility])

## 2024-05-01 NOTE — PROGRESS NOTES
[facility] - Telemetry ([facility])  Gastroenterology  Progress Note    Patient Name: Joyce Marino  MRN: [id number]  Admission Date: 4/29/2024  Hospital Length of Stay: 2 days  Code Status: Full Code   Attending Provider: Wero Sher,*  Consulting Provider: Bowen Strong PA-C  Primary Care Physician: Porter Vasques MD  Principal Problem: GI bleed        Subjective:     Interval History: The patient is feeling better. Tolerated soft foods. No BM since scopes. Hgb down a little.     Review of Systems   Constitutional:         See Interval History for daily ROS.      Objective:     Vital Signs (Most Recent):  Temp: 98.6 °F (37 °C) (05/01/24 1157)  Pulse: 71 (05/01/24 1157)  Resp: 18 (05/01/24 1157)  BP: (!) 109/55 (05/01/24 1157)  SpO2: 95 % (05/01/24 1157) Vital Signs (24h Range):  Temp:  [97.9 °F (36.6 °C)-98.6 °F (37 °C)] 98.6 °F (37 °C)  Pulse:  [70-78] 71  Resp:  [16-18] 18  SpO2:  [95 %-99 %] 95 %  BP: (107-130)/(53-69) 109/55     Weight: 60.3 kg (133 lb) (04/30/24 1040)  Body mass index is 21.47 kg/m².    No intake or output data in the 24 hours ending 05/01/24 1443    Lines/Drains/Airways       Peripheral Intravenous Line  Duration                  Peripheral IV - Single Lumen 04/29/24 0910 20 G Left Antecubital 2 days         Peripheral IV - Single Lumen 04/29/24 1120 20 G Right Antecubital 2 days                     Physical Exam  Constitutional:       Appearance: Normal appearance. She is well-developed.   HENT:      Head: Normocephalic and atraumatic.   Eyes:      Extraocular Movements: Extraocular movements intact.   Pulmonary:      Effort: Pulmonary effort is normal. No respiratory distress.   Abdominal:      General: Bowel sounds are normal. There is no distension.      Tenderness: There is no abdominal tenderness.   Skin:     General: Skin is dry.   Neurological:      Mental Status: She is alert and oriented to person, place, and time.      Cranial Nerves: No cranial nerve deficit.    Psychiatric:         Behavior: Behavior normal.          Significant Labs:  CBC:   Recent Labs   Lab 04/30/24  0522 05/01/24  0445   WBC 8.05 6.58   HGB 10.0* 9.4*   HCT 29.6* 27.3*    242         Significant Imaging:  Imaging results within the past 24 hours have been reviewed.  Assessment/Plan:     Oncology  Normocytic anemia  Monitor and transfuse as indicated.         GI  * GI bleed  2/2 gastric ulcer with visible vessel.   Continue Protonix for a total of 72 hrs.   At d/c continue Protonix bid and carafate.   Advance diet as tolerated.   Repeat EGD in 12 weeks.         Thank you for your consult. I will sign off. Please contact us if you have any additional questions.    Bowen Strong PA-C  Gastroenterology  [facility] - Telemetry ([facility])

## 2024-05-01 NOTE — PLAN OF CARE
[facility] - Telemetry (Hospital)  Initial Discharge Assessment       Primary Care Provider: Porter Vasques MD    Admission Diagnosis: Shortness of breath [R06.02]  Melena [K92.1]  Syncope [R55]  GI bleed [K92.2]  Orbital fracture, closed, initial encounter [S02.85XA]    Admission Date: 4/29/2024  Expected Discharge Date:     Transition of Care Barriers: (P) None    Payor: [employer] MANAGED MEDICARE / Plan: [employer] MEDICARE PLAN PPO / Product Type: Medicare Advantage /     Extended Emergency Contact Information  Primary Emergency Contact: Kiya Pino   [place]  Mobile Phone: [phone]  Relation: Sister  Secondary Emergency Contact: Shaina Torres  Mobile Phone: [phone]  Relation: Sister  Preferred language: English   needed? No    Discharge Plan A: (P) Home  Discharge Plan B: (P) Home with family      [facility] [address] [address] [place] [address]  [address] [address] [address]  Phone: [phone] Fax: [phone]    [facility] - [address] [address]  Phone: [phone] Fax: [phone]      Initial Assessment (most recent)       Adult Discharge Assessment - 05/01/24 0902          Discharge Assessment    Assessment Type Discharge Planning Assessment (P)      Confirmed/corrected address, phone number and insurance Yes (P)      Confirmed Demographics Correct on Facesheet (P)      Source of Information patient (P)      When was your last doctors appointment? 11/13/23 (P)      Communicated REYNALDO with patient/caregiver Yes (P)      Reason For Admission GI bleed (P)      People in Home alone (P)      Facility Arrived From: home (P)      Do you expect to return to your current living situation? Yes (P)      Do you have help at home or someone to help you manage your care at home? No (P)      Prior to hospitilization cognitive status: Alert/Oriented (P)      Current cognitive  status: Alert/Oriented (P)      Walking or Climbing Stairs Difficulty no (P)      Dressing/Bathing Difficulty no (P)      Equipment Currently Used at Home none (P)      Readmission within 30 days? No (P)      Patient currently being followed by outpatient case management? No (P)      Do you currently have service(s) that help you manage your care at home? No (P)      Do you take prescription medications? Yes (P)      Do you have prescription coverage? Yes (P)      Coverage [employer] (P)      Do you have any problems affording any of your prescribed medications? No (P)      Who is going to help you get home at discharge? sister (P)      How do you get to doctors appointments? car, drives self (P)      Are you on dialysis? No (P)      Do you take coumadin? No (P)      Discharge Plan A Home (P)      Discharge Plan B Home with family (P)      DME Needed Upon Discharge  none (P)      Discharge Plan discussed with: Patient (P)      Transition of Care Barriers None (P)         Housing Stability    In the last 12 months, was there a time when you were not able to pay the mortgage or rent on time? No (P)      At any time in the past 12 months, were you homeless or living in a shelter (including now)? No (P)         Transportation Needs    In the past 12 months, has lack of transportation kept you from medical appointments or from getting medications? No (P)      In the past 12 months, has lack of transportation kept you from meetings, work, or from getting things needed for daily living? No (P)         Food Insecurity    Within the past 12 months, you worried that your food would run out before you got the money to buy more. Never true (P)      Within the past 12 months, the food you bought just didn't last and you didn't have money to get more. Never true (P)                       Patient lives alone and is independent with ADL's.  Patient's sister lives one block away and can assist if needed.  Currently no needs.

## 2024-05-01 NOTE — PLAN OF CARE
Updated patient on plan of care. Tolerating full liquid diet. No neurological changes. Instructed patient to use call light for assistance, call light in reach. Hourly rounding performed. Vitals q4 hours. Education provided, questions answered/encouraged. Chart check complete. NSR    Problem: Adult Inpatient Plan of Care  Goal: Plan of Care Review  Outcome: Progressing

## 2024-05-01 NOTE — SUBJECTIVE & OBJECTIVE
Subjective:     Interval History: The patient is feeling better. Tolerated soft foods. No BM since scopes. Hgb down a little.     Review of Systems   Constitutional:         See Interval History for daily ROS.      Objective:     Vital Signs (Most Recent):  Temp: 98.6 °F (37 °C) (05/01/24 1157)  Pulse: 71 (05/01/24 1157)  Resp: 18 (05/01/24 1157)  BP: (!) 109/55 (05/01/24 1157)  SpO2: 95 % (05/01/24 1157) Vital Signs (24h Range):  Temp:  [97.9 °F (36.6 °C)-98.6 °F (37 °C)] 98.6 °F (37 °C)  Pulse:  [70-78] 71  Resp:  [16-18] 18  SpO2:  [95 %-99 %] 95 %  BP: (107-130)/(53-69) 109/55     Weight: 60.3 kg (133 lb) (04/30/24 1040)  Body mass index is 21.47 kg/m².    No intake or output data in the 24 hours ending 05/01/24 1443    Lines/Drains/Airways       Peripheral Intravenous Line  Duration                  Peripheral IV - Single Lumen 04/29/24 0910 20 G Left Antecubital 2 days         Peripheral IV - Single Lumen 04/29/24 1120 20 G Right Antecubital 2 days                     Physical Exam  Constitutional:       Appearance: Normal appearance. She is well-developed.   HENT:      Head: Normocephalic and atraumatic.   Eyes:      Extraocular Movements: Extraocular movements intact.   Pulmonary:      Effort: Pulmonary effort is normal. No respiratory distress.   Abdominal:      General: Bowel sounds are normal. There is no distension.      Tenderness: There is no abdominal tenderness.   Skin:     General: Skin is dry.   Neurological:      Mental Status: She is alert and oriented to person, place, and time.      Cranial Nerves: No cranial nerve deficit.   Psychiatric:         Behavior: Behavior normal.          Significant Labs:  CBC:   Recent Labs   Lab 04/30/24  0522 05/01/24  0445   WBC 8.05 6.58   HGB 10.0* 9.4*   HCT 29.6* 27.3*    242         Significant Imaging:  Imaging results within the past 24 hours have been reviewed.

## 2024-05-02 VITALS
WEIGHT: 133 LBS | TEMPERATURE: 98 F | SYSTOLIC BLOOD PRESSURE: 134 MMHG | HEIGHT: 66 IN | OXYGEN SATURATION: 96 % | HEART RATE: 76 BPM | RESPIRATION RATE: 14 BRPM | DIASTOLIC BLOOD PRESSURE: 60 MMHG | BODY MASS INDEX: 21.38 KG/M2

## 2024-05-02 LAB
ANION GAP SERPL CALC-SCNC: 10 MMOL/L (ref 8–16)
BASOPHILS # BLD AUTO: 0.08 K/UL (ref 0–0.2)
BASOPHILS NFR BLD: 0.8 % (ref 0–1.9)
BUN SERPL-MCNC: 8 MG/DL (ref 8–23)
CALCIUM SERPL-MCNC: 10 MG/DL (ref 8.7–10.5)
CHLORIDE SERPL-SCNC: 108 MMOL/L (ref 95–110)
CO2 SERPL-SCNC: 22 MMOL/L (ref 23–29)
CREAT SERPL-MCNC: 0.6 MG/DL (ref 0.5–1.4)
DIFFERENTIAL METHOD BLD: ABNORMAL
EOSINOPHIL # BLD AUTO: 0.3 K/UL (ref 0–0.5)
EOSINOPHIL NFR BLD: 3.3 % (ref 0–8)
ERYTHROCYTE [DISTWIDTH] IN BLOOD BY AUTOMATED COUNT: 13.8 % (ref 11.5–14.5)
EST. GFR  (NO RACE VARIABLE): >60 ML/MIN/1.73 M^2
GLUCOSE SERPL-MCNC: 104 MG/DL (ref 70–110)
HCT VFR BLD AUTO: 29.8 % (ref 37–48.5)
HGB BLD-MCNC: 10.1 G/DL (ref 12–16)
IMM GRANULOCYTES # BLD AUTO: 0.04 K/UL (ref 0–0.04)
IMM GRANULOCYTES NFR BLD AUTO: 0.4 % (ref 0–0.5)
LYMPHOCYTES # BLD AUTO: 1.3 K/UL (ref 1–4.8)
LYMPHOCYTES NFR BLD: 12.7 % (ref 18–48)
MCH RBC QN AUTO: 29.2 PG (ref 27–31)
MCHC RBC AUTO-ENTMCNC: 33.9 G/DL (ref 32–36)
MCV RBC AUTO: 86 FL (ref 82–98)
MONOCYTES # BLD AUTO: 1.1 K/UL (ref 0.3–1)
MONOCYTES NFR BLD: 10.6 % (ref 4–15)
NEUTROPHILS # BLD AUTO: 7.4 K/UL (ref 1.8–7.7)
NEUTROPHILS NFR BLD: 72.2 % (ref 38–73)
NRBC BLD-RTO: 0 /100 WBC
PLATELET # BLD AUTO: 283 K/UL (ref 150–450)
PMV BLD AUTO: 10.1 FL (ref 9.2–12.9)
POTASSIUM SERPL-SCNC: 3.5 MMOL/L (ref 3.5–5.1)
RBC # BLD AUTO: 3.46 M/UL (ref 4–5.4)
SODIUM SERPL-SCNC: 140 MMOL/L (ref 136–145)
WBC # BLD AUTO: 10.2 K/UL (ref 3.9–12.7)

## 2024-05-02 PROCEDURE — 85025 COMPLETE CBC W/AUTO DIFF WBC: CPT | Performed by: STUDENT IN AN ORGANIZED HEALTH CARE EDUCATION/TRAINING PROGRAM

## 2024-05-02 PROCEDURE — 80048 BASIC METABOLIC PNL TOTAL CA: CPT | Performed by: STUDENT IN AN ORGANIZED HEALTH CARE EDUCATION/TRAINING PROGRAM

## 2024-05-02 PROCEDURE — C9113 INJ PANTOPRAZOLE SODIUM, VIA: HCPCS | Performed by: STUDENT IN AN ORGANIZED HEALTH CARE EDUCATION/TRAINING PROGRAM

## 2024-05-02 PROCEDURE — 36415 COLL VENOUS BLD VENIPUNCTURE: CPT | Performed by: STUDENT IN AN ORGANIZED HEALTH CARE EDUCATION/TRAINING PROGRAM

## 2024-05-02 PROCEDURE — 63600175 PHARM REV CODE 636 W HCPCS: Performed by: STUDENT IN AN ORGANIZED HEALTH CARE EDUCATION/TRAINING PROGRAM

## 2024-05-02 PROCEDURE — 25000003 PHARM REV CODE 250: Performed by: STUDENT IN AN ORGANIZED HEALTH CARE EDUCATION/TRAINING PROGRAM

## 2024-05-02 RX ORDER — SUCRALFATE 1 G/1
1 TABLET ORAL EVERY 6 HOURS
Qty: 32 TABLET | Refills: 0 | Status: SHIPPED | OUTPATIENT
Start: 2024-05-02 | End: 2024-05-10

## 2024-05-02 RX ORDER — OMEPRAZOLE 40 MG/1
40 CAPSULE, DELAYED RELEASE ORAL DAILY
Qty: 90 CAPSULE | Refills: 0 | Status: SHIPPED | OUTPATIENT
Start: 2024-05-02 | End: 2024-07-31

## 2024-05-02 RX ADMIN — PANTOPRAZOLE SODIUM 40 MG: 40 INJECTION, POWDER, FOR SOLUTION INTRAVENOUS at 08:05

## 2024-05-02 RX ADMIN — ACETAMINOPHEN 500 MG: 500 TABLET ORAL at 05:05

## 2024-05-02 RX ADMIN — SUCRALFATE 1 G: 1 SUSPENSION ORAL at 12:05

## 2024-05-02 RX ADMIN — LOSARTAN POTASSIUM 50 MG: 50 TABLET, FILM COATED ORAL at 08:05

## 2024-05-02 RX ADMIN — SUCRALFATE 1 G: 1 SUSPENSION ORAL at 05:05

## 2024-05-02 RX ADMIN — AMLODIPINE BESYLATE 5 MG: 5 TABLET ORAL at 08:05

## 2024-05-02 NOTE — PLAN OF CARE
[facility] - Telemetry (Hospital)  Discharge Final Note    Primary Care Provider: Porter Vasques MD    Expected Discharge Date: 5/2/2024    Final Discharge Note (most recent)       Final Note - 05/02/24 1225          Final Note    Assessment Type Final Discharge Note (P)      Anticipated Discharge Disposition Home or Self Care (P)      Hospital Resources/Appts/Education Provided Appointments scheduled and added to AVS (P)         Post-Acute Status    Discharge Delays None known at this time (P)                      Important Message from Medicare             Contact Info       Porter Vasques MD   Specialty: Family Medicine   Relationship: PCP - General    [address] [address] [address]   Phone: [phone]       Next Steps: Follow up in 1 week(s)

## 2024-05-02 NOTE — DISCHARGE SUMMARY
[facility] - Telemetry ([facility] Medicine  Discharge Summary      Patient Name: Joyce Marino  MRN: [id number]  RELL: [id number]  Patient Class: IP- Inpatient  Admission Date: 4/29/2024  Hospital Length of Stay: 3 days  Discharge Date and Time: 5/2/24  Attending Physician: Wero Sher,*   Discharging Provider: Wero Sher MD  Primary Care Provider: Porter Vasques MD    Primary Care Team: [facility] MEDICINE A    HPI:   78-year-old female with past medical history of hypertension, hyperlipidemia, mild aortic stenosis, GI bleed in 2022 presented to ED due to complaints of dizziness over past 3-4 weeks and dark stools over past 2-3 days.  Status post episode of fall approximately 10 days ago, denied prior syncopal episode, expressed dizziness prior to the episode.  Denied headache, nausea, vomiting, hematemesis.  Expressed taking Excedrin following fall.  Past history of GI bleed in 2022, status post EGD, colonoscopy with no acute findings.  Currently not on any blood thinners.    At baseline, ambulates without assistance, lives alone, gets support from sister.    In ED, CT head showed Acute 1 cm hemorrhage involving the right thalamus. No significant mass effect or midline shift.  ED provider discussed about CT head findings with on-call neurosurgeon Dr. 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  No evidence of intraventricular hemorrhage or hydrocephalus; stated okay keep patient in [place], no acute needs for transfer to [facility] at this point, however recommended close neuro follow up, if any acute neurological changes, then recommended stat imaging, transfer to [facility];   On examination, no neurological deficits noted, strength, sensation intact, denied bowel or bladder incontinence.    For concerns for GI bleed, ordered 2 units of PRBC, GI evaluated and recommended clear liquid diet for  today, NPO since midnight for egd in am, PPI 40 mg b.i.d. IV.    Code status- full code           Procedure(s) (LRB):  EGD (ESOPHAGOGASTRODUODENOSCOPY) (N/A)      Hospital Course:   78-year-old female presented to ED due to complaints of dizziness over past 3-4 weeks and dark stools over past 2-3 days.  Status post episode of fall approximately 10 days ago;  Past history of GI bleed in 2022, status post EGD, colonoscopy with no acute findings.  Currently not on any blood thinners.    At baseline, ambulates without assistance, lives alone, gets support from sister.    In ED, CT head showed Acute 1 cm hemorrhage involving the right thalamus. No significant mass effect or midline shift.  ED provider discussed about CT head findings with on-call neurosurgeon Dr. 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  No evidence of intraventricular hemorrhage or hydrocephalus; stated okay keep patient in [place], no acute needs for transfer to [facility] at this point, however recommended close neuro follow up, if any acute neurological changes, then recommended stat imaging, transfer to [facility];   On examination, no neurological deficits noted, strength, sensation intact, denied bowel or bladder incontinence.    For concerns for GI bleed, ordered 2 units of PRBC on 04/29/2024,;   s/p EGD on 04/30/2024 --  Normal duodenal bulb and second portion of the duodenum.  Erosive gastropathy with no bleeding and stigmata of recent bleeding. Biopsied. Non-bleeding gastric ulcer with a nonbleeding ; visible vessel (Rosendo Class IIa). Injected. Treated with bipolar cautery. Z-line regular, 40 cm from the incisors. Widely patent Schatzki ring.   Recommendation:        -  PPI IV daily for a total of 72 hours.  Use Prilosec (omeprazole) 40 mg PO daily for 3 months. Use sucralfate suspension 1 gram PO QID for 10 days. Repeat upper endoscopy in 3 months to  check healing. Return to GI office. Avoid NSAIDs.     On 5/2/24, examination of patient denied bedside, appeared alert and oriented x3, denied acute issues overnight.    Denied headache, dizziness, chest pain, shortness OO breath, nausea, vomiting, bowel or bladder incontinence, upper or lower extremity weakness.    Noted to have an episode of dark stool last night, hemoglobin trended up, received 72 hours of PPI IV per GI recommendations.  Discussed with Gastroenterology, stated okay for discharge from GI standpoint, recommended omeprazole 40 mg p.o. daily for 3 months, sucralfate 1 g p.o. q.i.d. for 10 days, repeat upper endoscopy in 3 months to check healing.  To avoid NSAIDs.    Also discussed with Neurosurgery regarding any need for repeat imaging, Dr. Miller stated that given no neurological deficits at this point, patient able to ambulate without issues, no need for repeat imaging at this point, recommended outpatient follow up.    Considering clinical and hemodynamic stability, planning to discharge patient today, emphasized on compliance with medications, follow up with PCP/GI/neurosurgery upon discharge, patient expressed understanding, agreed with the plan   Medications to be delivered bedside              Goals of Care Treatment Preferences:  Code Status: Full Code      Consults:   Consults (From admission, onward)          Status Ordering Provider     Inpatient consult to Gastroenterology  Once        Provider:  (Not yet assigned)    CYNTHIA Monroe new Assessment & Plan notes have been filed under this hospital service since the last note was generated.  Service: Hospital Medicine    Final Active Diagnoses:    Diagnosis Date Noted POA    PRINCIPAL PROBLEM:  GI bleed [K92.2] 04/29/2024 Yes    Normocytic anemia [D64.9] 04/30/2024 Yes    Subacute intracranial hemorrhage [I62.9] 04/29/2024 Unknown    Statin intolerance [Z78.9] 04/04/2014 Yes    HTN (hypertension) [I10]  10/01/2013 Yes      Problems Resolved During this Admission:    Diagnosis Date Noted Date Resolved POA    Bleeding in brain [I61.9] 04/29/2024 04/29/2024 Unknown       Discharged Condition: good    Disposition: Home or Self Care    Follow Up:   Follow-up Information       Porter Vasques MD Follow up in 1 week(s).    Specialty: Family Medicine  Contact information:  [address] [address] [address] [phone]                           Patient Instructions:   No discharge procedures on file.    Significant Diagnostic Studies:     Results for orders placed or performed during the hospital encounter of 04/29/24   CBC auto differential   Result Value Ref Range    WBC 8.07 3.90 - 12.70 K/uL    RBC 2.44 (L) 4.00 - 5.40 M/uL    Hemoglobin 7.1 (L) 12.0 - 16.0 g/dL    Hematocrit 21.5 (L) 37.0 - 48.5 %    MCV 88 82 - 98 fL    MCH 29.1 27.0 - 31.0 pg    MCHC 33.0 32.0 - 36.0 g/dL    RDW 13.2 11.5 - 14.5 %    Platelets 276 150 - 450 K/uL    MPV 10.3 9.2 - 12.9 fL    Immature Granulocytes 0.5 0.0 - 0.5 %    Gran # (ANC) 5.9 1.8 - 7.7 K/uL    Immature Grans (Abs) 0.04 0.00 - 0.04 K/uL    Lymph # 1.3 1.0 - 4.8 K/uL    Mono # 0.6 0.3 - 1.0 K/uL    Eos # 0.2 0.0 - 0.5 K/uL    Baso # 0.07 0.00 - 0.20 K/uL    nRBC 0 0 /100 WBC    Gran % 73.1 (H) 38.0 - 73.0 %    Lymph % 15.6 (L) 18.0 - 48.0 %    Mono % 7.4 4.0 - 15.0 %    Eosinophil % 2.5 0.0 - 8.0 %    Basophil % 0.9 0.0 - 1.9 %    Differential Method Automated    Comprehensive metabolic panel   Result Value Ref Range    Sodium 137 136 - 145 mmol/L    Potassium 3.5 3.5 - 5.1 mmol/L    Chloride 107 95 - 110 mmol/L    CO2 22 (L) 23 - 29 mmol/L    Glucose 115 (H) 70 - 110 mg/dL    BUN 45 (H) 8 - 23 mg/dL    Creatinine 0.7 0.5 - 1.4 mg/dL    Calcium 10.4 8.7 - 10.5 mg/dL    Total Protein 6.2 6.0 - 8.4 g/dL    Albumin 3.6 3.5 - 5.2 g/dL    Total Bilirubin 1.0 0.1 - 1.0 mg/dL    Alkaline Phosphatase 84 55 - 135 U/L    AST 13 10 - 40 U/L    ALT 11 10 - 44 U/L    eGFR >60 >60  mL/min/1.73 m^2    Anion Gap 8 8 - 16 mmol/L   Protime-INR   Result Value Ref Range    Prothrombin Time 10.9 9.0 - 12.5 sec    INR 0.9 0.8 - 1.2   APTT   Result Value Ref Range    aPTT 23.5 21.0 - 32.0 sec   Urinalysis, Reflex to Urine Culture Urine, Clean Catch    Specimen: Urine   Result Value Ref Range    Specimen UA Urine, Clean Catch     Color, UA Yellow Yellow, Straw, Charmaine    Appearance, UA Clear Clear    pH, UA 5.0 5.0 - 8.0    Specific Gravity, UA 1.020 1.005 - 1.030    Protein, UA Negative Negative    Glucose, UA Negative Negative    Ketones, UA Negative Negative    Bilirubin (UA) Negative Negative    Occult Blood UA 2+ (A) Negative    Nitrite, UA Negative Negative    Urobilinogen, UA Negative <2.0 EU/dL    Leukocytes, UA Negative Negative   Occult blood x 1, stool    Specimen: Stool   Result Value Ref Range    Occult Blood Positive (A) Negative   Urinalysis Microscopic   Result Value Ref Range    RBC, UA 1 0 - 4 /hpf    WBC, UA 1 0 - 5 /hpf    Bacteria Rare None-Occ /hpf    Hyaline Casts, UA 4 (A) 0-1/lpf /lpf    Microscopic Comment SEE COMMENT    CBC Auto Differential   Result Value Ref Range    WBC 8.05 3.90 - 12.70 K/uL    RBC 3.45 (L) 4.00 - 5.40 M/uL    Hemoglobin 10.0 (L) 12.0 - 16.0 g/dL    Hematocrit 29.6 (L) 37.0 - 48.5 %    MCV 86 82 - 98 fL    MCH 29.0 27.0 - 31.0 pg    MCHC 33.8 32.0 - 36.0 g/dL    RDW 13.9 11.5 - 14.5 %    Platelets 252 150 - 450 K/uL    MPV 9.9 9.2 - 12.9 fL    Immature Granulocytes 0.4 0.0 - 0.5 %    Gran # (ANC) 5.1 1.8 - 7.7 K/uL    Immature Grans (Abs) 0.03 0.00 - 0.04 K/uL    Lymph # 1.8 1.0 - 4.8 K/uL    Mono # 0.7 0.3 - 1.0 K/uL    Eos # 0.4 0.0 - 0.5 K/uL    Baso # 0.07 0.00 - 0.20 K/uL    nRBC 0 0 /100 WBC    Gran % 63.1 38.0 - 73.0 %    Lymph % 21.7 18.0 - 48.0 %    Mono % 8.9 4.0 - 15.0 %    Eosinophil % 5.0 0.0 - 8.0 %    Basophil % 0.9 0.0 - 1.9 %    Differential Method Automated    Basic Metabolic Panel   Result Value Ref Range    Sodium 141 136 - 145 mmol/L     Potassium 3.9 3.5 - 5.1 mmol/L    Chloride 109 95 - 110 mmol/L    CO2 25 23 - 29 mmol/L    Glucose 93 70 - 110 mg/dL    BUN 25 (H) 8 - 23 mg/dL    Creatinine 0.6 0.5 - 1.4 mg/dL    Calcium 9.8 8.7 - 10.5 mg/dL    Anion Gap 7 (L) 8 - 16 mmol/L    eGFR >60 >60 mL/min/1.73 m^2   BNP   Result Value Ref Range     (H) 0 - 99 pg/mL   Troponin I   Result Value Ref Range    Troponin I 0.016 0.000 - 0.026 ng/mL   CBC Auto Differential   Result Value Ref Range    WBC 6.58 3.90 - 12.70 K/uL    RBC 3.20 (L) 4.00 - 5.40 M/uL    Hemoglobin 9.4 (L) 12.0 - 16.0 g/dL    Hematocrit 27.3 (L) 37.0 - 48.5 %    MCV 85 82 - 98 fL    MCH 29.4 27.0 - 31.0 pg    MCHC 34.4 32.0 - 36.0 g/dL    RDW 13.6 11.5 - 14.5 %    Platelets 242 150 - 450 K/uL    MPV 10.2 9.2 - 12.9 fL    Immature Granulocytes 0.3 0.0 - 0.5 %    Gran # (ANC) 4.0 1.8 - 7.7 K/uL    Immature Grans (Abs) 0.02 0.00 - 0.04 K/uL    Lymph # 1.5 1.0 - 4.8 K/uL    Mono # 0.7 0.3 - 1.0 K/uL    Eos # 0.4 0.0 - 0.5 K/uL    Baso # 0.06 0.00 - 0.20 K/uL    nRBC 0 0 /100 WBC    Gran % 61.1 38.0 - 73.0 %    Lymph % 22.3 18.0 - 48.0 %    Mono % 9.9 4.0 - 15.0 %    Eosinophil % 5.5 0.0 - 8.0 %    Basophil % 0.9 0.0 - 1.9 %    Differential Method Automated    Basic Metabolic Panel   Result Value Ref Range    Sodium 141 136 - 145 mmol/L    Potassium 3.8 3.5 - 5.1 mmol/L    Chloride 109 95 - 110 mmol/L    CO2 24 23 - 29 mmol/L    Glucose 87 70 - 110 mg/dL    BUN 15 8 - 23 mg/dL    Creatinine 0.6 0.5 - 1.4 mg/dL    Calcium 9.7 8.7 - 10.5 mg/dL    Anion Gap 8 8 - 16 mmol/L    eGFR >60 >60 mL/min/1.73 m^2   CBC Auto Differential   Result Value Ref Range    WBC 10.20 3.90 - 12.70 K/uL    RBC 3.46 (L) 4.00 - 5.40 M/uL    Hemoglobin 10.1 (L) 12.0 - 16.0 g/dL    Hematocrit 29.8 (L) 37.0 - 48.5 %    MCV 86 82 - 98 fL    MCH 29.2 27.0 - 31.0 pg    MCHC 33.9 32.0 - 36.0 g/dL    RDW 13.8 11.5 - 14.5 %    Platelets 283 150 - 450 K/uL    MPV 10.1 9.2 - 12.9 fL    Immature Granulocytes 0.4 0.0 - 0.5 %     Gran # (ANC) 7.4 1.8 - 7.7 K/uL    Immature Grans (Abs) 0.04 0.00 - 0.04 K/uL    Lymph # 1.3 1.0 - 4.8 K/uL    Mono # 1.1 (H) 0.3 - 1.0 K/uL    Eos # 0.3 0.0 - 0.5 K/uL    Baso # 0.08 0.00 - 0.20 K/uL    nRBC 0 0 /100 WBC    Gran % 72.2 38.0 - 73.0 %    Lymph % 12.7 (L) 18.0 - 48.0 %    Mono % 10.6 4.0 - 15.0 %    Eosinophil % 3.3 0.0 - 8.0 %    Basophil % 0.8 0.0 - 1.9 %    Differential Method Automated    Basic Metabolic Panel   Result Value Ref Range    Sodium 140 136 - 145 mmol/L    Potassium 3.5 3.5 - 5.1 mmol/L    Chloride 108 95 - 110 mmol/L    CO2 22 (L) 23 - 29 mmol/L    Glucose 104 70 - 110 mg/dL    BUN 8 8 - 23 mg/dL    Creatinine 0.6 0.5 - 1.4 mg/dL    Calcium 10.0 8.7 - 10.5 mg/dL    Anion Gap 10 8 - 16 mmol/L    eGFR >60 >60 mL/min/1.73 m^2   EKG 12-lead   Result Value Ref Range    QRS Duration 98 ms    [facility]HS QTC Calculation 440 ms   Echo   Result Value Ref Range    BSA 1.68 m2    LVOT stroke volume 61.19 cm3    LVIDd 4.25 3.5 - 6.0 cm    LV Systolic Volume 29.45 mL    LV Systolic Volume Index 17.5 mL/m2    LVIDs 2.80 2.1 - 4.0 cm    LV Diastolic Volume 80.77 mL    LV Diastolic Volume Index 48.08 mL/m2    IVS 1.08 0.6 - 1.1 cm    LVOT diameter 1.95 cm    LVOT area 3.0 cm2    FS 34 28 - 44 %    Left Ventricle Relative Wall Thickness 0.48 cm    Posterior Wall 1.03 0.6 - 1.1 cm    LV mass 150.77 g    LV Mass Index 90 g/m2    MV Peak E Renaldo 0.81 m/s    TDI LATERAL 0.10 m/s    TDI SEPTAL 0.08 m/s    E/E' ratio 9.00 m/s    MV Peak A Renaldo 0.95 m/s    TR Max Renaldo 2.61 m/s    E/A ratio 0.85     IVRT 82.78 msec    E wave deceleration time 249.05 msec    LV SEPTAL E/E' RATIO 10.13 m/s    LV LATERAL E/E' RATIO 8.10 m/s    LVOT peak renaldo 0.97 m/s    Left Ventricular Outflow Tract Mean Velocity 0.72 cm/s    Left Ventricular Outflow Tract Mean Gradient 2.32 mmHg    RVOT peak VTI 22.8 cm    TAPSE 2.24 cm    LA size 3.32 cm    Left Atrium Minor Axis 4.40 cm    Left Atrium Major Axis 5.59 cm    RA Major Axis 4.37 cm    AV  mean gradient 29 mmHg    AV peak gradient 39 mmHg    Ao peak shaneka 3.12 m/s    Ao VTI 79.10 cm    LVOT peak VTI 20.50 cm    AV valve area 0.77 cm²    AV Velocity Ratio 0.31     AV index (prosthetic) 0.26     LYNDSEY by Velocity Ratio 0.93 cm²    Triscuspid Valve Regurgitation Peak Gradient 27 mmHg    PV mean gradient 2 mmHg    RVOT peak shaneka 1.00 m/s    Ao root annulus 3.21 cm    STJ 3.12 cm    Ascending aorta 3.31 cm    IVC diameter 1.25 cm    Mean e' 0.09 m/s    ZLVIDS -0.29     ZLVIDD -0.98     LA Volume Index 25.6 mL/m2    LA volume 43.08 cm3    LA WIDTH 3.1 cm    RA Width 3.0 cm    EF 60 %    TV resting pulmonary artery pressure 30 mmHg    RV TB RVSP 6 mmHg    Est. RA pres 3 mmHg   Type & Screen   Result Value Ref Range    Group & Rh A POS     Indirect Rk NEG     Specimen Outdate 05/02/2024 23:59    Prepare RBC 2 Units; Anemia   Result Value Ref Range    UNIT NUMBER [id number]     Product Code K4814H45     DISPENSE STATUS TRANSFUSED     CODING SYSTEM SWDE495     Unit Blood Type Code 6200     Unit Blood Type A POS     Unit Expiration [id number]     CROSSMATCH INTERPRETATION Compatible     UNIT NUMBER [id number]     Product Code B9488V15     DISPENSE STATUS TRANSFUSED     CODING SYSTEM [id number]     Unit Blood Type Code 6200     Unit Blood Type A POS     Unit Expiration [id number]     CROSSMATCH INTERPRETATION Compatible         Imaging Results               CT Head Without Contrast (Final result)  Result time 04/29/24 11:37:43      Final result by Birgit Nash MD (Timothy) (04/29/24 11:37:43)                   Impression:      Acute 1 cm hemorrhage involving the right thalamus.  No significant mass effect or midline shift.    This report was flagged in Epic as abnormal.    All CT scans at this facility use dose modulation, iterative reconstructions, and/or weight base dosing when appropriate to reduce radiation dose to as low as reasonably achievable.      Electronically signed by: Birgit Nash  MD  Date:    04/29/2024  Time:    11:37               Narrative:    EXAMINATION:  CT HEAD WITHOUT CONTRAST    CLINICAL HISTORY:  Head trauma, minor (Age >= 65y);    TECHNIQUE:  Noncontrast images were obtained.    COMPARISON:  None    FINDINGS:  There is a focal intra-axial hemorrhage involving the posterior aspect of the right thalamus measuring proximally 1 cm.  No midline shift.  Ventricles are enlarged likely secondary to central atrophy.  The skull is intact.                                       CT Maxillofacial Without Contrast (Final result)  Result time 04/29/24 11:32:26      Final result by Birgit NashRejiMD caitlin (04/29/24 11:32:26)                   Impression:      Acute fracture involving the lateral wall left orbit with associated fractures extending through the anterolateral wall the left maxillary sinus and also involving the left infraorbital orbital rim and infraorbital foramen.    All CT scans at this facility use dose modulation, iterative reconstructions, and/or weight base dosing when appropriate to reduce radiation dose to as low as reasonably achievable.      Electronically signed by: Birgit Nash MD  Date:    04/29/2024  Time:    11:32               Narrative:    EXAMINATION:  CT MAXILLOFACIAL WITHOUT CONTRAST    CLINICAL HISTORY:  Facial trauma, blunt;    TECHNIQUE:  Standard axial and coronal facial bone CT performed.    COMPARISON:  None    FINDINGS:  There is acute fracture involving the lateral wall the left orbit.  Associated fractures are seen extending through the anterior wall and lateral walls of the left maxillary sinus.  Fracture also extends through the left infraorbital rim and appears involve the infraorbital foramen.  There is no hemorrhage seen within the maxillary sinus.  No intraorbital hematoma.  The left zygomatic arch is intact.  The pterygoid plates are intact.    The right facial bones are intact.  The nasal bones are intact.    The mandible is intact.                                        Pending Diagnostic Studies:       Procedure Component Value Units Date/Time    Specimen to Pathology, Surgery Gastrointestinal tract [[id number]] Collected: 04/30/24 1105    Order Status: Sent Lab Status: In process Updated: 04/30/24 1348    Specimen: Tissue            Medications:       Medication List        START taking these medications      sucralfate 1 gram tablet  Commonly known as: CARAFATE  Take 1 tablet (1 g total) by mouth every 6 (six) hours. for 8 days            CHANGE how you take these medications      omeprazole 40 MG capsule  Commonly known as: PRILOSEC  Take 1 capsule (40 mg total) by mouth once daily.  What changed:   medication strength  how much to take            CONTINUE taking these medications      ferrous sulfate 325 mg (65 mg iron) Tab tablet  Commonly known as: FEOSOL  Take 1 tablet (325 mg total) by mouth daily with breakfast.     glucosamine HCl-msm-chondroitn 750-375-400 mg Tab     Lactobacillus acidophilus 10 billion cell Cap     losartan 50 MG tablet  Commonly known as: COZAAR  Take 1 tablet (50 mg total) by mouth once daily.     multivitamin with minerals tablet     potassium chloride SA 20 MEQ tablet  Commonly known as: KLOR-CON M20  Take 1 tablet (20 mEq total) by mouth once daily.            STOP taking these medications      hydroCHLOROthiazide 12.5 MG Tab  Commonly known as: HYDRODIURIL     turmeric root extract 500 mg Cap               Where to Get Your Medications        These medications were sent to [facility] [address] [facility] [address]      Hours: Mon-Fri, 8a-5:30p Phone: [phone]   omeprazole 40 MG capsule  sucralfate 1 gram tablet          Indwelling Lines/Drains at time of discharge:   Lines/Drains/Airways       None                   Time spent on the discharge of patient: 89 minutes         Wero Sher MD  Department of Hospital Medicine  [facility] - Telemetry ([facility])

## 2024-05-02 NOTE — PLAN OF CARE
A207/A207 OPAL Marino is a 78 y.o.female admitted on 4/29/2024 for GI bleed   Code Status: Full Code MRN: [id number]   Review of patient's allergies indicates:   Allergen Reactions    Statins-hmg-coa reductase inhibitors Other (See Comments)     Pt reports muscle spasms when taking med     Past Medical History:   Diagnosis Date    Arthritis     Asthma     Hyperlipidemia     Hypertension       PRN meds    0.9%  NaCl infusion (for blood administration), , Q24H PRN  acetaminophen, 500 mg, Q6H PRN  hydrALAZINE, 10 mg, Q6H PRN  ondansetron, 4 mg, Q6H PRN  sodium chloride 0.9%, 10 mL, PRN      AVS Discharge instructions received and reviewed with pt and family at bedside.  Pt voiced understanding and all questions answered to satisfaction.  Stressed importance to making and keeping all follow up appointments.  Medications at bedside and reviewed with pt.  Tele monitor removed and brought to monitor tech.  IV d/c'd with tip intact, pressure dressing applied.  Pt will call when ready to be transported to front  hospital via w/c to be discharged home.      Orientation: oriented x 4  [place] Coma Scale Score: 15     Lead Monitored: Lead II Rhythm: normal sinus rhythm Frequency/Ectopy: Trigeminy  Cardiac/Telemetry Box Number: [id number]  VTE Required Core Measure: (SCDs) Sequential compression device initiated/maintained Last Bowel Movement: 04/29/24  Diet Full Liquid     Martir Score: 21  Fall Risk Score: 12  Accucheck []   Freq?      Lines/Drains/Airways       Peripheral Intravenous Line  Duration                  Peripheral IV - Single Lumen 04/29/24 0910 20 G Left Antecubital 3 days         Peripheral IV - Single Lumen 04/29/24 1120 20 G Right Antecubital 2 days                       Problem: Adult Inpatient Plan of Care  Goal: Plan of Care Review  Outcome: Met  Goal: Patient-Specific Goal (Individualized)  Outcome: Met  Goal: Absence of Hospital-Acquired Illness or Injury  Outcome: Met  Goal: Optimal Comfort and  Wellbeing  Outcome: Met  Goal: Readiness for Transition of Care  Outcome: Met

## 2024-05-02 NOTE — PLAN OF CARE
A207/A207 OPAL Marino is a 78 y.o.female admitted on 4/29/2024 for GI bleed   Code Status: Full Code MRN: [id number]   Review of patient's allergies indicates:   Allergen Reactions    Statins-hmg-coa reductase inhibitors Other (See Comments)     Pt reports muscle spasms when taking med     Past Medical History:   Diagnosis Date    Arthritis     Asthma     Hyperlipidemia     Hypertension       PRN meds    0.9%  NaCl infusion (for blood administration), , Q24H PRN  acetaminophen, 500 mg, Q6H PRN  hydrALAZINE, 10 mg, Q6H PRN  ondansetron, 4 mg, Q6H PRN  sodium chloride 0.9%, 10 mL, PRN      Chart check completed. Will continue plan of care.      Orientation: oriented x 4  [place] Coma Scale Score: 15     Lead Monitored: Lead II Rhythm: normal sinus rhythm Frequency/Ectopy: Trigeminy  Cardiac/Telemetry Box Number: [id number]    Last Bowel Movement: 04/29/24  Diet Full Liquid     Martir Score: 20  Fall Risk Score: 14  Accucheck []   Freq?      Lines/Drains/Airways       Peripheral Intravenous Line  Duration                  Peripheral IV - Single Lumen 04/29/24 0910 20 G Left Antecubital 2 days         Peripheral IV - Single Lumen 04/29/24 1120 20 G Right Antecubital 2 days                       Problem: Adult Inpatient Plan of Care  Goal: Plan of Care Review  Outcome: Progressing  Goal: Optimal Comfort and Wellbeing  Outcome: Progressing

## 2024-05-02 NOTE — PROGRESS NOTES
Discharge education and instructions reviewed with pt. Questions answered. LDA's and tele monitor removed per order. Pt remained free from falls during shift. Discharge medications delivered to bedside per pharmacy. Transport requested. Pt discharged with personal belongings.

## 2024-05-02 NOTE — DISCHARGE INSTRUCTIONS
Recommend follow up with PCP in 1 week for repeat labs including CBC  Recommend compliance with medications including omeprazole/sucralfate

## 2024-05-03 LAB
FINAL PATHOLOGIC DIAGNOSIS: NORMAL
GROSS: NORMAL
Lab: NORMAL
MICROSCOPIC EXAM: NORMAL

## 2024-05-14 ENCOUNTER — OFFICE VISIT (OUTPATIENT)
Dept: FAMILY MEDICINE | Facility: CLINIC | Age: 79
End: 2024-05-14
Payer: MEDICARE

## 2024-05-14 ENCOUNTER — LAB VISIT (OUTPATIENT)
Dept: LAB | Facility: HOSPITAL | Age: 79
End: 2024-05-14
Attending: FAMILY MEDICINE
Payer: MEDICARE

## 2024-05-14 VITALS
DIASTOLIC BLOOD PRESSURE: 80 MMHG | BODY MASS INDEX: 21.99 KG/M2 | HEART RATE: 73 BPM | SYSTOLIC BLOOD PRESSURE: 160 MMHG | OXYGEN SATURATION: 99 % | WEIGHT: 136.25 LBS

## 2024-05-14 DIAGNOSIS — K25.4 GASTROINTESTINAL HEMORRHAGE ASSOCIATED WITH GASTRIC ULCER: ICD-10-CM

## 2024-05-14 DIAGNOSIS — Z09 HOSPITAL DISCHARGE FOLLOW-UP: Primary | ICD-10-CM

## 2024-05-14 DIAGNOSIS — I63.81 RIGHT THALAMIC STROKE: ICD-10-CM

## 2024-05-14 DIAGNOSIS — Z09 HOSPITAL DISCHARGE FOLLOW-UP: ICD-10-CM

## 2024-05-14 DIAGNOSIS — I10 PRIMARY HYPERTENSION: ICD-10-CM

## 2024-05-14 LAB
ALBUMIN SERPL BCP-MCNC: 3.5 G/DL (ref 3.5–5.2)
ALP SERPL-CCNC: 85 U/L (ref 55–135)
ALT SERPL W/O P-5'-P-CCNC: 11 U/L (ref 10–44)
ANION GAP SERPL CALC-SCNC: 11 MMOL/L (ref 8–16)
AST SERPL-CCNC: 19 U/L (ref 10–40)
BASOPHILS # BLD AUTO: 0.07 K/UL (ref 0–0.2)
BASOPHILS NFR BLD: 1.1 % (ref 0–1.9)
BILIRUB SERPL-MCNC: 0.5 MG/DL (ref 0.1–1)
BUN SERPL-MCNC: 10 MG/DL (ref 8–23)
CALCIUM SERPL-MCNC: 10.6 MG/DL (ref 8.7–10.5)
CHLORIDE SERPL-SCNC: 107 MMOL/L (ref 95–110)
CO2 SERPL-SCNC: 23 MMOL/L (ref 23–29)
CREAT SERPL-MCNC: 0.6 MG/DL (ref 0.5–1.4)
DIFFERENTIAL METHOD BLD: ABNORMAL
EOSINOPHIL # BLD AUTO: 0.2 K/UL (ref 0–0.5)
EOSINOPHIL NFR BLD: 3.4 % (ref 0–8)
ERYTHROCYTE [DISTWIDTH] IN BLOOD BY AUTOMATED COUNT: 14.6 % (ref 11.5–14.5)
EST. GFR  (NO RACE VARIABLE): >60 ML/MIN/1.73 M^2
GLUCOSE SERPL-MCNC: 96 MG/DL (ref 70–110)
HCT VFR BLD AUTO: 34.5 % (ref 37–48.5)
HGB BLD-MCNC: 10.2 G/DL (ref 12–16)
IMM GRANULOCYTES # BLD AUTO: 0.02 K/UL (ref 0–0.04)
IMM GRANULOCYTES NFR BLD AUTO: 0.3 % (ref 0–0.5)
LYMPHOCYTES # BLD AUTO: 1.4 K/UL (ref 1–4.8)
LYMPHOCYTES NFR BLD: 21.4 % (ref 18–48)
MCH RBC QN AUTO: 28.3 PG (ref 27–31)
MCHC RBC AUTO-ENTMCNC: 29.6 G/DL (ref 32–36)
MCV RBC AUTO: 96 FL (ref 82–98)
MONOCYTES # BLD AUTO: 0.7 K/UL (ref 0.3–1)
MONOCYTES NFR BLD: 10.8 % (ref 4–15)
NEUTROPHILS # BLD AUTO: 4.1 K/UL (ref 1.8–7.7)
NEUTROPHILS NFR BLD: 63 % (ref 38–73)
NRBC BLD-RTO: 0 /100 WBC
PLATELET # BLD AUTO: 452 K/UL (ref 150–450)
PMV BLD AUTO: 10.1 FL (ref 9.2–12.9)
POTASSIUM SERPL-SCNC: 4.6 MMOL/L (ref 3.5–5.1)
PROT SERPL-MCNC: 7 G/DL (ref 6–8.4)
RBC # BLD AUTO: 3.61 M/UL (ref 4–5.4)
SODIUM SERPL-SCNC: 141 MMOL/L (ref 136–145)
WBC # BLD AUTO: 6.51 K/UL (ref 3.9–12.7)

## 2024-05-14 PROCEDURE — 80053 COMPREHEN METABOLIC PANEL: CPT | Performed by: FAMILY MEDICINE

## 2024-05-14 PROCEDURE — 3077F SYST BP >= 140 MM HG: CPT | Mod: CPTII,S$GLB,, | Performed by: FAMILY MEDICINE

## 2024-05-14 PROCEDURE — 99214 OFFICE O/P EST MOD 30 MIN: CPT | Mod: S$GLB,,, | Performed by: FAMILY MEDICINE

## 2024-05-14 PROCEDURE — 99999 PR PBB SHADOW E&M-EST. PATIENT-LVL III: CPT | Mod: PBBFAC,,, | Performed by: FAMILY MEDICINE

## 2024-05-14 PROCEDURE — 3079F DIAST BP 80-89 MM HG: CPT | Mod: CPTII,S$GLB,, | Performed by: FAMILY MEDICINE

## 2024-05-14 PROCEDURE — 85025 COMPLETE CBC W/AUTO DIFF WBC: CPT | Performed by: FAMILY MEDICINE

## 2024-05-14 PROCEDURE — 36415 COLL VENOUS BLD VENIPUNCTURE: CPT | Mod: PO | Performed by: FAMILY MEDICINE

## 2024-05-14 NOTE — PROGRESS NOTES
Transitional Care Note  Subjective:       Patient ID: Joyce Marino is a 78 y.o. female.  Chief Complaint: No chief complaint on file.    Family and/or Caretaker present at visit?  No.  Diagnostic tests reviewed/disposition: No diagnosic tests pending after this hospitalization.  Disease/illness education:   Home health/community services discussion/referrals: Patient does not have home health established from hospital visit.  They do not need home health.  If needed, we will set up home health for the patient.   Establishment or re-establishment of referral orders for community resources: No other necessary community resources.   Discussion with other health care providers: No discussion with other health care providers necessary.   Will hospitalized with low H&H due to a gastric bleed possibly due to NSAID use.  Receive transfusion will have a follow-up with GI.  Blood pressure medications were stopped and blood pressure is running high now.    MRI revealed the old thalamic stroke patient denies any symptoms.      History of Present Illness    CHIEF COMPLAINT:  Patient presents today for routine labs check and follow-up.    GASTROINTESTINAL BLEEDING HISTORY:  Patient has a history of GI bleeding, previously presenting low hemoglobin levels. During a recent hospital visit, ulcers were discovered, raising concerns due to attachment of blood vessels to the ulcers. She is currently on medication for these ulcers and denies any recent episodes of dark stools or abdominal pain. She used to alternate Advil and Tylenol for arthritis in the ankle; however, the current regimen is now Tylenol in the morning.    HEAD INJURY AND NEUROLOGICAL HISTORY:  Patient reported a recent fall resulting in hitting her head, caused by her dog. The ensuing CT did not detect acute damage but revealed a previous stroke in the right thalamus, of which she was unaware. A referral to neurology has been recommended for further exam and  treatment planning.    BLOOD PRESSURE MANAGEMENT:  During a hospital stay, patient's medicine was discontinued due to low blood pressure. However, she is now experiencing high blood pressure and reportedly was advised to restart her usual blood pressure medication.    ANEMIA AND FATIGUE:  Patient notes gradual improvement in fatigue that occurred prior to her hospital visit and is suspected to be related to her anemia. She had a previous low hemoglobin count before feeling fatigued.    ROS:  General: denies fever, denies chills, admits fatigue, denies weight gain, denies weight loss, denies loss of appetite  Eyes: denies vision changes, denies blurry vision, denies eye pain, denies eye discharge  ENT: denies ear pain, denies hearing loss, denies tinnitus, denies nasal congestion, denies sore throat  Cardiovascular: denies chest pain, denies palpitations, denies lower extremity edema  Respiratory: denies cough, denies shortness of breath, denies wheezing, denies sputum production  Endocrine: denies polyuria, denies polydipsia, denies heat intolerance, denies cold intolerance  Gastrointestinal: denies abdominal pain, denies heartburn, denies nausea, denies vomiting, denies diarrhea, denies constipation, denies blood in stool, denies melena  Genitourinary: denies dysuria, denies urgency, denies frequency, denies hematuria, denies nocturia, denies incontinence  Heme & Lymphatic: denies easy or excessive bleeding, denies easy bruising, denies swollen lymph nodes  Musculoskeletal: denies muscle pain, denies back pain, denies joint pain, denies joint swelling  Skin: denies rash, denies lesion, denies itching, denies skin texture changes, denies skin color changes  Neurological: denies headache, denies dizziness, denies numbness, denies tingling, denies seizure activity, denies speech difficulty, denies memory loss, denies confusion  Psychiatric: denies anxiety, denies depression, denies sleep difficulty           Review of  Systems    Objective:      Physical Exam  Constitutional:       Appearance: She is well-developed.   Eyes:      Conjunctiva/sclera: Conjunctivae normal.      Pupils: Pupils are equal, round, and reactive to light.   Cardiovascular:      Rate and Rhythm: Normal rate and regular rhythm.      Heart sounds: Normal heart sounds. No murmur heard.  Pulmonary:      Effort: Pulmonary effort is normal. No respiratory distress.      Breath sounds: Normal breath sounds. No wheezing or rales.   Chest:      Chest wall: No tenderness.   Abdominal:      General: There is no distension.      Palpations: Abdomen is soft. There is no mass.      Tenderness: There is no abdominal tenderness. There is no guarding.   Musculoskeletal:         General: No tenderness.      Cervical back: Normal range of motion and neck supple.   Lymphadenopathy:      Cervical: No cervical adenopathy.   Skin:     General: Skin is warm and dry.   Neurological:      Mental Status: She is alert and oriented to person, place, and time.      Deep Tendon Reflexes: Reflexes are normal and symmetric.   Psychiatric:         Behavior: Behavior normal.         Thought Content: Thought content normal.         Judgment: Judgment normal.       Assessment:       1. Hospital discharge follow-up    2. Gastrointestinal hemorrhage associated with gastric ulcer    3. Primary hypertension    4. Right thalamic stroke        Plan:         Assessment & Plan    K25.0 Acute gastric ulcer with hemorrhage  I61.9 Nontraumatic intracerebral hemorrhage, unspecified  D50.9 Iron deficiency anemia, unspecified  I10 Essential (primary) hypertension  BLOOD COUNT ISSUES:  - Monitored the patient's blood count to ensure improvement.  - Ordered a blood count test to further assess the patient's hemoglobin levels.  - Planned for potential colonoscopy or other tests if no improvement in blood count is observed.  - Anticipated reviewing the results of the blood count test during this visit.  -  Informed the patient about the process of blood regeneration, which typically takes about 3 months.  BLOOD PRESSURE MANAGEMENT:  - Advised the patient to resume their blood pressure medication, previously halted during their hospital stay.  - Instructed the patient to monitor their blood pressure at home post resumption of medication.  - Scheduled a follow-up visit for the patient to present their blood pressure numbers and the blood pressure machine.  - Explained the reason for their low blood pressure during their hospital stay and the importance of resuming their blood pressure medication now that their blood volume is back to normal.  NEUROLOGICAL CONCERNS:  - Referred the patient to a neurologist for further evaluation of a previously undetected stroke.  - Provided options for the patient to see a neurologist at [facility], or [facility], depending on availability and wait times.  GASTRIC ULCER:  - Educated the patient about the potential causes of their gastric ulcer, likely due to the anti-inflammatory medication they were taking.  - Suggested the patient to also continue taking turmeric.

## 2024-05-16 ENCOUNTER — TELEPHONE (OUTPATIENT)
Dept: FAMILY MEDICINE | Facility: CLINIC | Age: 79
End: 2024-05-16
Payer: MEDICARE

## 2024-05-16 NOTE — TELEPHONE ENCOUNTER
----- Message from Rona Lee sent at 5/16/2024 12:33 PM CDT -----  Contact: [phone]  Patient called in requesting a call back after missing a call from your office , please call back  [phone]

## 2024-05-21 ENCOUNTER — OFFICE VISIT (OUTPATIENT)
Dept: FAMILY MEDICINE | Facility: CLINIC | Age: 79
End: 2024-05-21
Payer: MEDICARE

## 2024-05-21 ENCOUNTER — PATIENT MESSAGE (OUTPATIENT)
Dept: FAMILY MEDICINE | Facility: CLINIC | Age: 79
End: 2024-05-21

## 2024-05-21 VITALS
WEIGHT: 132.25 LBS | OXYGEN SATURATION: 97 % | BODY MASS INDEX: 21.35 KG/M2 | DIASTOLIC BLOOD PRESSURE: 70 MMHG | SYSTOLIC BLOOD PRESSURE: 116 MMHG | HEART RATE: 78 BPM

## 2024-05-21 DIAGNOSIS — I10 PRIMARY HYPERTENSION: Primary | ICD-10-CM

## 2024-05-21 PROCEDURE — 1160F RVW MEDS BY RX/DR IN RCRD: CPT | Mod: CPTII,S$GLB,, | Performed by: FAMILY MEDICINE

## 2024-05-21 PROCEDURE — 1111F DSCHRG MED/CURRENT MED MERGE: CPT | Mod: CPTII,S$GLB,, | Performed by: FAMILY MEDICINE

## 2024-05-21 PROCEDURE — 3078F DIAST BP <80 MM HG: CPT | Mod: CPTII,S$GLB,, | Performed by: FAMILY MEDICINE

## 2024-05-21 PROCEDURE — 1159F MED LIST DOCD IN RCRD: CPT | Mod: CPTII,S$GLB,, | Performed by: FAMILY MEDICINE

## 2024-05-21 PROCEDURE — 3074F SYST BP LT 130 MM HG: CPT | Mod: CPTII,S$GLB,, | Performed by: FAMILY MEDICINE

## 2024-05-21 PROCEDURE — 99213 OFFICE O/P EST LOW 20 MIN: CPT | Mod: S$GLB,,, | Performed by: FAMILY MEDICINE

## 2024-05-21 PROCEDURE — 99999 PR PBB SHADOW E&M-EST. PATIENT-LVL III: CPT | Mod: PBBFAC,,, | Performed by: FAMILY MEDICINE

## 2024-05-21 NOTE — PROGRESS NOTES
.aqnew  Chief Complaint:    No chief complaint on file.      History of Present Illness:  Presents today for one week follow up,    Recent hospital visit due to GI bleed most likely due to NSAID use.     Blood pressure today borderline elevated.   Her readings at home have been overall stable since restarting medication. Losartan 50 mg and Hydrochlorothiazide but unsure of dosage.     Due for RSV, Shingles, Tetanus vaccinations.       ROS:  Review of Systems   Constitutional:  Negative for appetite change, chills and fever.   HENT:  Negative for congestion, ear pain, postnasal drip, rhinorrhea, sinus pressure and sinus pain.    Eyes:  Negative for pain.   Respiratory:  Negative for cough, chest tightness and shortness of breath.    Cardiovascular:  Negative for chest pain and palpitations.   Gastrointestinal:  Negative for abdominal pain, blood in stool, constipation, diarrhea and nausea.   Genitourinary:  Negative for difficulty urinating, dysuria, flank pain and hematuria.   Musculoskeletal:  Negative for arthralgias, back pain and myalgias.   Skin:  Negative for pallor and wound.   Neurological:  Negative for dizziness, tremors, speech difficulty, light-headedness and headaches.   Psychiatric/Behavioral:  Negative for behavioral problems, dysphoric mood and sleep disturbance.    All other systems reviewed and are negative.      Past Medical History:   Diagnosis Date    Arthritis     Asthma     Hyperlipidemia     Hypertension        Social History:  Social History     Socioeconomic History    Marital status: Single   Tobacco Use    Smoking status: Never    Smokeless tobacco: Never   Substance and Sexual Activity    Alcohol use: No    Drug use: No    Sexual activity: Never     Partners: Male     Birth control/protection: None     Social Determinants of Health     Food Insecurity: No Food Insecurity (5/1/2024)    Hunger Vital Sign     Worried About Running Out of Food in the Last Year: Never true     Ran Out of Food  in the Last Year: Never true   Transportation Needs: No Transportation Needs (5/1/2024)    PRAPARE - Transportation     Lack of Transportation (Medical): No     Lack of Transportation (Non-Medical): No   Housing Stability: Low Risk  (5/1/2024)    Housing Stability Vital Sign     Unable to Pay for Housing in the Last Year: No     Homeless in the Last Year: No       Family History:   family history includes Emphysema in her mother; Heart disease in her father and mother; Lung cancer in her maternal grandfather.    Health Maintenance   Topic Date Due    Shingles Vaccine (2 of 3) 05/08/2012    DEXA Scan  10/06/2019    TETANUS VACCINE  04/04/2024    Lipid Panel  11/13/2024    Mammogram  01/11/2025    Hepatitis C Screening  Completed       Physical Exam:    Vital Signs  Pulse: 78  SpO2: 97 %  BP: (!) 142/90  BP Location: Left arm  Patient Position: Sitting  Height and Weight  Weight: 60 kg (132 lb 4.4 oz)]    Body mass index is 21.35 kg/m².    Physical Exam  Vitals and nursing note reviewed.   Constitutional:       Appearance: Normal appearance. She is not toxic-appearing.   HENT:      Head: Normocephalic and atraumatic.      Right Ear: Tympanic membrane normal.      Left Ear: Tympanic membrane normal.   Eyes:      Extraocular Movements: Extraocular movements intact.      Pupils: Pupils are equal, round, and reactive to light.   Cardiovascular:      Rate and Rhythm: Normal rate and regular rhythm.      Heart sounds: Normal heart sounds.   Pulmonary:      Effort: Pulmonary effort is normal.      Breath sounds: Normal breath sounds. No wheezing, rhonchi or rales.   Abdominal:      General: Bowel sounds are normal. There is no distension.      Palpations: Abdomen is soft.      Tenderness: There is no abdominal tenderness.   Musculoskeletal:         General: Normal range of motion.      Cervical back: Normal range of motion.      Lumbar back: No tenderness.   Skin:     General: Skin is warm and dry.      Capillary Refill:  Capillary refill takes less than 2 seconds.   Neurological:      General: No focal deficit present.      Mental Status: She is alert and oriented to person, place, and time.   Psychiatric:         Mood and Affect: Mood normal.         Behavior: Behavior normal.         Judgment: Judgment normal.           Assessment:      ICD-10-CM ICD-9-CM   1. Primary hypertension  I10 401.9           Plan:  Please send message through [employer] of current Hydrochlorothiazide dosage.  Monitor blood pressure 2 times per day. Check two hours after taking medication. Keep below 139/89.     Get RSV, Shingles, tetanus vaccinations at your pharmacy.     CBC check in 1 month to ensure anemia is improving  No orders of the defined types were placed in this encounter.      Current Outpatient Medications   Medication Sig Dispense Refill    ferrous sulfate 325 mg (65 mg iron) Tab tablet Take 1 tablet (325 mg total) by mouth daily with breakfast. 60 tablet 0    glucosamine HCl-msm-chondroitn 750-375-400 mg Tab Take 1 tablet by mouth 2 (two) times daily.      Lactobacillus acidophilus 10 billion cell Cap Take 1 capsule by mouth once daily.      losartan (COZAAR) 50 MG tablet Take 1 tablet (50 mg total) by mouth once daily. 90 tablet 3    multivitamin with minerals tablet Take 1 tablet by mouth once daily.      omeprazole (PRILOSEC) 40 MG capsule Take 1 capsule (40 mg total) by mouth once daily. 90 capsule 0    potassium chloride SA (KLOR-CON M20) 20 MEQ tablet Take 1 tablet (20 mEq total) by mouth once daily. 90 tablet 3     Current Facility-Administered Medications   Medication Dose Route Frequency Provider Last Rate Last Admin    denosumab (PROLIA) injection 60 mg  60 mg Subcutaneous Q6 Months Yoon Pennington MD   60 mg at 11/04/19 0939       There are no discontinued medications.        No follow-ups on file.      Porter Vasques MD    Scribe Attestation:   Tuan SHORT, am scribing for, and in the presence of, Dr.Arif Vasques I performed  the above scribed service and the documentation accurately describes the services I performed. I attest to the accuracy of the note.    I, Dr. Porter Vasques, reviewed documentation as scribed above. I performed the services described in this documentation.  I agree that the record reflects my personal performance and is accurate and complete. Porter Vasques MD.  05/21/2024

## 2024-05-31 ENCOUNTER — PATIENT MESSAGE (OUTPATIENT)
Dept: GASTROENTEROLOGY | Facility: CLINIC | Age: 79
End: 2024-05-31
Payer: MEDICARE

## 2024-07-24 ENCOUNTER — PATIENT MESSAGE (OUTPATIENT)
Dept: FAMILY MEDICINE | Facility: CLINIC | Age: 79
End: 2024-07-24
Payer: MEDICARE

## 2024-07-24 ENCOUNTER — TELEPHONE (OUTPATIENT)
Dept: FAMILY MEDICINE | Facility: CLINIC | Age: 79
End: 2024-07-24
Payer: MEDICARE

## 2024-07-24 DIAGNOSIS — E61.1 LOW IRON: Primary | ICD-10-CM

## 2024-07-24 NOTE — TELEPHONE ENCOUNTER
She is requesting orders to get blood work done to check iron levels last checked in November 2023

## 2024-07-25 ENCOUNTER — LAB VISIT (OUTPATIENT)
Dept: LAB | Facility: HOSPITAL | Age: 79
End: 2024-07-25
Attending: FAMILY MEDICINE
Payer: MEDICARE

## 2024-07-25 DIAGNOSIS — E61.1 LOW IRON: ICD-10-CM

## 2024-07-25 LAB
BASOPHILS # BLD AUTO: 0.05 K/UL (ref 0–0.2)
BASOPHILS NFR BLD: 0.9 % (ref 0–1.9)
DIFFERENTIAL METHOD BLD: NORMAL
EOSINOPHIL # BLD AUTO: 0.1 K/UL (ref 0–0.5)
EOSINOPHIL NFR BLD: 2.6 % (ref 0–8)
ERYTHROCYTE [DISTWIDTH] IN BLOOD BY AUTOMATED COUNT: 13.4 % (ref 11.5–14.5)
HCT VFR BLD AUTO: 38.6 % (ref 37–48.5)
HGB BLD-MCNC: 12.5 G/DL (ref 12–16)
IMM GRANULOCYTES # BLD AUTO: 0.01 K/UL (ref 0–0.04)
IMM GRANULOCYTES NFR BLD AUTO: 0.2 % (ref 0–0.5)
IRON SERPL-MCNC: 69 UG/DL (ref 30–160)
LYMPHOCYTES # BLD AUTO: 1.3 K/UL (ref 1–4.8)
LYMPHOCYTES NFR BLD: 23.6 % (ref 18–48)
MCH RBC QN AUTO: 27.6 PG (ref 27–31)
MCHC RBC AUTO-ENTMCNC: 32.4 G/DL (ref 32–36)
MCV RBC AUTO: 85 FL (ref 82–98)
MONOCYTES # BLD AUTO: 0.6 K/UL (ref 0.3–1)
MONOCYTES NFR BLD: 10.3 % (ref 4–15)
NEUTROPHILS # BLD AUTO: 3.3 K/UL (ref 1.8–7.7)
NEUTROPHILS NFR BLD: 62.4 % (ref 38–73)
NRBC BLD-RTO: 0 /100 WBC
PLATELET # BLD AUTO: 256 K/UL (ref 150–450)
PMV BLD AUTO: 10.2 FL (ref 9.2–12.9)
RBC # BLD AUTO: 4.53 M/UL (ref 4–5.4)
SATURATED IRON: 17 % (ref 20–50)
TOTAL IRON BINDING CAPACITY: 410 UG/DL (ref 250–450)
TRANSFERRIN SERPL-MCNC: 277 MG/DL (ref 200–375)
WBC # BLD AUTO: 5.35 K/UL (ref 3.9–12.7)

## 2024-07-25 PROCEDURE — 36415 COLL VENOUS BLD VENIPUNCTURE: CPT | Mod: PO | Performed by: FAMILY MEDICINE

## 2024-07-25 PROCEDURE — 85025 COMPLETE CBC W/AUTO DIFF WBC: CPT | Performed by: FAMILY MEDICINE

## 2024-07-25 PROCEDURE — 84466 ASSAY OF TRANSFERRIN: CPT | Performed by: FAMILY MEDICINE

## 2024-07-26 ENCOUNTER — PATIENT MESSAGE (OUTPATIENT)
Dept: FAMILY MEDICINE | Facility: CLINIC | Age: 79
End: 2024-07-26
Payer: MEDICARE

## 2024-08-01 ENCOUNTER — HOSPITAL ENCOUNTER (OUTPATIENT)
Facility: HOSPITAL | Age: 79
Discharge: HOME | End: 2024-08-01
Attending: INTERNAL MEDICINE | Admitting: INTERNAL MEDICINE
Payer: MEDICARE

## 2024-08-01 ENCOUNTER — ANESTHESIA (OUTPATIENT)
Dept: ENDOSCOPY | Facility: HOSPITAL | Age: 79
End: 2024-08-01
Payer: MEDICARE

## 2024-08-01 ENCOUNTER — ANESTHESIA EVENT (OUTPATIENT)
Dept: ENDOSCOPY | Facility: HOSPITAL | Age: 79
End: 2024-08-01
Payer: MEDICARE

## 2024-08-01 DIAGNOSIS — K27.9 PUD (PEPTIC ULCER DISEASE): ICD-10-CM

## 2024-08-01 PROCEDURE — 43235 EGD DIAGNOSTIC BRUSH WASH: CPT | Performed by: INTERNAL MEDICINE

## 2024-08-01 PROCEDURE — 37000008 HC ANESTHESIA 1ST 15 MINUTES: Performed by: INTERNAL MEDICINE

## 2024-08-01 PROCEDURE — 25000003 PHARM REV CODE 250: Performed by: NURSE ANESTHETIST, CERTIFIED REGISTERED

## 2024-08-01 PROCEDURE — 63600175 PHARM REV CODE 636 W HCPCS: Performed by: NURSE ANESTHETIST, CERTIFIED REGISTERED

## 2024-08-01 PROCEDURE — 43235 EGD DIAGNOSTIC BRUSH WASH: CPT | Mod: ,,, | Performed by: INTERNAL MEDICINE

## 2024-08-01 RX ORDER — PROPOFOL 10 MG/ML
VIAL (ML) INTRAVENOUS
Status: DISCONTINUED | OUTPATIENT
Start: 2024-08-01 | End: 2024-08-01

## 2024-08-01 RX ORDER — IBUPROFEN 100 MG/5ML
1000 SUSPENSION, ORAL (FINAL DOSE FORM) ORAL DAILY
COMMUNITY

## 2024-08-01 RX ORDER — LIDOCAINE HYDROCHLORIDE 10 MG/ML
INJECTION, SOLUTION EPIDURAL; INFILTRATION; INTRACAUDAL; PERINEURAL
Status: DISCONTINUED | OUTPATIENT
Start: 2024-08-01 | End: 2024-08-01

## 2024-08-01 RX ORDER — SODIUM CHLORIDE, SODIUM LACTATE, POTASSIUM CHLORIDE, CALCIUM CHLORIDE 600; 310; 30; 20 MG/100ML; MG/100ML; MG/100ML; MG/100ML
INJECTION, SOLUTION INTRAVENOUS CONTINUOUS PRN
Status: DISCONTINUED | OUTPATIENT
Start: 2024-08-01 | End: 2024-08-01

## 2024-08-01 RX ORDER — SODIUM CHLORIDE, SODIUM LACTATE, POTASSIUM CHLORIDE, CALCIUM CHLORIDE 600; 310; 30; 20 MG/100ML; MG/100ML; MG/100ML; MG/100ML
INJECTION, SOLUTION INTRAVENOUS CONTINUOUS
Status: DISCONTINUED | OUTPATIENT
Start: 2024-08-01 | End: 2024-08-01 | Stop reason: HOSPADM

## 2024-08-01 RX ADMIN — PROPOFOL 30 MG: 10 INJECTION, EMULSION INTRAVENOUS at 10:08

## 2024-08-01 RX ADMIN — LIDOCAINE HYDROCHLORIDE 50 MG: 10 SOLUTION INTRAVENOUS at 09:08

## 2024-08-01 RX ADMIN — SODIUM CHLORIDE, SODIUM LACTATE, POTASSIUM CHLORIDE, AND CALCIUM CHLORIDE: 600; 310; 30; 20 INJECTION, SOLUTION INTRAVENOUS at 09:08

## 2024-08-01 RX ADMIN — PROPOFOL 70 MG: 10 INJECTION, EMULSION INTRAVENOUS at 09:08

## 2024-08-01 NOTE — TRANSFER OF CARE
"Anesthesia Transfer of Care Note    Patient: Joyce Marino    Procedure(s) Performed: Procedure(s) (LRB):  EGD (ESOPHAGOGASTRODUODENOSCOPY) (N/A)    Patient location: GI    Anesthesia Type: MAC    Transport from OR: Transported from OR on room air with adequate spontaneous ventilation    Post pain: adequate analgesia    Post assessment: no apparent anesthetic complications and tolerated procedure well    Post vital signs: stable    Level of consciousness: responds to stimulation    Nausea/Vomiting: no nausea/vomiting    Complications: none    Transfer of care protocol was followed      Last vitals: Visit Vitals  BP (!) 158/70 (BP Location: Left arm, Patient Position: Lying)   Pulse 65   Temp 37.4 °C (99.3 °F) (Temporal)   Resp 17   Ht 5' 6" (1.676 m)   Wt 58.1 kg (128 lb)   SpO2 99%   Breastfeeding No   BMI 20.66 kg/m²     "

## 2024-08-01 NOTE — ANESTHESIA POSTPROCEDURE EVALUATION
Anesthesia Post Evaluation    Patient: Joyce Marino    Procedure(s) Performed: Procedure(s) (LRB):  EGD (ESOPHAGOGASTRODUODENOSCOPY) (N/A)    Final Anesthesia Type: MAC      Patient location during evaluation: GI PACU  Patient participation: Yes- Able to Participate  Level of consciousness: awake and alert and oriented  Post-procedure vital signs: reviewed and stable  Pain management: adequate  Airway patency: patent  ANOOP mitigation strategies: Multimodal analgesia  PONV status at discharge: No PONV  Anesthetic complications: no      Cardiovascular status: hemodynamically stable  Respiratory status: unassisted, spontaneous ventilation and room air  Hydration status: euvolemic  Follow-up not needed.              Vitals Value Taken Time   /63 08/01/24 1017   Temp 36.8 °C (98.2 °F) 08/01/24 1007   Pulse 60 08/01/24 1017   Resp 19 08/01/24 1017   SpO2 97 % 08/01/24 1017         Event Time   Out of Recovery 10:26:49         Pain/Cass Score: Cass Score: 10 (8/1/2024 10:17 AM)

## 2024-08-01 NOTE — ANESTHESIA PREPROCEDURE EVALUATION
08/01/2024  Joyce Marino is a 79 y.o., female.    Patient Active Problem List   Diagnosis    Osteoarthrosis    Hyperlipidemia    Osteoporosis, post-menopausal    HTN (hypertension)    Statin intolerance    Nonrheumatic aortic valve stenosis    Atypical chest pain    Nonrheumatic aortic valve insufficiency    Myalgia due to HMG CoA reductase inhibitor    Acute blood loss anemia    GI bleed    Subacute intracranial hemorrhage    Normocytic anemia     Past Medical History:   Diagnosis Date    Arthritis     Asthma     Hyperlipidemia     Hypertension      Past Surgical History:   Procedure Laterality Date    COLONOSCOPY N/A 07/21/2022    Procedure: COLONOSCOPY;  Surgeon: Conrado Pierre MD;  Location: [facility];  Service: Endoscopy;  Laterality: N/A;    ESOPHAGOGASTRODUODENOSCOPY N/A 07/21/2022    Procedure: EGD (ESOPHAGOGASTRODUODENOSCOPY);  Surgeon: Conrado Pierre MD;  Location: [facility];  Service: Endoscopy;  Laterality: N/A;    ESOPHAGOGASTRODUODENOSCOPY N/A 4/30/2024    Procedure: EGD (ESOPHAGOGASTRODUODENOSCOPY);  Surgeon: Christal Hernández MD;  Location: [facility];  Service: Gastroenterology;  Laterality: N/A;    HYSTERECTOMY  1992    for fibroids    OOPHORECTOMY      BSO at time of hysterectomy    michael in right leg           Pre-op Assessment    I have reviewed the Patient Summary Reports.     I have reviewed the Nursing Notes. I have reviewed the NPO Status.   I have reviewed the Medications.     Review of Systems  Anesthesia Hx:  No problems with previous Anesthesia               Denies Personal Hx of Anesthesia complications.                    Social:  No Alcohol Use, Non-Smoker       Hematology/Oncology:  Hematology Normal   Oncology Normal                                   EENT/Dental:  EENT/Dental Normal           Cardiovascular:     Hypertension Valvular problems/Murmurs, AS           hyperlipidemia                             Pulmonary:    Asthma                    Renal/:  Renal/ Normal                 Hepatic/GI:  Hepatic/GI Normal                 Musculoskeletal:  Arthritis               Neurological:   CVA                                    Endocrine:  Endocrine Normal            Dermatological:  Skin Normal    Psych:  Psychiatric Normal                  Results for orders placed or performed during the hospital encounter of 04/29/24   EKG 12-lead    Collection Time: 04/29/24  8:22 AM   Result Value Ref Range    QRS Duration 98 ms    [facility] QTC Calculation 440 ms    Narrative    Test Reason : R55,    Vent. Rate : 086 BPM     Atrial Rate : 086 BPM     P-R Int : 182 ms          QRS Dur : 098 ms      QT Int : 368 ms       P-R-T Axes : 078 018 062 degrees     QTc Int : 440 ms    Sinus rhythm with frequent Premature ventricular complexes  Possible Lateral infarct ,age undetermined  Abnormal ECG  When compared with ECG of 22-JUN-2022 13:42,  Premature ventricular complexes are now Present  Borderline criteria for Lateral infarct are now Present  Confirmed by ALVINA CORBIN MD ([id number]) on 4/29/2024 8:08:47 PM    Referred By: [email or web address]   SELF           Confirmed By:ALVINA CORBIN MD     Results for orders placed during the hospital encounter of 07/01/22    Echo    Interpretation Summary  · The left ventricle is normal in size with mild concentric hypertrophy and normal systolic function.  · Moderate left atrial enlargement.  · The estimated ejection fraction is 60%.  · Normal left ventricular diastolic function.  · Normal right ventricular size with normal right ventricular systolic function.  · Mild mitral regurgitation.  · There is moderate aortic valve stenosis.  · Aortic valve area is 1.12 cm2; peak velocity is 2.82 m/s; mean gradient is 19 mmHg.  · Mild tricuspid regurgitation.  · Normal central venous pressure (3 mmHg).  · The estimated PA systolic pressure is 31 mmHg.      Physical  Exam  General: Well nourished, Cooperative, Alert and Oriented    Airway:  Mallampati: II   Mouth Opening: Normal  TM Distance: Normal  Tongue: Normal  Neck ROM: Normal ROM    Dental:  Intact    Chest/Lungs:  Normal Respiratory Rate    Heart:  Rate: Normal  Rhythm: Regular Rhythm        Anesthesia Plan  Type of Anesthesia, risks & benefits discussed:    Anesthesia Type: MAC  Intra-op Monitoring Plan: Standard ASA Monitors  Post Op Pain Control Plan: IV/PO Opioids PRN  Informed Consent: Informed consent signed with the Patient and all parties understand the risks and agree with anesthesia plan.  All questions answered.   ASA Score: 3  Day of Surgery Review of History & Physical: H&P Update referred to the surgeon/provider.    Ready For Surgery From Anesthesia Perspective.     .

## 2024-08-02 VITALS
BODY MASS INDEX: 20.57 KG/M2 | HEART RATE: 60 BPM | DIASTOLIC BLOOD PRESSURE: 63 MMHG | RESPIRATION RATE: 19 BRPM | HEIGHT: 66 IN | WEIGHT: 128 LBS | TEMPERATURE: 98 F | OXYGEN SATURATION: 97 % | SYSTOLIC BLOOD PRESSURE: 120 MMHG

## 2024-08-05 PROBLEM — K92.2 GI BLEED: Status: RESOLVED | Noted: 2024-04-29 | Resolved: 2024-08-05

## 2024-10-28 DIAGNOSIS — I10 HYPERTENSION, UNSPECIFIED TYPE: ICD-10-CM

## 2024-10-28 RX ORDER — LOSARTAN POTASSIUM 50 MG/1
50 TABLET ORAL
Qty: 90 TABLET | Refills: 1 | Status: SHIPPED | OUTPATIENT
Start: 2024-10-28

## 2024-10-28 RX ORDER — POTASSIUM CHLORIDE 1500 MG/1
20 TABLET, EXTENDED RELEASE ORAL
Qty: 90 TABLET | Refills: 1 | Status: SHIPPED | OUTPATIENT
Start: 2024-10-28

## 2025-02-05 DIAGNOSIS — Z78.0 MENOPAUSE: ICD-10-CM

## 2025-04-24 DIAGNOSIS — I10 HYPERTENSION, UNSPECIFIED TYPE: ICD-10-CM

## 2025-04-24 RX ORDER — POTASSIUM CHLORIDE 1500 MG/1
20 TABLET, EXTENDED RELEASE ORAL
Qty: 90 TABLET | Refills: 0 | Status: SHIPPED | OUTPATIENT
Start: 2025-04-24

## 2025-04-24 RX ORDER — LOSARTAN POTASSIUM 50 MG/1
50 TABLET ORAL
Qty: 90 TABLET | Refills: 0 | Status: SHIPPED | OUTPATIENT
Start: 2025-04-24

## 2025-04-24 NOTE — TELEPHONE ENCOUNTER
Care Due:                  Date            Visit Type   Department     Provider  --------------------------------------------------------------------------------                                EP -                              PRIMARY      [facility]  Last Visit: 05-      CARE ([facility])   MEDICINE       Porter Vasques  Next Visit: None Scheduled  None         None Found                                                            Last  Test          Frequency    Reason                     Performed    Due Date  --------------------------------------------------------------------------------    CMP.........  12 months..  KLOR-CON, losartan.......  05-   05-    [facility] Embedded Care Due Messages. Reference number: [id number].   4/24/2025 12:39:10 AM CDT

## 2025-04-24 NOTE — TELEPHONE ENCOUNTER
Provider Staff:  Action required for this patient    Requires labs      Please see care gap opportunities below in Care Due Message.    Thanks!  [facility]     Appointments      Date Provider   Last Visit   5/21/2024 Porter Vasques MD   Next Visit   Visit date not found Porter Vasques MD     Refill Decision Note   Joyce Marino  is requesting a refill authorization.  Brief Assessment and Rationale for Refill:  Approve     Medication Therapy Plan:         Comments:     Note composed:7:49 AM 04/24/2025

## 2025-07-22 DIAGNOSIS — I10 HYPERTENSION, UNSPECIFIED TYPE: ICD-10-CM

## 2025-07-23 RX ORDER — LOSARTAN POTASSIUM 50 MG/1
50 TABLET ORAL
Qty: 90 TABLET | Refills: 0 | Status: SHIPPED | OUTPATIENT
Start: 2025-07-23

## 2025-07-23 RX ORDER — POTASSIUM CHLORIDE 1500 MG/1
20 TABLET, EXTENDED RELEASE ORAL
Qty: 90 TABLET | Refills: 0 | Status: SHIPPED | OUTPATIENT
Start: 2025-07-23

## 2025-07-23 NOTE — TELEPHONE ENCOUNTER
No care due was identified.  [facility] Embedded Care Due Messages. Reference number: [id number].   7/22/2025 11:14:42 PM CDT

## 2025-07-23 NOTE — TELEPHONE ENCOUNTER
Refill Routing Note   Medication(s) are not appropriate for processing by [facility] for the following reason(s):        Required labs outdated    [facility] action(s):  Defer             Appointments  past 12m or future 3m with PCP    Date Provider   Last Visit   5/21/2024 Porter Vasques MD   Next Visit   Visit date not found Porter Vasques MD   ED visits in past 90 days: 0        Note composed:11:43 PM 07/22/2025                03-Jul-2025 10:19

## 2025-08-29 ENCOUNTER — PATIENT MESSAGE (OUTPATIENT)
Dept: ADMINISTRATIVE | Facility: HOSPITAL | Age: 80
End: 2025-08-29
Payer: MEDICARE